# Patient Record
Sex: FEMALE | Race: WHITE | NOT HISPANIC OR LATINO | Employment: UNEMPLOYED | URBAN - NONMETROPOLITAN AREA
[De-identification: names, ages, dates, MRNs, and addresses within clinical notes are randomized per-mention and may not be internally consistent; named-entity substitution may affect disease eponyms.]

---

## 2017-02-01 ENCOUNTER — HOSPITAL ENCOUNTER (OUTPATIENT)
Dept: GENERAL RADIOLOGY | Facility: HOSPITAL | Age: 52
Discharge: HOME | End: 2017-02-01
Attending: FAMILY MEDICINE | Admitting: FAMILY MEDICINE

## 2017-02-01 ENCOUNTER — TRANSCRIBE ORDERS (OUTPATIENT)
Dept: ADMINISTRATIVE | Facility: HOSPITAL | Age: 52
End: 2017-02-01

## 2017-02-01 DIAGNOSIS — M54.2 NECK PAIN: Primary | ICD-10-CM

## 2017-02-01 PROCEDURE — 72050 X-RAY EXAM NECK SPINE 4/5VWS: CPT

## 2017-02-20 ENCOUNTER — OFFICE VISIT (OUTPATIENT)
Dept: NEUROSURGERY | Facility: CLINIC | Age: 52
End: 2017-02-20

## 2017-02-20 VITALS
BODY MASS INDEX: 21.49 KG/M2 | WEIGHT: 129 LBS | DIASTOLIC BLOOD PRESSURE: 60 MMHG | HEIGHT: 65 IN | SYSTOLIC BLOOD PRESSURE: 100 MMHG

## 2017-02-20 DIAGNOSIS — M54.2 NECK PAIN: Primary | ICD-10-CM

## 2017-02-20 PROCEDURE — 99203 OFFICE O/P NEW LOW 30 MIN: CPT | Performed by: NEUROLOGICAL SURGERY

## 2017-02-20 RX ORDER — MELOXICAM 15 MG/1
15 TABLET ORAL DAILY
COMMUNITY
End: 2018-01-18

## 2017-02-20 RX ORDER — MEDROXYPROGESTERONE ACETATE 2.5 MG/1
2.5 TABLET ORAL DAILY
COMMUNITY
End: 2017-08-01 | Stop reason: SDUPTHER

## 2017-02-20 RX ORDER — MAGNESIUM OXIDE 400 MG/1
250 TABLET ORAL DAILY
COMMUNITY
End: 2017-08-01

## 2017-02-20 RX ORDER — PHENOL 1.4 %
600 AEROSOL, SPRAY (ML) MUCOUS MEMBRANE DAILY
COMMUNITY
End: 2017-08-01

## 2017-02-20 RX ORDER — ASPIRIN 81 MG/1
81 TABLET ORAL DAILY
COMMUNITY
End: 2019-09-03

## 2017-02-20 RX ORDER — ESTRADIOL 2 MG/1
2 TABLET ORAL DAILY
COMMUNITY
End: 2017-08-01 | Stop reason: SDUPTHER

## 2017-02-20 RX ORDER — VITAMIN B COMPLEX
1 CAPSULE ORAL DAILY
COMMUNITY
End: 2017-08-01
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
## 2017-02-22 ENCOUNTER — APPOINTMENT (OUTPATIENT)
Dept: PHYSICAL THERAPY | Facility: HOSPITAL | Age: 52
End: 2017-02-22

## 2017-02-24 ENCOUNTER — APPOINTMENT (OUTPATIENT)
Dept: PHYSICAL THERAPY | Facility: HOSPITAL | Age: 52
End: 2017-02-24

## 2017-02-28 ENCOUNTER — APPOINTMENT (OUTPATIENT)
Dept: PHYSICAL THERAPY | Facility: HOSPITAL | Age: 52
End: 2017-02-28

## 2017-03-02 ENCOUNTER — APPOINTMENT (OUTPATIENT)
Dept: PHYSICAL THERAPY | Facility: HOSPITAL | Age: 52
End: 2017-03-02

## 2017-03-06 ENCOUNTER — APPOINTMENT (OUTPATIENT)
Dept: PHYSICAL THERAPY | Facility: HOSPITAL | Age: 52
End: 2017-03-06

## 2017-03-08 ENCOUNTER — APPOINTMENT (OUTPATIENT)
Dept: PHYSICAL THERAPY | Facility: HOSPITAL | Age: 52
End: 2017-03-08

## 2017-03-09 ENCOUNTER — OFFICE VISIT (OUTPATIENT)
Dept: NEUROSURGERY | Facility: CLINIC | Age: 52
End: 2017-03-09

## 2017-03-09 ENCOUNTER — HOSPITAL ENCOUNTER (OUTPATIENT)
Dept: MRI IMAGING | Facility: HOSPITAL | Age: 52
Discharge: HOME | End: 2017-03-09
Attending: NEUROLOGICAL SURGERY | Admitting: NEUROLOGICAL SURGERY

## 2017-03-09 VITALS
BODY MASS INDEX: 21.49 KG/M2 | WEIGHT: 129 LBS | HEIGHT: 65 IN | DIASTOLIC BLOOD PRESSURE: 60 MMHG | SYSTOLIC BLOOD PRESSURE: 100 MMHG

## 2017-03-09 DIAGNOSIS — M54.2 NECK PAIN: Primary | ICD-10-CM

## 2017-03-09 DIAGNOSIS — M54.2 NECK PAIN: ICD-10-CM

## 2017-03-09 PROCEDURE — 72141 MRI NECK SPINE W/O DYE: CPT

## 2017-03-09 PROCEDURE — 99213 OFFICE O/P EST LOW 20 MIN: CPT | Performed by: NEUROLOGICAL SURGERY
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
## 2017-03-13 ENCOUNTER — APPOINTMENT (OUTPATIENT)
Dept: PHYSICAL THERAPY | Facility: HOSPITAL | Age: 52
End: 2017-03-13

## 2017-03-15 ENCOUNTER — APPOINTMENT (OUTPATIENT)
Dept: PHYSICAL THERAPY | Facility: HOSPITAL | Age: 52
End: 2017-03-15

## 2017-03-20 ENCOUNTER — APPOINTMENT (OUTPATIENT)
Dept: PHYSICAL THERAPY | Facility: HOSPITAL | Age: 52
End: 2017-03-20

## 2017-03-22 ENCOUNTER — APPOINTMENT (OUTPATIENT)
Dept: PHYSICAL THERAPY | Facility: HOSPITAL | Age: 52
End: 2017-03-22

## 2017-03-27 ENCOUNTER — APPOINTMENT (OUTPATIENT)
Dept: PHYSICAL THERAPY | Facility: HOSPITAL | Age: 52
End: 2017-03-27

## 2017-03-29 ENCOUNTER — APPOINTMENT (OUTPATIENT)
Dept: PHYSICAL THERAPY | Facility: HOSPITAL | Age: 52
End: 2017-03-29

## 2017-05-22 ENCOUNTER — HOSPITAL ENCOUNTER (OUTPATIENT)
Dept: GENERAL RADIOLOGY | Age: 52
Discharge: HOME | End: 2017-05-22
Payer: COMMERCIAL

## 2017-05-22 DIAGNOSIS — M54.2 CERVICALGIA: ICD-10-CM

## 2017-05-22 PROCEDURE — 72040 X-RAY EXAM NECK SPINE 2-3 VW: CPT

## 2017-08-01 ENCOUNTER — OFFICE VISIT (OUTPATIENT)
Dept: OBSTETRICS AND GYNECOLOGY | Facility: CLINIC | Age: 52
End: 2017-08-01

## 2017-08-01 VITALS
SYSTOLIC BLOOD PRESSURE: 114 MMHG | DIASTOLIC BLOOD PRESSURE: 64 MMHG | BODY MASS INDEX: 20.83 KG/M2 | HEIGHT: 65 IN | WEIGHT: 125 LBS

## 2017-08-01 DIAGNOSIS — Z78.0 MENOPAUSE: ICD-10-CM

## 2017-08-01 DIAGNOSIS — Z01.411 ENCOUNTER FOR GYNECOLOGICAL EXAMINATION WITH ABNORMAL FINDING: ICD-10-CM

## 2017-08-01 DIAGNOSIS — Z87.891 FORMER SMOKER: ICD-10-CM

## 2017-08-01 PROCEDURE — 99386 PREV VISIT NEW AGE 40-64: CPT | Performed by: OBSTETRICS & GYNECOLOGY

## 2017-08-01 RX ORDER — ESTRADIOL 2 MG/1
2 TABLET ORAL DAILY
Qty: 30 TABLET | Refills: 11 | Status: SHIPPED | OUTPATIENT
Start: 2017-08-01 | End: 2018-08-20 | Stop reason: SDUPTHER

## 2017-08-01 RX ORDER — MEDROXYPROGESTERONE ACETATE 2.5 MG/1
2.5 TABLET ORAL DAILY
Qty: 30 TABLET | Refills: 11 | Status: SHIPPED | OUTPATIENT
Start: 2017-08-01 | End: 2018-08-20 | Stop reason: SDUPTHER

## 2017-08-01 NOTE — PROGRESS NOTES
Subjective   Chief Complaint   Patient presents with   • Gynecologic Exam     New pt. Due for yearly exam. Wants to establish care. S/p BIB [facility] for uterine fibroids in  by Dr Acosta. Uses provera 2.5mg and estradiol 2mg, testosterone 1% cream. Pt states she sleeps better when she takes her provera.      Jyoti Pleitez is a 52 y.o. year old  menopausal female presenting to be seen for her annual exam.  The patient is status-post hysterectomy. Hot flashes and night sweats are not a significant problem since she is taking HRT.  Overall, the patient reports feeling well.    SEXUAL Hx:  She is sexually active.  In the past year there has not been new sexual partners.      HEALTH Hx:  She exercises regularly: no.    The patient reports regular self breast exams: no  She has noticed changes in height: no.    No Additional Complaints Reported    The following portions of the patient's history were reviewed and updated as appropriate:problem list, current medications, allergies, past family history, past medical history, past social history and past surgical history.    Smoking status: Former Smoker                                                              Packs/day: 0.00      Years: 14.00        Types: Cigarettes  Smokeless status: Never Used                        Review of Systems   Constitutional: Negative for activity change and unexpected weight change.   HENT: Negative for congestion.    Eyes: Positive for visual disturbance (wears glasses, she also occasionally experiences blurryness in peripheral vision).   Respiratory: Negative for shortness of breath.    Cardiovascular: Negative for chest pain (occasionally with panic attack, but that is rare).   Gastrointestinal: Positive for diarrhea (only with anxiety). Negative for abdominal pain, blood in stool, constipation, nausea and vomiting.   Endocrine: Negative for cold intolerance and heat intolerance.   Genitourinary: Positive for difficulty urinating  "(only early in the morning), enuresis (occasionally, associated with urgerncy) and urgency. Negative for dyspareunia, pelvic pain, vaginal bleeding and vaginal discharge.   Musculoskeletal: Positive for back pain, neck pain and neck stiffness. Negative for arthralgias.   Skin: Negative for rash.   Neurological: Negative for dizziness and headaches.   Psychiatric/Behavioral: Negative for sleep disturbance. The patient is nervous/anxious.          Objective   /64 (BP Location: Left arm, Patient Position: Sitting)  Ht 65\" (165.1 cm)  Wt 125 lb (56.7 kg)  BMI 20.8 kg/m2  Physical Exam   Constitutional: She is oriented to person, place, and time. She appears well-developed and well-nourished. No distress.   HENT:   Head: Normocephalic and atraumatic.   Eyes: EOM are normal.   Neck: Normal range of motion. Neck supple. No thyromegaly present.   Cardiovascular: Normal rate and regular rhythm.    No murmur heard.  Pulmonary/Chest: Effort normal and breath sounds normal. Right breast exhibits no inverted nipple and no mass. Left breast exhibits no inverted nipple and no mass.   S/p bilateral breast augmentation   Abdominal: Soft. She exhibits no distension. There is no tenderness.   Genitourinary: Vagina normal. No breast tenderness or discharge. Pelvic exam was performed with patient supine. There is no tenderness or lesion on the right labia. There is no tenderness or lesion on the left labia. Right adnexum displays no tenderness and no fullness. Left adnexum displays no tenderness and no fullness. No bleeding in the vagina. No vaginal discharge found.   Genitourinary Comments: Pt s/p hysterectomy: uterus and cervix surgically absent.  Vaginal cuff unremarkable.   Musculoskeletal: Normal range of motion. She exhibits no edema.   Neurological: She is alert and oriented to person, place, and time.   Skin: Skin is warm and dry.   Psychiatric: She has a normal mood and affect. Her behavior is normal. Judgment normal. "   Nursing note and vitals reviewed.         Assessment & Plan  Jyoti was seen today for gynecologic exam.    Diagnoses and all orders for this visit:    Body mass index (BMI) 20.0-20.9, adult    Encounter for gynecological examination with abnormal finding: Exam unremarkable.  Labs with PCP.  -     Mammo screening digital tomosynthesis bilateral w CAD  -     DEXA Assess Fracture Vertebral; Future    Former smoker  Comments:  quit in 2001    Menopause  Comments:  HRT refilled.  Script given for compounded testosterone cream.  RTO in 90 days.  Orders:  -     estradiol (ESTRACE) 2 MG tablet; Take 1 tablet by mouth Daily.  -     medroxyPROGESTERone (PROVERA) 2.5 MG tablet; Take 1 tablet by mouth Daily.        This note was electronically signed.    Rossy Georges MD  8/1/2017  9:46 AM

## 2017-08-09 ENCOUNTER — TRANSCRIBE ORDERS (OUTPATIENT)
Dept: ADMINISTRATIVE | Facility: HOSPITAL | Age: 52
End: 2017-08-09

## 2017-08-09 DIAGNOSIS — Z13.820 SCREENING FOR OSTEOPOROSIS: Primary | ICD-10-CM

## 2017-08-09 DIAGNOSIS — Z01.411 ENCOUNTER FOR GYNECOLOGICAL EXAMINATION WITH ABNORMAL FINDING: Primary | ICD-10-CM

## 2017-08-14 ENCOUNTER — APPOINTMENT (OUTPATIENT)
Dept: BONE DENSITY | Facility: HOSPITAL | Age: 52
End: 2017-08-14
Attending: OBSTETRICS & GYNECOLOGY

## 2017-08-14 ENCOUNTER — APPOINTMENT (OUTPATIENT)
Dept: MAMMOGRAPHY | Facility: HOSPITAL | Age: 52
End: 2017-08-14
Attending: OBSTETRICS & GYNECOLOGY

## 2017-08-15 DIAGNOSIS — Z13.820 OSTEOPOROSIS SCREENING: Primary | ICD-10-CM

## 2017-08-22 ENCOUNTER — HOSPITAL ENCOUNTER (OUTPATIENT)
Dept: BONE DENSITY | Facility: HOSPITAL | Age: 52
Discharge: HOME | End: 2017-08-22
Attending: OBSTETRICS & GYNECOLOGY

## 2017-08-22 ENCOUNTER — TELEPHONE (OUTPATIENT)
Dept: OBSTETRICS AND GYNECOLOGY | Facility: CLINIC | Age: 52
End: 2017-08-22

## 2017-08-22 ENCOUNTER — HOSPITAL ENCOUNTER (OUTPATIENT)
Dept: MAMMOGRAPHY | Facility: HOSPITAL | Age: 52
Discharge: HOME | End: 2017-08-22
Attending: OBSTETRICS & GYNECOLOGY | Admitting: OBSTETRICS & GYNECOLOGY

## 2017-08-22 PROCEDURE — G0202 SCR MAMMO BI INCL CAD: HCPCS

## 2017-08-22 PROCEDURE — 77080 DXA BONE DENSITY AXIAL: CPT

## 2017-08-22 PROCEDURE — 77063 BREAST TOMOSYNTHESIS BI: CPT

## 2017-08-22 NOTE — TELEPHONE ENCOUNTER
----- Message from Rossy Georges MD sent at 8/22/2017  1:01 PM CDT -----  Please contact the patient and inform her that her bone density shows osteopenia.  Determine whether or not she would be interested in started medication to prevent progression to osteoporosis, please

## 2017-09-08 ENCOUNTER — OFFICE VISIT (OUTPATIENT)
Dept: OBSTETRICS AND GYNECOLOGY | Facility: CLINIC | Age: 52
End: 2017-09-08

## 2017-09-08 VITALS
BODY MASS INDEX: 20.99 KG/M2 | WEIGHT: 126 LBS | DIASTOLIC BLOOD PRESSURE: 70 MMHG | SYSTOLIC BLOOD PRESSURE: 114 MMHG | HEIGHT: 65 IN

## 2017-09-08 DIAGNOSIS — Z87.891 FORMER SMOKER: ICD-10-CM

## 2017-09-08 DIAGNOSIS — M85.80 OSTEOPENIA: Primary | ICD-10-CM

## 2017-09-08 DIAGNOSIS — Z78.0 MENOPAUSE: ICD-10-CM

## 2017-09-08 PROCEDURE — 99213 OFFICE O/P EST LOW 20 MIN: CPT | Performed by: OBSTETRICS & GYNECOLOGY

## 2017-09-24 NOTE — PROGRESS NOTES
Subjective   Chief Complaint   Patient presents with   • osteopenia     According to pt bone scan pt was to come in to speak with physician regarding this. pt also c/o leg cramping and has been taking a supplement for this. pt says that she can tell a difference. pt voices no other concerns.       Jyoti Pleitez is a 52 y.o. year old .  No LMP recorded. Patient has had a hysterectomy.  She presents to be seen because of recent bone density screening and wants to discuss the results.     The following portions of the patient's history were reviewed and updated as appropriate:current medications and allergies    Smoking status: Former Smoker                                                              Packs/day: 0.00      Years: 14.00        Types: Cigarettes  Smokeless status: Never Used                        Review of Systems   Constitutional: Negative for activity change and unexpected weight change.   HENT: Negative for congestion.    Eyes: Positive for visual disturbance (wears glasses, she also occasionally experiences blurryness in peripheral vision).   Respiratory: Negative for shortness of breath.    Cardiovascular: Negative for chest pain (occasionally with panic attack, but that is rare).   Gastrointestinal: Positive for diarrhea (only with anxiety). Negative for abdominal pain, blood in stool, constipation, nausea and vomiting.   Endocrine: Negative for cold intolerance and heat intolerance.   Genitourinary: Positive for difficulty urinating (only early in the morning), enuresis (occasionally, associated with urgerncy) and urgency. Negative for dyspareunia, pelvic pain, vaginal bleeding and vaginal discharge.   Musculoskeletal: Positive for back pain, neck pain and neck stiffness. Negative for arthralgias.   Skin: Negative for rash.   Neurological: Negative for dizziness and headaches.   Psychiatric/Behavioral: Negative for sleep disturbance. The patient is nervous/anxious.          Objective  "  /70  Ht 65\" (165.1 cm)  Wt 126 lb (57.2 kg)  BMI 20.97 kg/m2    Physical Exam   Constitutional: She is oriented to person, place, and time. She appears well-developed and well-nourished. No distress.   HENT:   Head: Normocephalic and atraumatic.   Eyes: EOM are normal.   Neck: Normal range of motion. No thyromegaly present.   Cardiovascular: Normal rate.    No murmur heard.  Pulmonary/Chest: Effort normal. She has no wheezes.   Abdominal: Soft. She exhibits no distension. There is no tenderness.   Musculoskeletal: Normal range of motion.   Neurological: She is alert and oriented to person, place, and time.   Skin: Skin is warm and dry.   Psychiatric: She has a normal mood and affect. Her behavior is normal. Judgment normal.   Nursing note and vitals reviewed.      Lab Review   No data reviewed    Imaging   DEXA     Assessment & Plan  Jyoti was seen today for osteopenia.    Diagnoses and all orders for this visit:    Osteopenia: based on DEXA 8/2017.  Bone density worse at femoral neck than in vertebrae.  Patient already on HRT for menopause symptoms.  She was previously very good at exercising several days/week.  She prefers to be diligent about CA++ with vit D and adding weight-bearing exercise back into her routine.  Will plan to repeat study in two years and start drug therapy at that time if further bone loss.    Former smoker    Menopause    This note was electronically signed.    Rossy Georges MD  September 24, 2017  6:43 PM    Total time spent today with Jyoti  was 20 minutes (level 3).  Greater than 50% of the time was spent coordinating care, answering her questions and counseling regarding bone health and pathophysiology of her presenting problem along with plans for any diagnositc work-up and treatment.      "

## 2017-10-13 ENCOUNTER — TELEPHONE (OUTPATIENT)
Dept: OBSTETRICS AND GYNECOLOGY | Facility: CLINIC | Age: 52
End: 2017-10-13

## 2017-11-01 ENCOUNTER — OFFICE VISIT (OUTPATIENT)
Dept: OBSTETRICS AND GYNECOLOGY | Facility: CLINIC | Age: 52
End: 2017-11-01

## 2017-11-01 VITALS
SYSTOLIC BLOOD PRESSURE: 98 MMHG | HEIGHT: 65 IN | WEIGHT: 127 LBS | BODY MASS INDEX: 21.16 KG/M2 | DIASTOLIC BLOOD PRESSURE: 68 MMHG

## 2017-11-01 DIAGNOSIS — Z78.0 MENOPAUSE: Primary | ICD-10-CM

## 2017-11-01 PROCEDURE — 99213 OFFICE O/P EST LOW 20 MIN: CPT | Performed by: OBSTETRICS & GYNECOLOGY

## 2017-11-01 NOTE — PROGRESS NOTES
"Subjective   Chief Complaint   Patient presents with   • Med Refill     pt here today for refill on her testosterone cream. pt voices no concerns.      Jyoti Pleitez is a 52 y.o. year old .  No LMP recorded. Patient has had a hysterectomy.  She presents to be seen because of menopause symptoms that are being treated with estrace, provera and compounded testosterone cream.  She can tell a difference in both energy level and sex drive with the testosterone cream, but is hoping that there might be a little higher dose.    The following portions of the patient's history were reviewed and updated as appropriate:current medications and allergies    Smoking status: Former Smoker                                                              Packs/day: 0.00      Years: 14.00        Types: Cigarettes  Smokeless status: Never Used                        Review of Systems   Respiratory: Positive for shortness of breath (only occasionally with anxiety).    Cardiovascular: Negative for chest pain.   Gastrointestinal: Negative for abdominal pain, constipation and diarrhea.   Genitourinary: Negative for dyspareunia, pelvic pain, vaginal bleeding and vaginal discharge.         Objective   BP 98/68  Ht 65\" (165.1 cm)  Wt 127 lb (57.6 kg)  BMI 21.13 kg/m2    Physical Exam   Constitutional: She is oriented to person, place, and time. She appears well-developed and well-nourished. No distress.   HENT:   Head: Normocephalic and atraumatic.   Eyes: EOM are normal.   Neck: Normal range of motion. No thyromegaly present.   Cardiovascular:   No murmur heard.  Pulmonary/Chest: Effort normal. She has no wheezes.   Abdominal: Soft. She exhibits no distension. There is no tenderness.   Musculoskeletal: Normal range of motion.   Neurological: She is alert and oriented to person, place, and time.   Skin: Skin is warm and dry.   Psychiatric: She has a normal mood and affect. Her behavior is normal. Judgment normal.   Nursing note and " vitals reviewed.      Lab Review   No data reviewed    Imaging   No data reviewed     Assessment & Plan    Jyoti was seen today for med refill.    Diagnoses and all orders for this visit:    Menopause: Patient to have testosterone drawn so the dose in her compounded cream can be adjusted.  Will also continue Estrace and Provera.  -     Testosterone  -     Lipid Panel With LDL / HDL Ratio      This note was electronically signed.    Rossy Georges MD  November 1, 2017  9:59 AM    Total time spent today with Jyoti  was 20 minutes (level 3).  Greater than 50% of the time was spent coordinating care, answering her questions and counseling regarding pathophysiology of her presenting problem along with plans for any diagnositc work-up and treatment.

## 2017-11-02 ENCOUNTER — TELEPHONE (OUTPATIENT)
Dept: OBSTETRICS AND GYNECOLOGY | Facility: CLINIC | Age: 52
End: 2017-11-02

## 2017-11-02 LAB
CHOLEST SERPL-MCNC: 196 MG/DL (ref 130–200)
HDLC SERPL-MCNC: 91 MG/DL
LDLC SERPL CALC-MCNC: 84 MG/DL (ref 0–99)
LDLC/HDLC SERPL: 0.92 {RATIO}
TESTOST SERPL-MCNC: 12 NG/DL (ref 3–41)
TRIGL SERPL-MCNC: 107 MG/DL (ref 0–149)
VLDLC SERPL CALC-MCNC: 21.4 MG/DL

## 2017-11-02 NOTE — TELEPHONE ENCOUNTER
"Called patient and discussed testosterone levels. Patient is advised that she can either double the volume that she is applying daily or we can call into the pharmacy and have them double the concentration of Testosterone (patient currently using 1%) Patient would like to try doubling volume of medication first and see how she does with that. Patient states that she has tried the 2% testosterone before and it made her \"crazy\" Told patient to try for roughly 2 weeks and call back to let us know how she's doing. Patient voices understanding.         "

## 2017-11-27 NOTE — TELEPHONE ENCOUNTER
Pt called today to let us know she has been doing double the testosterone and said it works and no side effects. So I called the script into [facility] for double the testosterone.

## 2017-12-21 ENCOUNTER — APPOINTMENT (OUTPATIENT)
Dept: GENERAL RADIOLOGY | Facility: HOSPITAL | Age: 52
End: 2017-12-21

## 2017-12-21 PROCEDURE — 72110 X-RAY EXAM L-2 SPINE 4/>VWS: CPT

## 2018-01-10 ENCOUNTER — TRANSCRIBE ORDERS (OUTPATIENT)
Dept: ADMINISTRATIVE | Facility: HOSPITAL | Age: 53
End: 2018-01-10

## 2018-01-10 DIAGNOSIS — M89.49 OTHER HYPERTROPHIC OSTEOARTHROPATHY, MULTIPLE SITES: ICD-10-CM

## 2018-01-10 DIAGNOSIS — M54.89 OTHER BACK PAIN, UNSPECIFIED CHRONICITY: ICD-10-CM

## 2018-01-10 DIAGNOSIS — M54.5 LOW BACK PAIN, UNSPECIFIED BACK PAIN LATERALITY, UNSPECIFIED CHRONICITY, WITH SCIATICA PRESENCE UNSPECIFIED: ICD-10-CM

## 2018-01-10 DIAGNOSIS — M54.17 RADICULOPATHY, LUMBOSACRAL REGION: Primary | ICD-10-CM

## 2018-01-17 ENCOUNTER — HOSPITAL ENCOUNTER (OUTPATIENT)
Dept: MRI IMAGING | Facility: HOSPITAL | Age: 53
Discharge: HOME | End: 2018-01-17
Admitting: PHYSICIAN ASSISTANT

## 2018-01-17 DIAGNOSIS — M89.49 OTHER HYPERTROPHIC OSTEOARTHROPATHY, MULTIPLE SITES: ICD-10-CM

## 2018-01-17 DIAGNOSIS — M54.5 LOW BACK PAIN, UNSPECIFIED BACK PAIN LATERALITY, UNSPECIFIED CHRONICITY, WITH SCIATICA PRESENCE UNSPECIFIED: ICD-10-CM

## 2018-01-17 DIAGNOSIS — M54.17 RADICULOPATHY, LUMBOSACRAL REGION: ICD-10-CM

## 2018-01-17 DIAGNOSIS — M54.89 OTHER BACK PAIN, UNSPECIFIED CHRONICITY: ICD-10-CM

## 2018-01-17 PROCEDURE — 72148 MRI LUMBAR SPINE W/O DYE: CPT

## 2018-01-24 ENCOUNTER — OFFICE VISIT (OUTPATIENT)
Dept: NEUROSURGERY | Facility: CLINIC | Age: 53
End: 2018-01-24

## 2018-01-24 VITALS
SYSTOLIC BLOOD PRESSURE: 100 MMHG | HEIGHT: 65 IN | DIASTOLIC BLOOD PRESSURE: 60 MMHG | BODY MASS INDEX: 21.66 KG/M2 | WEIGHT: 130 LBS

## 2018-01-24 DIAGNOSIS — Z78.9 CURRENT NON-SMOKER: ICD-10-CM

## 2018-01-24 DIAGNOSIS — M47.816 LUMBAR SPONDYLOSIS: Primary | ICD-10-CM

## 2018-01-24 PROCEDURE — 99213 OFFICE O/P EST LOW 20 MIN: CPT | Performed by: NEUROLOGICAL SURGERY

## 2018-01-24 NOTE — PROGRESS NOTES
"    Chief complaint   Chief Complaint   Patient presents with   • Back Pain        Subjective     HPI:     This patient is a 52-year-old female who has had 2 severe episodes of back pain over the past 1 year.  Her most recent episode started on December 16 and felt initially like a lightening bolt.  It started while showering, and the pain was located in the low back.  Her pain level was a 7-8 out of 10.  Over the past 5 weeks, her pain has improved and is currently 2 out of 10.  She does not endorse leg weakness or numbness.  Physical activity had made her pain worse, but over the past 1 week, her pain has improved.  She has had low back issues over the past 17 years.  She has not had surgery, physical therapy, or pain management.  She currently is being treated with Mobic for treatment of her pain    Review of Systems     A 12 point review of systems is obtained and is negative except for as described in HPI    Past Medical History:   Diagnosis Date   • Back problem      Past Surgical History:   Procedure Laterality Date   • AUGMENTATION MAMMAPLASTY  1997   • BREAST AUGMENTATION     • FOOT SURGERY Bilateral    • HYSTERECTOMY      BIB [facility] for uterine fibroids in 2008 by Dr Acosta   • OOPHORECTOMY     • TONSILLECTOMY       Family History   Problem Relation Age of Onset   • Lung cancer Mother    • Coronary artery disease Father    • Hypertension Father    • Prostate cancer Father    • Coronary artery disease Brother    • Coronary artery disease Maternal Grandfather    • Colon cancer Cousin    • Breast cancer Maternal Aunt      Social History   Substance Use Topics   • Smoking status: Former Smoker     Years: 14.00     Types: Cigarettes   • Smokeless tobacco: Never Used   • Alcohol use 0.6 oz/week     1 Glasses of wine per week       (Not in a hospital admission)  Allergies:  Sulfa antibiotics    Objective      Vital Signs  /60  Ht 165.1 cm (65\")  Wt 59 kg (130 lb)  BMI 21.63 kg/m2    Physical Exam    No acute " distress  Awake alert oriented ×3  HEENT atraumatic, normocephalic  Neck supple  Extremities no clubbing, edema, cyanosis  Neurologic exam  Cranial nerves II through XII grossly intact  No pronator drift  Patient moves all extremities 5 out of 5 strength  Sensation is intact light touch in upper and lower extremities  2+ biceps reflex, 2+ patellar reflexes    Results Review:     Mri Lumbar Spine Without Contrast    Result Date: 1/17/2018  Narrative: MRI LUMBAR SPINE WO CONTRAST- 1/17/2018 11:16 CST  HISTORY: radiculopathy; M54.17-Radiculopathy, lumbosacral region; M54.89-Other dorsalgia; M54.5-Low back pain; M89.49-Other hypertrophic osteoarthropathy, multiple sites  COMPARISON: None  TECHNIQUE: Multiplanar, multisequence MRI of the lumbar spine was performed without the use of contrast.  FINDINGS:  Alignment: There are presumed to be 5 lumbar-type vertebrae with the most inferior being labeled L5. Normal lumbar lordosis is maintained. There is no evidence of listhesis or subluxation.  Marrow signal: No pathologic marrow infiltrate is demonstrated. The vertebral body heights and posterior elements are maintained.  Cord/Canal: The conus medullaris terminates at the level of T12-L1. The visualized spinal cord is normal in signal and morphology.  Soft tissues: The surrounding soft tissues are unremarkable.  Levels:  L1-L2: No disc bulge is present. No significant neuroforaminal or central canal stenosis is seen.  L2-L3: Mild diffuse disc bulging. No significant spinal stenosis. No significant neuroforaminal narrowing.  L3-L4: There is loss of disc height with mild diffuse disc bulging. No significant spinal stenosis. No significant neuroforaminal narrowing.  L4-L5: Disc desiccation with loss of disc height and mild diffuse disc bulging. No significant spinal stenosis. There is mild bilateral facet arthropathy without significant neuroforaminal narrowing.  L5-S1: Disc desiccation with loss of disc height and diffuse  disc bulging. The disc bulging does efface the lateral recess on the right, coming in close proximity to the traversing spinal root. No significant spinal stenosis. Bilateral facet arthropathy (right greater than left) with moderate right-sided neuroforaminal narrowing.      Impression: 1. Overall normal spine alignment. 2. Multilevel disc bulging without significant spinal stenosis. There is effacement of the right lateral recess at L5-S1. Moderate right-sided neuroforaminal narrowing at L5-S1 secondary to facet arthropathy.   This report was finalized on 01/17/2018 12:34 by Dr Brian Georges, .          Assessment/Plan:     52-year-old female with 2 episodes of back pain over the past 1 year, last episode lasted 5 weeks.  Her pain has improved spontaneously over the past week and she currently does not endorse radiculopathy or myelopathy.  We will refer her to physical therapy.  I directly reviewed imaging findings with the patient.  She does have L4, 5 and L5, S1 disc desiccation without foraminal stenosis.  I do not recommend surgery at this time, and I do not anticipate surgery in the future at this time.  We will follow-up with her in the future as needed.  Thank you for this consultation.    I discussed the patients findings and my recommendations with patient    Eric Zabala MD  01/24/18  10:05 AM

## 2018-02-09 ENCOUNTER — HOSPITAL ENCOUNTER (OUTPATIENT)
Dept: PHYSICAL THERAPY | Facility: HOSPITAL | Age: 53
Setting detail: THERAPIES SERIES
Discharge: HOME | End: 2018-02-09
Attending: NEUROLOGICAL SURGERY

## 2018-02-09 DIAGNOSIS — M47.816 LUMBAR SPONDYLOSIS: Primary | ICD-10-CM

## 2018-02-09 PROCEDURE — 97161 PT EVAL LOW COMPLEX 20 MIN: CPT | Performed by: PHYSICAL THERAPIST

## 2018-02-09 PROCEDURE — 97110 THERAPEUTIC EXERCISES: CPT | Performed by: PHYSICAL THERAPIST

## 2018-02-09 NOTE — THERAPY EVALUATION
Outpatient Physical Therapy Ortho Initial Evaluation  [facility]     Patient Name: Jyoti Pleitez  : 1965  MRN: [id number]  Today's Date: 2018      Visit Date: 2018    Patient Active Problem List   Diagnosis   • Neck pain   • Lumbar spondylosis   • Current non-smoker        Past Medical History:   Diagnosis Date   • Back problem         Past Surgical History:   Procedure Laterality Date   • AUGMENTATION MAMMAPLASTY     • BREAST AUGMENTATION     • FOOT SURGERY Bilateral    • HYSTERECTOMY      BIB [facility] for uterine fibroids in  by Dr Acosta   • OOPHORECTOMY     • TONSILLECTOMY         Visit Dx:     ICD-10-CM ICD-9-CM   1. Lumbar spondylosis M47.816 721.3             Patient History       18 0755          History    Chief Complaint Pain  -KR      Type of Pain Back pain  -KR      Date Current Problem(s) Began --   on/off 15 years  -KR      Brief Description of Current Complaint She reports back pain on/off for over 15 years. She reports in the last year it has been worse. She reports flexieril used to help manage when she had flares. She reports two times in the last year that her pain was very bad. She reports unable to complete her normal gym activities. Increased pain with household activities. She takes care of her house and her dad's. She reports increased stiffness as well. She reports being very sore after standing activities and even limited with benind over to tie her shoes. No N/T, she reports only bowel/bladder issue is constipation now. She does have history of complete hysterectomy with abdomen incision. She also reports caring for her mother in law, who she sometimes has to push in a wheelchair or assist with walking.   -KR      Previous treatment for THIS PROBLEM Injections;Massage;Pain Management;Chiropractor;Rehabilitation;Medication  -KR      Patient's Rating of General Health Good  -KR      Hand Dominance right-handed  -KR      Occupation/sports/leisure activities  cleans/keeps up 2 houses  -KR      Patient seeing anyone else for problem(s)? Yes  -KR      How has patient tried to help current problem? medication, heat   -KR      What clinical tests have you had for this problem? X-ray;MRI;Bone Density  -KR      Results of Clinical Tests multilevel disc bulging without significant spinal stenosis. there is effacement of the right lateral recess at L5/S1. Moderate right-sided neuroforaminal narrowing at L5-S1 secondary narrowing at L5-S1 secondary to facet arthropathy.   -KR      Pain     Pain Location Back;Buttocks;Abdomen   B  -KR      Pain at Present 0  -KR      Pain at Best 0  -KR      Pain at Worst 3;4   has been up higher last month  -KR      Pain Frequency Intermittent  -KR      Pain Description Sore  -KR      What Performance Factors Make the Current Problem(s) WORSE? mopping, sweeping, cleaning, bending over to tie shoes  -KR      What Performance Factors Make the Current Problem(s) BETTER? slept in recliner when pain was at it's worst  -KR      Is your sleep disturbed? Yes   now back to sleeping  -KR      Fall Risk Assessment    Any falls in the past year: No  -KR      Services    Prior Rehab/Home Health Experiences Yes  -KR      Where was the prior experience with Rehab/Home Health [facility] and reports a facility in [place]  -KR      Are you currently receiving Home Health services No  -KR      Do you plan to receive Home Health services in the near future No  -KR      Daily Activities    Are you able to read Yes  -KR      Are you able to write Yes  -KR      How does patient learn best? Listening;Reading;Demonstration;Pictures/Video  -KR      Teaching needs identified Home Exercise Program;Management of Condition  -KR      Does patient have problems with the following? Depression  -KR      Pt Participated in POC and Goals Yes  -KR      Safety    Are you being hurt, hit, or frightened by anyone at home or in your life? No  -KR      Are you being neglected by a caregiver  No  -KR        User Key  (r) = Recorded By, (t) = Taken By, (c) = Cosigned By    Initials Name Provider Type    DOMINGA Perez, PT DPT Physical Therapist                PT Ortho       02/09/18 1896    Posture/Observations    Alignment Options Forward head;Cervical lordosis;Thoracic kyphosis;Rounded shoulders;Lumbar lordosis  -KR    Thoracic Kyphosis Mild;Decreased  -KR    Lumbar lordosis Mild;Decreased  -KR    Quarter Clearing    Quarter Clearing Lower Quarter Clearing  -KR    DTR- Lower Quarter Clearing    Patellar tendon (L2-4) Right:;1- Minimal response;Left:;2- Normal response  -KR    Achilles tendon (S1-2) Bilateral:;2- Normal response  -KR    Neural Tension Signs- Lower Quarter Clearing    SLR Bilateral:;Negative  -KR    Pathological Reflexes- Lower Quarter Clearing    Clonus Bilateral:;Negative  -KR    Sensory Screen for Light Touch- Lower Quarter Clearing    L1 (inguinal area) Bilateral:;Intact  -KR    L2 (anterior mid thigh) Bilateral:;Intact  -KR    L3 (distal anterior thigh) Intact;Bilateral:  -KR    L4 (medial lower leg/foot) Bilateral:;Intact  -KR    L5 (lateral lower leg/great toe) Bilateral:;Intact  -KR    S1 (bottom of foot) Bilateral:;Intact  -KR    Myotomal Screen- Lower Quarter Clearing    Hip flexion (L2) Left:;4- (Good -);Right:;4 (Good)  -KR    Knee extension (L3) Left:;WNL;Right:;4 (Good)  -KR    Ankle DF (L4) Bilateral:;WNL  -KR    Great toe extension (L5) Bilateral:;WNL  -KR    Ankle PF (S1) Bilateral:;WNL  -KR    Knee flexion (S2) Left:;WNL;Right:;4 (Good)  -KR    Lumbar ROM Screen- Lower Quarter Clearing    Lumbar Flexion Impaired   25% limited spinal segmental mobiltiy  -KR    Lumbar Extension Impaired   25%  -KR    Special Tests/Palpation    Special Tests/Palpation Lumbar/SI  -KR    Lumbosacral Palpation    SI Bilateral:;Tender  -KR    Lumbosacral Segment Bilateral:;Tender  -KR    Piriformis Bilateral:;Tender;Guarded/taut  -KR    Erector Spinae (Paraspinals)  Bilateral:;Tender;Guarded/taut  -KR    Lumbosacral Palpation? Yes  -KR    Lumbosacral Accessory Motions    Lumbosacral Accessory Motions Tested? --  -KR    Lumbar/SI Special Tests    Trendelenburg Test (Gluteus Medius Weakness) Bilateral:;Positive  -KR    Thigh Thrust/Posterior Shear (SI Dysfunction) Left:;Positive;Right:;Negative  -KR    BIA (hip vs. SI Dysfunction) Bilateral:;Negative  -KR    Special Lumbosacral Questions    Are you pregnant? No  -KR    Have you had any abdominal surgeries? Yes  -KR    MMT (Manual Muscle Testing)    General MMT Assessment Detail Hip abduction 3+/5 B  -KR    Pathomechanics    Spine Pathomechanics Hinges into extension at one segment in lumbar;Bends knees with attempted lumbar extension  -KR    Balance Skills Training    SLS R 14 seconds L 8 seconds  -KR      User Key  (r) = Recorded By, (t) = Taken By, (c) = Cosigned By    Initials Name Provider Type    DOMINGA Perez, PT DPT Physical Therapist                      Therapy Education  Education Details: LTR, SKC, TAs; posture with household chores  Given: HEP, Symptoms/condition management, Posture/body mechanics  Program: New  How Provided: Verbal, Demonstration, Written  Provided to: Patient  Level of Understanding: Teach back education performed, Verbalized, Demonstrated           PT OP Goals       02/09/18 0755       PT Short Term Goals    STG Date to Achieve 03/11/18  -KR     STG 1 Pt will score 30% or less on the modified Oswestry.   -KR     STG 1 Progress New  -KR     STG 2 Pt will report completing household chores with pain no greater than 3-4/10.   -KR     STG 2 Progress New  -KR     STG 3 Pt will be able to bend over to tie shoes with pain no greater than 1-2/10.   -KR     STG 3 Progress New  -KR     Long Term Goals    LTG Date to Achieve 04/10/18  -KR     LTG 1 Pt will score 10% or less on the modified Oswestry.   -KR     LTG 1 Progress New  -KR     LTG 2 Pt will demonstrate single leg stance for 30 seconds or  greater without comensation at the hip.   -KR     LTG 2 Progress New  -KR     LTG 3 Pt will demonstrate 4/5 or greater hip abduction.   -KR     LTG 3 Progress New  -KR     LTG 4 Pt will report completing household chores with pain no greater than 1-2-/10.   -KR     LTG 4 Progress New  -KR     LTG 5 Pt will report returning to gym routine with pain no greater than 1-2/10.   -KR     LTG 5 Progress New  -KR     Time Calculation    PT Goal Re-Cert Due Date 03/11/18  -KR       User Key  (r) = Recorded By, (t) = Taken By, (c) = Cosigned By    Initials Name Provider Type    DOMINGA Perez, PT DPT Physical Therapist                PT Assessment/Plan       02/09/18 4905       PT Assessment    Functional Limitations Limitation in home management;Limitations in community activities;Performance in leisure activities;Performance in self-care ADL  -KR     Impairments Balance;Gait;Pain;Muscle strength;Posture;Range of motion  -KR     Assessment Comments Jyoti presents with long history of lower back and buttock pain. Upon assessment she has limited segmental spinal mobiltiy as demonstrated with AROM in standing. She does not tolerate prone, so we will assess spinal mobility more in the massage chair within the next few visits. She does have decreased hip and core strength limiting her balance and increasing strain on lumar and SI joints with household chores. She is very motivated and should progress well. She is wanting to return to her gym routine as well as decrease pain with household chores.   -KR     Please refer to paper survey for additional self-reported information Yes  -KR     Rehab Potential Good  -KR     Patient/caregiver participated in establishment of treatment plan and goals Yes  -KR     Patient would benefit from skilled therapy intervention Yes  -KR     PT Plan    PT Frequency 1x/week;2x/week  -KR     Predicted Duration of Therapy Intervention (days/wks) 4-8 weeks  -KR     Planned CPT's? PT EVAL LOW  COMPLEXITY: 95317;PT THER PROC EA 15 MIN: 80622;PT THER ACT EA 15 MIN: 70188;PT MANUAL THERAPY EA 15 MIN: 82359;PT NEUROMUSC RE-EDUCATION EA 15 MIN: 51640;PT GAIT TRAINING EA 15 MIN: 69352;PT HOT OR COLD PACK TREAT MCARE;PT ELECTRICAL STIM UNATTEND: ;PT ELECTRICAL STIM ATTD EA 15 MIN: 53762  -KR     Physical Therapy Interventions (Optional Details) transfer training;taping;swiss ball techniques;stretching;strengthening;stair training;ROM (Range of Motion);postural re-education;patient/family education;neuromuscular re-education;modalities;manual therapy techniques;lumbar stabilization;joint mobilization;home exercise program;gait training  -KR     PT Plan Comments We will start by working on spinal and soft tissue mobiltiy in massage chair, since she is limited with prone. We will then work on hip/core strength and progressing towards gym activities. We will also discuss posture with household chores. I will also assess abdominal scar tissue from prior surgery.   -KR       User Key  (r) = Recorded By, (t) = Taken By, (c) = Cosigned By    Initials Name Provider Type    DOMINGA Perez, PT DPT Physical Therapist                  Exercises       02/09/18 0800          Exercise 1    Exercise Name 1 LTR  -KR      Cueing 1 Verbal  -KR      Sets 1 1  -KR      Reps 1 10  -KR      Exercise 2    Exercise Name 2 SKC  -KR      Cueing 2 Verbal  -KR      Sets 2 2  -KR      Time (Seconds) 2 30  -KR      Exercise 3    Exercise Name 3 TA  -KR      Cueing 3 Verbal;Tactile  -KR      Sets 3 1  -KR      Reps 3 10  -KR        User Key  (r) = Recorded By, (t) = Taken By, (c) = Cosigned By    Initials Name Provider Type    DOMINGA Perez, PT DPT Physical Therapist                        Outcome Measure Options: Modifed Owestry  Modified Oswestry  Modified Oswestry Score/Comments: 52%      Time Calculation:   Start Time: 0755  Stop Time: 0855  Time Calculation (min): 60 min  Total Timed Code Minutes- PT: 10 minute(s)      Therapy Charges for Today     Code Description Service Date Service Provider Modifiers Qty    07379348485 HC PT THER PROC EA 15 MIN 2/9/2018 Omaira Perez, PT DPT GP 1    19365086575 HC PT EVAL LOW COMPLEXITY 3 2/9/2018 Omaira Perez, PT DPT GP 1          PT G-Codes  Outcome Measure Options: Modifed Tk Perez, PT DPT  2/9/2018

## 2018-02-13 ENCOUNTER — HOSPITAL ENCOUNTER (OUTPATIENT)
Dept: PHYSICAL THERAPY | Facility: HOSPITAL | Age: 53
Setting detail: THERAPIES SERIES
Discharge: HOME | End: 2018-02-13
Attending: NEUROLOGICAL SURGERY

## 2018-02-13 DIAGNOSIS — M47.816 LUMBAR SPONDYLOSIS: Primary | ICD-10-CM

## 2018-02-13 PROCEDURE — 97110 THERAPEUTIC EXERCISES: CPT | Performed by: PHYSICAL THERAPIST

## 2018-02-13 PROCEDURE — 97140 MANUAL THERAPY 1/> REGIONS: CPT | Performed by: PHYSICAL THERAPIST

## 2018-02-13 NOTE — THERAPY TREATMENT NOTE
Outpatient Physical Therapy Ortho Treatment Note  [facility]     Patient Name: Jyoti Pleitez  : 1965  MRN: [id number]  Today's Date: 2018      Visit Date: 2018    Visit Dx:    ICD-10-CM ICD-9-CM   1. Lumbar spondylosis M47.816 721.3       Patient Active Problem List   Diagnosis   • Neck pain   • Lumbar spondylosis   • Current non-smoker        Past Medical History:   Diagnosis Date   • Back problem         Past Surgical History:   Procedure Laterality Date   • AUGMENTATION MAMMAPLASTY     • BREAST AUGMENTATION     • FOOT SURGERY Bilateral    • HYSTERECTOMY      BIB [facility] for uterine fibroids in  by Dr Acosta   • OOPHORECTOMY     • TONSILLECTOMY                               PT Assessment/Plan       18 0745       PT Assessment    Assessment Comments No goals met at this point, this is her first visit since initial evaluation. She only reported TL junction stiffness after treatment today. She does have hypomobility in her thorcic spine along with muscle guarding R>L in her lumbar paraspinals. She progress well with core activities and we will continue to focus on this.   -KR     PT Plan    PT Plan Comments In the next few visits her abdominal scar tissue needs to be assessed. Conitnue to progress with spinal and soft tissue mobiltiy. Progress with core strengthening. Discuss posture/mechanics with house hold activities.   -KR       User Key  (r) = Recorded By, (t) = Taken By, (c) = Cosigned By    Initials Name Provider Type    DOMINGA Perez, PT DPT Physical Therapist                    Exercises       18 5150          Subjective Comments    Subjective Comments She reports soreness Friday, but ok the rest of weekend. She reports doing her exercises as well.   -KR      Subjective Pain    Able to rate subjective pain? yes  -KR      Pre-Treatment Pain Level 0  -KR      Post-Treatment Pain Level 0   tightness in TL junction area.   -KR      Exercise 1    Exercise Name 1  "LTR  -KR      Cueing 1 Verbal  -KR      Sets 1 1  -KR      Reps 1 10  -KR      Exercise 2    Exercise Name 2 SKC  -KR      Cueing 2 Verbal  -KR      Sets 2 1  -KR      Time (Seconds) 2 30  -KR      Exercise 3    Exercise Name 3 TA   -KR      Cueing 3 Verbal  -KR      Sets 3 1  -KR      Reps 3 10  -KR      Exercise 4    Exercise Name 4 LTR on SB   -KR      Cueing 4 Verbal  -KR      Sets 4 2  -KR      Reps 4 10   each side  -KR      Exercise 5    Exercise Name 5 Marches on SB   -KR      Cueing 5 Verbal  -KR      Sets 5 2  -KR      Reps 5 10  -KR      Exercise 6    Exercise Name 6 hooklying on 1/2 stretch  -KR      Cueing 6 Verbal  -KR      Time (Minutes) 6 2  -KR      Exercise 7    Exercise Name 7 hooklying on 1/2 with PPT/TA  -KR      Cueing 7 Verbal  -KR      Sets 7 3  -KR      Reps 7 15  -KR        User Key  (r) = Recorded By, (t) = Taken By, (c) = Cosigned By    Initials Name Provider Type    DOMINGA Perez, PT DPT Physical Therapist                        Manual Rx (last 36 hours)      Manual Treatments       02/13/18 0745          Manual Rx 1    Manual Rx 1 Location in massage chair STM to thorcolumbar bess  -KR      Manual Rx 1 Grade min-mod Op  -KR      Manual Rx 1 Duration 8  -KR      Manual Rx 2    Manual Rx 2 Location in massage chair thoracic   -KR      Manual Rx 2 Grade 1/2  -KR      Manual Rx 2 Duration 5  -KR        User Key  (r) = Recorded By, (t) = Taken By, (c) = Cosigned By    Initials Name Provider Type    DOMINGA Perez, PT DPT Physical Therapist                PT OP Goals       02/13/18 0745       PT Short Term Goals    STG Date to Achieve 03/11/18  -KR     STG 1 Pt will score 30% or less on the modified Oswestry.   -KR     STG 1 Progress Ongoing  -KR     STG 2 Pt will report completing household chores with pain no greater than 3-4/10.   -KR     STG 2 Progress Ongoing  -KR     STG 2 Progress Comments 5/10 \"stiffness\" after initial visit  -KR     STG 3 Pt will be able to bend " over to tie shoes with pain no greater than 1-2/10.   -KR     STG 3 Progress Ongoing  -KR     Long Term Goals    LTG Date to Achieve 04/10/18  -KR     LTG 1 Pt will score 10% or less on the modified Oswestry.   -KR     LTG 1 Progress Ongoing  -KR     LTG 2 Pt will demonstrate single leg stance for 30 seconds or greater without comensation at the hip.   -KR     LTG 2 Progress Ongoing  -KR     LTG 3 Pt will demonstrate 4/5 or greater hip abduction.   -KR     LTG 3 Progress Ongoing  -KR     LTG 4 Pt will report completing household chores with pain no greater than 1-2-/10.   -KR     LTG 4 Progress Ongoing  -KR     LTG 5 Pt will report returning to gym routine with pain no greater than 1-2/10.   -KR     LTG 5 Progress Ongoing  -KR     Time Calculation    PT Goal Re-Cert Due Date 03/11/18  -KR       User Key  (r) = Recorded By, (t) = Taken By, (c) = Cosigned By    Initials Name Provider Type    DOMINGA Perez, PT DPT Physical Therapist          Therapy Education  Education Details: JEFFERSON morel; PPT  Given: HEP, Symptoms/condition management  Program: Progressed  How Provided: Verbal, Demonstration, Written  Provided to: Patient  Level of Understanding: Verbalized, Demonstrated, Teach back education performed              Time Calculation:   Start Time: 0745  Stop Time: 0831  Time Calculation (min): 46 min  Total Timed Code Minutes- PT: 46 minute(s)    Therapy Charges for Today     Code Description Service Date Service Provider Modifiers Qty    73233179391 HC PT MANUAL THERAPY EA 15 MIN 2/13/2018 Omaira Perez, PT DPT GP 1    99157274906 HC PT THER PROC EA 15 MIN 2/13/2018 Omaira Perez PT DPT GP 2                    Omaira Perez, PT DPT  2/13/2018

## 2018-02-21 ENCOUNTER — HOSPITAL ENCOUNTER (OUTPATIENT)
Dept: PHYSICAL THERAPY | Facility: HOSPITAL | Age: 53
Setting detail: THERAPIES SERIES
Discharge: HOME | End: 2018-02-21
Attending: NEUROLOGICAL SURGERY

## 2018-02-21 DIAGNOSIS — M47.816 LUMBAR SPONDYLOSIS: Primary | ICD-10-CM

## 2018-02-21 PROCEDURE — 97140 MANUAL THERAPY 1/> REGIONS: CPT

## 2018-02-21 PROCEDURE — 97110 THERAPEUTIC EXERCISES: CPT

## 2018-02-21 NOTE — THERAPY TREATMENT NOTE
Outpatient Physical Therapy Ortho Treatment Note  [facility]     Patient Name: Jyoti Pleitez  : 1965  MRN: [id number]  Today's Date: 2018      Visit Date: 2018    Visit Dx:    ICD-10-CM ICD-9-CM   1. Lumbar spondylosis M47.816 721.3       Patient Active Problem List   Diagnosis   • Neck pain   • Lumbar spondylosis   • Current non-smoker        Past Medical History:   Diagnosis Date   • Back problem         Past Surgical History:   Procedure Laterality Date   • AUGMENTATION MAMMAPLASTY     • BREAST AUGMENTATION     • FOOT SURGERY Bilateral    • HYSTERECTOMY      BIB BSO for uterine fibroids in  by Dr Acosta   • OOPHORECTOMY     • TONSILLECTOMY                               PT Assessment/Plan       18 0800       PT Assessment    Assessment Comments Pt reports no pain today and only reported some TL stiffness with exercises today. With soft tissue mobilizations today pt very stiff and tight throughout B UT and thoracic paraspinals. Thoracic spine also very stiff and hypomobile. Progressed exercises adding in more core and starting with B hip abduction exercises. Pt had increased difficulty with sidelying clamshells which is to be expected with poor measurements from eval.   -TR     PT Plan    PT Plan Comments Address abdominal scar and try freeup with soft tissue work. Continue with hip and core strengthening.   -TR       User Key  (r) = Recorded By, (t) = Taken By, (c) = Cosigned By    Initials Name Provider Type    BETINA Bhardwaj PTA Physical Therapy Assistant                    Exercises       18 0800          Subjective Comments    Subjective Comments Pt reports that she has only been performing her exercises once a day, no new complaints, no pain. Pt reports that if she has been sitting a long time she is still getting stiff.   -TR      Subjective Pain    Able to rate subjective pain? yes  -TR      Pre-Treatment Pain Level 0  -TR      Post-Treatment Pain Level  0  -TR      Exercise 1    Exercise Name 1 Reviewed HEP for accuracy   -TR      Exercise 2    Exercise Name 2 SKC  -TR      Cueing 2 Verbal  -TR      Sets 2 1   each LE   -TR      Time (Seconds) 2 30  -TR      Exercise 4    Exercise Name 4 LTR on SB   -TR      Cueing 4 Verbal  -TR      Sets 4 2  -TR      Reps 4 10   each side  -TR      Exercise 5    Exercise Name 5 Supine mini bridges  -TR      Cueing 5 Verbal  -TR      Sets 5 2  -TR      Reps 5 10  -TR      Additional Comments Pt reports increased stiffness   -TR      Exercise 6    Exercise Name 6 hooklying on 1/2 stretch  -TR      Cueing 6 Verbal  -TR      Time (Minutes) 6 2  -TR      Exercise 7    Exercise Name 7 hooklying on 1/2 with marches   -TR      Cueing 7 Verbal  -TR      Sets 7 3  -TR      Reps 7 15  -TR      Exercise 8    Exercise Name 8 BLE Hip abduction in hooklying   -TR      Cueing 8 Verbal  -TR      Sets 8 2  -TR      Reps 8 10  -TR      Additional Comments Yellow T-band   -TR      Exercise 9    Exercise Name 9 Sidelying clams B  -TR      Cueing 9 Verbal  -TR      Sets 9 1  -TR      Reps 9 10   Each LE   -TR        User Key  (r) = Recorded By, (t) = Taken By, (c) = Cosigned By    Initials Name Provider Type    TR Emma Bhardwaj PTA Physical Therapy Assistant                        Manual Rx (last 36 hours)      Manual Treatments       02/21/18 0800          Manual Rx 1    Manual Rx 1 Location Thoracic paraspinals and B UT   -TR      Manual Rx 1 Type STM in massage chair   -TR      Manual Rx 1 Grade min-mod Op  -TR      Manual Rx 1 Duration 15  -TR      Manual Rx 2    Manual Rx 2 Location mid-upper thoracic   -TR      Manual Rx 2 Type Extension mobilizations   -TR      Manual Rx 2 Grade 1-2  -TR      Manual Rx 2 Duration 8  -TR        User Key  (r) = Recorded By, (t) = Taken By, (c) = Cosigned By    Initials Name Provider Type    TR Emma Bhardwaj PTA Physical Therapy Assistant                PT OP Goals       02/21/18 0800       PT Short  Term Goals    STG Date to Achieve 03/11/18  -TR     STG 1 Pt will score 30% or less on the modified Oswestry.   -TR     STG 1 Progress Ongoing  -TR     STG 2 Pt will report completing household chores with pain no greater than 3-4/10.   -TR     STG 2 Progress Ongoing  -TR     STG 3 Pt will be able to bend over to tie shoes with pain no greater than 1-2/10.   -TR     STG 3 Progress Ongoing  -TR     STG 3 Progress Comments Reports continued difficulty but no pain   -TR     Long Term Goals    LTG Date to Achieve 04/10/18  -TR     LTG 1 Pt will score 10% or less on the modified Oswestry.   -TR     LTG 1 Progress Ongoing  -TR     LTG 2 Pt will demonstrate single leg stance for 30 seconds or greater without comensation at the hip.   -TR     LTG 2 Progress Ongoing  -TR     LTG 3 Pt will demonstrate 4/5 or greater hip abduction.   -TR     LTG 3 Progress Ongoing  -TR     LTG 4 Pt will report completing household chores with pain no greater than 1-2-/10.   -TR     LTG 4 Progress Ongoing  -TR     LTG 4 Progress Comments 4/10 at its worst with ADL's   -TR     LTG 5 Pt will report returning to gym routine with pain no greater than 1-2/10.   -TR     LTG 5 Progress Ongoing  -TR     Time Calculation    PT Goal Re-Cert Due Date 03/11/18  -TR       User Key  (r) = Recorded By, (t) = Taken By, (c) = Cosigned By    Initials Name Provider Type    TR Emma Bhardwaj PTA Physical Therapy Assistant          Therapy Education  Education Details: Continue with current HEP trying to complete twice a day  Given: HEP  Program: Reinforced  How Provided: Verbal  Provided to: Patient  Level of Understanding: Verbalized, Demonstrated              Time Calculation:   Start Time: 0800  Stop Time: 0854  Time Calculation (min): 54 min  Total Timed Code Minutes- PT: 54 minute(s)    Therapy Charges for Today     Code Description Service Date Service Provider Modifiers Qty    83691027666 HC PT MANUAL THERAPY EA 15 MIN 2/21/2018 Emma Bhardwaj  PTA GP 2    [id number]  PT THER PROC EA 15 MIN 2/21/2018 Emma Bhardwaj, FORREST GP 2                    Emma Bhardwaj PTA  2/21/2018

## 2018-02-23 ENCOUNTER — HOSPITAL ENCOUNTER (OUTPATIENT)
Dept: PHYSICAL THERAPY | Facility: HOSPITAL | Age: 53
Setting detail: THERAPIES SERIES
Discharge: HOME | End: 2018-02-23
Attending: NEUROLOGICAL SURGERY

## 2018-02-23 DIAGNOSIS — M47.816 LUMBAR SPONDYLOSIS: Primary | ICD-10-CM

## 2018-02-23 PROCEDURE — 97110 THERAPEUTIC EXERCISES: CPT

## 2018-02-23 PROCEDURE — 97140 MANUAL THERAPY 1/> REGIONS: CPT

## 2018-02-23 NOTE — THERAPY TREATMENT NOTE
Outpatient Physical Therapy Ortho Treatment Note  [facility]     Patient Name: Jyoti Pleitez  : 1965  MRN: [id number]  Today's Date: 2018      Visit Date: 2018    Visit Dx:    ICD-10-CM ICD-9-CM   1. Lumbar spondylosis M47.816 721.3       Patient Active Problem List   Diagnosis   • Neck pain   • Lumbar spondylosis   • Current non-smoker        Past Medical History:   Diagnosis Date   • Back problem         Past Surgical History:   Procedure Laterality Date   • AUGMENTATION MAMMAPLASTY     • BREAST AUGMENTATION     • FOOT SURGERY Bilateral    • HYSTERECTOMY      BIB [facility]SO for uterine fibroids in  by Dr Acosta   • OOPHORECTOMY     • TONSILLECTOMY                               PT Assessment/Plan       18 1504       PT Assessment    Assessment Comments Pt reports some stiffness after cleaning her house yesterday. Treatment today continued to focus on improving thoracic mobility as well soft tissue gaurding throughout thoracic spine and B UT. Also continued with gluteal recruitment and strengthening. Added two new components to HEP. Also addressed abdominal scar tissue today. Did some light STM to the scar to loosen soft tissue around scar   -TR     PT Plan    PT Plan Comments Continue to progress hip and core strengthening as tolerated.   -TR       User Key  (r) = Recorded By, (t) = Taken By, (c) = Cosigned By    Initials Name Provider Type    TR Emma Bhardwaj PTA Physical Therapy Assistant                    Exercises       18 1504          Subjective Comments    Subjective Comments Pt reports no pain just some increased stiffness from cleaning her house yesterday   -TR      Subjective Pain    Able to rate subjective pain? yes  -TR      Pre-Treatment Pain Level 0  -TR      Exercise 6    Exercise Name 6 hooklying on  foam roller thoracic stretch  -TR      Cueing 6 Verbal  -TR      Time (Minutes) 6 2  -TR      Exercise 7    Exercise Name 7 hooklying on  with  marches   -TR      Cueing 7 Verbal  -TR      Sets 7 3  -TR      Reps 7 15  -TR      Exercise 8    Exercise Name 8 BLE Hip abduction in hooklying   -TR      Cueing 8 Verbal  -TR      Sets 8 2  -TR      Reps 8 10  -TR      Additional Comments Yellow T-band added to HEP   -TR      Exercise 9    Exercise Name 9 Sidelying clams B  -TR      Cueing 9 Verbal  -TR      Sets 9 2  -TR      Reps 9 10   Each LE   -TR        User Key  (r) = Recorded By, (t) = Taken By, (c) = Cosigned By    Initials Name Provider Type    BETINA Bhardwaj PTA Physical Therapy Assistant                        Manual Rx (last 36 hours)      Manual Treatments       02/23/18 1504          Manual Rx 1    Manual Rx 1 Location Thoracic paraspinals and B UT   -TR      Manual Rx 1 Type STM in massage chair   -TR      Manual Rx 1 Grade min-mod Op  -TR      Manual Rx 1 Duration 8  -TR      Manual Rx 2    Manual Rx 2 Location mid-upper thoracic   -TR      Manual Rx 2 Type Extension mobilizations   -TR      Manual Rx 2 Grade 1-2  -TR      Manual Rx 2 Duration 8  -TR      Manual Rx 3    Manual Rx 3 Location Abdominal scar tissue  -TR      Manual Rx 3 Type STM with free up   -TR      Manual Rx 3 Grade min  -TR      Manual Rx 3 Duration 8  -TR        User Key  (r) = Recorded By, (t) = Taken By, (c) = Cosigned By    Initials Name Provider Type    BETINA Bhardwaj PTA Physical Therapy Assistant                PT OP Goals       02/23/18 1504       PT Short Term Goals    STG Date to Achieve 03/11/18  -TR     STG 1 Pt will score 30% or less on the modified Oswestry.   -TR     STG 1 Progress Ongoing  -TR     STG 2 Pt will report completing household chores with pain no greater than 3-4/10.   -TR     STG 2 Progress Ongoing  -TR     STG 2 Progress Comments pt reported pain at a 2/10 after cleaning yesterday with increased stiffness today  -TR     STG 3 Pt will be able to bend over to tie shoes with pain no greater than 1-2/10.   -TR     STG 3 Progress  Ongoing  -TR     Long Term Goals    LTG Date to Achieve 04/10/18  -TR     LTG 1 Pt will score 10% or less on the modified Oswestry.   -TR     LTG 1 Progress Ongoing  -TR     LTG 2 Pt will demonstrate single leg stance for 30 seconds or greater without comensation at the hip.   -TR     LTG 2 Progress Ongoing  -TR     LTG 3 Pt will demonstrate 4/5 or greater hip abduction.   -TR     LTG 3 Progress Ongoing  -TR     LTG 4 Pt will report completing household chores with pain no greater than 1-2-/10.   -TR     LTG 4 Progress Ongoing  -TR     LTG 5 Pt will report returning to gym routine with pain no greater than 1-2/10.   -TR     LTG 5 Progress Ongoing  -TR     Time Calculation    PT Goal Re-Cert Due Date 03/11/18  -TR       User Key  (r) = Recorded By, (t) = Taken By, (c) = Cosigned By    Initials Name Provider Type    TR Emma Bhardwaj PTA Physical Therapy Assistant          Therapy Education  Education Details: B clams in sidelying and hooklying B hip abduction with yellow band   Given: HEP, Symptoms/condition management  Program: New  How Provided: Verbal, Demonstration, Written  Provided to: Patient  Level of Understanding: Verbalized, Demonstrated              Time Calculation:   Start Time: 1504  Stop Time: 1550  Time Calculation (min): 46 min  Total Timed Code Minutes- PT: 46 minute(s)    Therapy Charges for Today     Code Description Service Date Service Provider Modifiers Qty    46050359023 HC PT MANUAL THERAPY EA 15 MIN 2/23/2018 Emma Bhardwaj PTA GP 2    67168003062 HC PT THER PROC EA 15 MIN 2/23/2018 Emma Bhardwaj PTA GP 1                    Emma Bhardwaj PTA  2/23/2018

## 2018-02-27 ENCOUNTER — HOSPITAL ENCOUNTER (OUTPATIENT)
Dept: PHYSICAL THERAPY | Facility: HOSPITAL | Age: 53
Setting detail: THERAPIES SERIES
Discharge: HOME | End: 2018-02-27
Attending: NEUROLOGICAL SURGERY

## 2018-02-27 DIAGNOSIS — M47.816 LUMBAR SPONDYLOSIS: Primary | ICD-10-CM

## 2018-02-27 PROCEDURE — 97140 MANUAL THERAPY 1/> REGIONS: CPT

## 2018-02-27 PROCEDURE — 97110 THERAPEUTIC EXERCISES: CPT

## 2018-02-27 NOTE — THERAPY TREATMENT NOTE
Outpatient Physical Therapy Ortho Treatment Note  [facility]     Patient Name: Jyoti Pleitez  : 1965  MRN: [id number]  Today's Date: 2018      Visit Date: 2018    Visit Dx:    ICD-10-CM ICD-9-CM   1. Lumbar spondylosis M47.816 721.3       Patient Active Problem List   Diagnosis   • Neck pain   • Lumbar spondylosis   • Current non-smoker        Past Medical History:   Diagnosis Date   • Back problem         Past Surgical History:   Procedure Laterality Date   • AUGMENTATION MAMMAPLASTY     • BREAST AUGMENTATION     • FOOT SURGERY Bilateral    • HYSTERECTOMY      BIB [facility]SO for uterine fibroids in  by Dr Acosta   • OOPHORECTOMY     • TONSILLECTOMY                               PT Assessment/Plan       18 1503       PT Assessment    Assessment Comments Pt reports no pain. Pt reports that she only has stiffness. Pt does report new discomfort in tail bone with prolonged sitting. Treatment focused on decreasing softtissue restrictions in lumbar spine and progressing hip and core exercises. Pt more unstable today with core exercises and required increasaed cueing for form. NNow that pt reports improvement with pain we will continue to progress with strengthning of B hips and core.   -TR     PT Plan    PT Plan Comments See assesment.   -TR       User Key  (r) = Recorded By, (t) = Taken By, (c) = Cosigned By    Initials Name Provider Type    TR Emma Bhardwaj PTA Physical Therapy Assistant                    Exercises       18 1503          Subjective Comments    Subjective Comments Pt reports that she is having some pain in her tailbone with sitting and with exercises. PT reports no pain in the last few weeks just stiffness   -TR      Subjective Pain    Able to rate subjective pain? yes  -TR      Pre-Treatment Pain Level 0  -TR      Post-Treatment Pain Level 0  -TR      Subjective Pain Comment Tailbone   -TR      Exercise 1    Exercise Name 1 B hip extension and abduction  sidelying with 45 cm SB   -TR      Cueing 1 Verbal;Tactile  -TR      Sets 1 1  -TR      Reps 1 15   BLE   -TR      Exercise 2    Exercise Name 2 Hooklying on 1/2 foam roller marches with TA contraction and no UE support.   -TR      Cueing 2 Verbal  -TR      Sets 2 2  -TR      Reps 2 15  -TR      Additional Comments More unsteady today.   -TR      Exercise 3    Exercise Name 3 Sititng on 55cm SB with ball on wall marches with hands on hips and then hands out in front of her  -TR      Cueing 3 Verbal  -TR      Sets 3 2  -TR      Reps 3 15  -TR      Exercise 4    Exercise Name 4 Isometric B hip adduction with green ball   -TR      Cueing 4 Verbal  -TR      Sets 4 1  -TR      Reps 4 20  -TR      Exercise 5    Exercise Name 5 isometric L pelvic rotation with 45 cm SB   -TR      Cueing 5 Verbal  -TR      Sets 5 1  -TR      Reps 5 20  -TR      Exercise 6    Exercise Name 6 BLE muscle synergy   -TR      Cueing 6 Verbal  -TR      Reps 6 10  -TR        User Key  (r) = Recorded By, (t) = Taken By, (c) = Cosigned By    Initials Name Provider Type    BETINA Bhardwaj PTA Physical Therapy Assistant                        Manual Rx (last 36 hours)      Manual Treatments       02/27/18 1503          Manual Rx 1    Manual Rx 1 Location B lumbr paraspinals   -TR      Manual Rx 1 Type STM with free up in massage chair   -TR      Manual Rx 1 Grade mod OP   -TR      Manual Rx 1 Duration 15  -TR        User Key  (r) = Recorded By, (t) = Taken By, (c) = Cosigned By    Initials Name Provider Type    BETINA Bhardwaj PTA Physical Therapy Assistant                PT OP Goals       02/27/18 1503       PT Short Term Goals    STG Date to Achieve 03/11/18  -TR     STG 1 Pt will score 30% or less on the modified Oswestry.   -TR     STG 1 Progress Ongoing  -TR     STG 2 Pt will report completing household chores with pain no greater than 3-4/10.   -TR     STG 2 Progress Ongoing  -TR     STG 2 Progress Comments Pt reports no pain just  increased stiffness  -TR     STG 3 Pt will be able to bend over to tie shoes with pain no greater than 1-2/10.   -TR     STG 3 Progress Met  -TR     STG 3 Progress Comments No pain  -TR     Long Term Goals    LTG Date to Achieve 04/10/18  -TR     LTG 1 Pt will score 10% or less on the modified Oswestry.   -TR     LTG 1 Progress Ongoing  -TR     LTG 2 Pt will demonstrate single leg stance for 30 seconds or greater without comensation at the hip.   -TR     LTG 2 Progress Ongoing  -TR     LTG 3 Pt will demonstrate 4/5 or greater hip abduction.   -TR     LTG 3 Progress Ongoing  -TR     LTG 4 Pt will report completing household chores with pain no greater than 1-2-/10.   -TR     LTG 4 Progress Ongoing  -TR     LTG 4 Progress Comments only reporting stiffness  -TR     LTG 5 Pt will report returning to gym routine with pain no greater than 1-2/10.   -TR     LTG 5 Progress Ongoing  -TR     Time Calculation    PT Goal Re-Cert Due Date 03/11/18  -TR       User Key  (r) = Recorded By, (t) = Taken By, (c) = Cosigned By    Initials Name Provider Type    TR Emma Bhardwaj PTA Physical Therapy Assistant          Therapy Education  Education Details: Gave Red T-band to advance previous exercise  Given: HEP  Program: Progressed  How Provided: Verbal, Demonstration  Provided to: Patient  Level of Understanding: Demonstrated, Verbalized              Time Calculation:   Start Time: 1503  Stop Time: 1546  Time Calculation (min): 43 min  Total Timed Code Minutes- PT: 43 minute(s)    Therapy Charges for Today     Code Description Service Date Service Provider Modifiers Qty    37816002672 HC PT MANUAL THERAPY EA 15 MIN 2/27/2018 Emma Bhardwaj PTA GP 1    36495514333 HC PT THER PROC EA 15 MIN 2/27/2018 Emma Bhardwaj PTA GP 2                    Emma Bhardwaj PTA  2/27/2018

## 2018-03-01 ENCOUNTER — OFFICE VISIT (OUTPATIENT)
Dept: OBSTETRICS AND GYNECOLOGY | Facility: CLINIC | Age: 53
End: 2018-03-01

## 2018-03-01 ENCOUNTER — HOSPITAL ENCOUNTER (OUTPATIENT)
Dept: PHYSICAL THERAPY | Facility: HOSPITAL | Age: 53
Setting detail: THERAPIES SERIES
Discharge: HOME | End: 2018-03-01
Attending: NEUROLOGICAL SURGERY

## 2018-03-01 ENCOUNTER — TELEPHONE (OUTPATIENT)
Dept: OBSTETRICS AND GYNECOLOGY | Facility: CLINIC | Age: 53
End: 2018-03-01

## 2018-03-01 VITALS
SYSTOLIC BLOOD PRESSURE: 126 MMHG | DIASTOLIC BLOOD PRESSURE: 70 MMHG | BODY MASS INDEX: 22.99 KG/M2 | HEIGHT: 65 IN | WEIGHT: 138 LBS

## 2018-03-01 DIAGNOSIS — Z78.0 MENOPAUSE: Primary | ICD-10-CM

## 2018-03-01 DIAGNOSIS — Z87.891 FORMER SMOKER: ICD-10-CM

## 2018-03-01 DIAGNOSIS — M47.816 LUMBAR SPONDYLOSIS: Primary | ICD-10-CM

## 2018-03-01 DIAGNOSIS — R68.82 LIBIDO, DECREASED: ICD-10-CM

## 2018-03-01 PROCEDURE — 99213 OFFICE O/P EST LOW 20 MIN: CPT | Performed by: OBSTETRICS & GYNECOLOGY

## 2018-03-01 PROCEDURE — 97140 MANUAL THERAPY 1/> REGIONS: CPT

## 2018-03-01 PROCEDURE — 97110 THERAPEUTIC EXERCISES: CPT

## 2018-03-01 RX ORDER — MELOXICAM 7.5 MG/1
7.5 TABLET ORAL DAILY
COMMUNITY
End: 2019-05-10

## 2018-03-01 NOTE — PROGRESS NOTES
"Subjective   Chief Complaint   Patient presents with   • Med Refill     Pt is here for a refill on her Testosterone cream to [facility].       Jyoti Pleitez is a 52 y.o. year old .  No LMP recorded. Patient has had a hysterectomy.  She presents to be seen because of need for refill on her HRT.  She has recently had a URI and also had some back problems that bothered her for about a month - but she is improving with PT.  Patient is taking Provera and Estrace, but also takes compounded Testosterone cream that she says improves her mental focus.  She says that it used to help with sex drive, but she does not feel like it improves her libido anymore.  She is hoping libido will get better when she can get back into her regular exercise routine.     The following portions of the patient's history were reviewed and updated as appropriate:current medications and allergies    Smoking status: Former Smoker                                                              Packs/day: 0.00      Years: 14.00        Types: Cigarettes  Smokeless status: Never Used                        Review of Systems   Constitutional: Negative for activity change and unexpected weight change.   Respiratory: Negative for shortness of breath.    Cardiovascular: Negative for chest pain.   Genitourinary: Negative for vaginal bleeding, vaginal discharge and vaginal pain.        Decreased libido   Psychiatric/Behavioral: Positive for decreased concentration (she feels like HRT makes a significant difference).         Objective   /70  Ht 165.1 cm (65\")  Wt 62.6 kg (138 lb)  BMI 22.96 kg/m2    Physical Exam   Constitutional: She is oriented to person, place, and time. She appears well-developed and well-nourished. No distress.   HENT:   Head: Normocephalic and atraumatic.   Eyes: EOM are normal.   Neck: Normal range of motion. No thyromegaly present.   Cardiovascular: Regular rhythm.    No murmur heard.  Pulmonary/Chest: Effort " normal. She has no wheezes.   Abdominal: Soft. She exhibits no distension. There is no tenderness.   Musculoskeletal: Normal range of motion.   Neurological: She is alert and oriented to person, place, and time.   Skin: Skin is warm and dry.   Psychiatric: She has a normal mood and affect. Her behavior is normal. Judgment normal.   Nursing note and vitals reviewed.      Lab Review   CMP and testosterone from November    Imaging   No data reviewed     Assessment & Plan  Jyoti was seen today for med refill.    Diagnoses and all orders for this visit:    Menopause: She reports hormone replacement therapy does help with mental clarity, but does not feel like testosterone has been helping the past 6 months.  When she initially started the testosterone replacement the patient felt that her libido was improved; this has not been treated recently.  The patient would like to try a different compounding pharmacy.  No new labs were drawn today; we will plan to redraw her testosterone when she returns in 3 months.  A new prescription has been called to [facility].    Former smoker    BMI 23.0-23.9, adult    Libido, decreased        This note was electronically signed.    Rossy Georges MD  March 1, 2018  10:14 AM    Total time spent today with Jyoti  was 20 minutes (level 3).  Greater than 50% of the time was spent coordinating care, answering her questions and counseling regarding pathophysiology of her presenting problem along with plans for any diagnositc work-up and treatment.

## 2018-03-01 NOTE — TELEPHONE ENCOUNTER
----- Message from Rossy Georges MD sent at 3/1/2018 10:23 AM CST -----  Please call in 1% testosterone cream, 1 gram per day, with refills for 90 days.  Patient wants to use  pharmacy.  Thanks

## 2018-03-01 NOTE — THERAPY TREATMENT NOTE
Outpatient Physical Therapy Ortho Treatment Note  [facility]     Patient Name: Jyoti Pleitez  : 1965  MRN: [id number]  Today's Date: 3/1/2018      Visit Date: 2018    Visit Dx:    ICD-10-CM ICD-9-CM   1. Lumbar spondylosis M47.816 721.3       Patient Active Problem List   Diagnosis   • Neck pain   • Lumbar spondylosis   • Current non-smoker        Past Medical History:   Diagnosis Date   • Back problem         Past Surgical History:   Procedure Laterality Date   • AUGMENTATION MAMMAPLASTY     • BREAST AUGMENTATION     • FOOT SURGERY Bilateral    • HYSTERECTOMY      BIB [facility] for uterine fibroids in  by Dr Acosta   • OOPHORECTOMY     • TONSILLECTOMY                               PT Assessment/Plan       18 1500       PT Assessment    Assessment Comments Pt continues to improve with hip and core strength with no complaints of pain. Pt has not been doing much the past few days so we will see how her pain and stiffness is following the weeked. Progressed hip and core exercises  quite a bit today which pt tolerated well. With bridges with BLE kickout pt had difficulty with L pelvic control and was given this as part of HEP to improve. Also progressed previous exercises given.   -TR     PT Plan    PT Plan Comments Continue to progress with hip and core strengthening as well as balance.   -TR       User Key  (r) = Recorded By, (t) = Taken By, (c) = Cosigned By    Initials Name Provider Type    BETINA Bhardwaj PTA Physical Therapy Assistant                    Exercises       18 1500          Subjective Comments    Subjective Comments Pt reports that she is feeling really good however has not done much the past few days  -TR      Subjective Pain    Able to rate subjective pain? yes  -TR      Pre-Treatment Pain Level 0  -TR      Post-Treatment Pain Level 0  -TR      Exercise 1    Exercise Name 1 B sidelying hip abduction with ball on wall   -TR      Cueing 1 Verbal;Tactile  -TR       Sets 1 2  -TR      Reps 1 10  -TR      Exercise 2    Exercise Name 2 Bridges   -TR      Cueing 2 Verbal  -TR      Sets 2 2  -TR      Reps 2 10  -TR      Exercise 3    Exercise Name 3 Bridges with LE kicks   -TR      Cueing 3 Demo  -TR      Sets 3 2  -TR      Reps 3 10  -TR      Exercise 4    Exercise Name 4 B sidelying clams with red T-band   -TR      Cueing 4 Verbal;Tactile  -TR      Sets 4 1  -TR      Reps 4 15  -TR      Additional Comments Pt instructed to start using yellow T-band at home for this exercise  -TR      Exercise 5    Exercise Name 5 See Balance section   -TR        User Key  (r) = Recorded By, (t) = Taken By, (c) = Cosigned By    Initials Name Provider Type    BETINA Bhardwaj PTA Physical Therapy Assistant                   Balance Exercises (last 24 hours)      Balance Exercises       03/01/18 1500          Midline Orientation    Activity On rocker board;Eyes open;Eyes closed   external nudges in all directions  -TR      Cueing Verbal  -TR      Time (minutes) 2  -TR      Stand with Feet Together    Activity On foam;Eyes open;Eyes closed  -TR      Cueing Verbal  -TR      Reps 4  -TR      Time (minutes) 2  -TR      Time (seconds) 30seconds each   -TR       Tandem    Activity On foam;Eyes closed  -TR      Time (seconds) 20 seconds with mod sway   -TR      Single Leg Stance    Activity On foam;Eyes open  -TR      Time (seconds) 15 seconds BLE   -TR        User Key  (r) = Recorded By, (t) = Taken By, (c) = Cosigned By    Initials Name Provider Type    TR Emma Bhardwaj PTA Physical Therapy Assistant              Manual Rx (last 36 hours)      Manual Treatments       03/01/18 1500          Manual Rx 1    Manual Rx 1 Location B lumbr paraspinals   -TR      Manual Rx 1 Type STM with free up in massage chair   -TR      Manual Rx 1 Grade mod OP   -TR      Manual Rx 1 Duration 15  -TR        User Key  (r) = Recorded By, (t) = Taken By, (c) = Cosigned By    Initials Name Provider Type     TR Emma Bhardwaj PTA Physical Therapy Assistant                PT OP Goals       03/01/18 1500       PT Short Term Goals    STG Date to Achieve 03/11/18  -TR     STG 1 Pt will score 30% or less on the modified Oswestry.   -TR     STG 1 Progress Ongoing  -TR     STG 2 Pt will report completing household chores with pain no greater than 3-4/10.   -TR     STG 2 Progress Ongoing  -TR     STG 3 Pt will be able to bend over to tie shoes with pain no greater than 1-2/10.   -TR     STG 3 Progress Met  -TR     Long Term Goals    LTG Date to Achieve 04/10/18  -TR     LTG 1 Pt will score 10% or less on the modified Oswestry.   -TR     LTG 1 Progress Ongoing  -TR     LTG 2 Pt will demonstrate single leg stance for 30 seconds or greater without comensation at the hip.   -TR     LTG 2 Progress Ongoing  -TR     LTG 3 Pt will demonstrate 4/5 or greater hip abduction.   -TR     LTG 3 Progress Ongoing  -TR     LTG 3 Progress Comments progressed exercises today   -TR     LTG 4 Pt will report completing household chores with pain no greater than 1-2-/10.   -TR     LTG 4 Progress Ongoing  -TR     LTG 5 Pt will report returning to gym routine with pain no greater than 1-2/10.   -TR     LTG 5 Progress Ongoing  -TR     LTG 5 Progress Comments Pt has not returned to the gym at this time   -TR     Time Calculation    PT Goal Re-Cert Due Date 03/11/18  -TR       User Key  (r) = Recorded By, (t) = Taken By, (c) = Cosigned By    Initials Name Provider Type    BETINA Bhardwaj PTA Physical Therapy Assistant          Therapy Education  Education Details: Bridges with BLE kicks and progressed to yellow badn with sidelying clams   Given: HEP  Program: New  How Provided: Verbal, Demonstration, Written  Provided to: Patient  Level of Understanding: Verbalized, Demonstrated              Time Calculation:   Start Time: 1500  Stop Time: 1548  Time Calculation (min): 48 min  Total Timed Code Minutes- PT: 48 minute(s)    Therapy Charges  for Today     Code Description Service Date Service Provider Modifiers Qty    37776367871 HC PT MANUAL THERAPY EA 15 MIN 3/1/2018 Emma Bhardwaj PTA GP 1    45712381953 HC PT THER PROC EA 15 MIN 3/1/2018 Emma Bhardwaj PTA GP 2                    Emma Bhardwaj PTA  3/1/2018

## 2018-03-05 ENCOUNTER — HOSPITAL ENCOUNTER (OUTPATIENT)
Dept: PHYSICAL THERAPY | Facility: HOSPITAL | Age: 53
Setting detail: THERAPIES SERIES
Discharge: HOME | End: 2018-03-05
Attending: NEUROLOGICAL SURGERY

## 2018-03-05 DIAGNOSIS — M47.816 LUMBAR SPONDYLOSIS: Primary | ICD-10-CM

## 2018-03-05 PROCEDURE — 97110 THERAPEUTIC EXERCISES: CPT

## 2018-03-05 NOTE — THERAPY TREATMENT NOTE
Outpatient Physical Therapy Ortho Treatment Note  [facility]     Patient Name: Jyoti Pleitez  : 1965  MRN: [id number]  Today's Date: 3/5/2018      Visit Date: 2018    Visit Dx:    ICD-10-CM ICD-9-CM   1. Lumbar spondylosis M47.816 721.3       Patient Active Problem List   Diagnosis   • Neck pain   • Lumbar spondylosis   • Current non-smoker        Past Medical History:   Diagnosis Date   • Back problem         Past Surgical History:   Procedure Laterality Date   • AUGMENTATION MAMMAPLASTY     • BREAST AUGMENTATION     • FOOT SURGERY Bilateral    • HYSTERECTOMY      BIB [facility] for uterine fibroids in  by Dr Acosta   • OOPHORECTOMY     • TONSILLECTOMY                               PT Assessment/Plan       18 0900       PT Assessment    Assessment Comments Pt continues to report no pain with only some soreness in hips in the morning. Pt's B hip abduction strength continues to improve as well as her ability to tolerate progressing hip and core strength. RLE however still weaker than Left. Pt's modified owestry score had improved to only 12% at this time. Pt educated on return to gym and will be returning to gym this weeked. Two more visits scheduled to address her return to the gym and further progress towards goals.   -TR     PT Plan    PT Plan Comments Educate on proper form on treadmill and gym activties.   -TR       User Key  (r) = Recorded By, (t) = Taken By, (c) = Cosigned By    Initials Name Provider Type    BETINA Bhardwaj PTA Physical Therapy Assistant                    Exercises       18 0800          Subjective Comments    Subjective Comments Pt reports that her hips are sore in the morning   -TR      Subjective Pain    Able to rate subjective pain? yes  -TR      Pre-Treatment Pain Level 0  -TR      Post-Treatment Pain Level 0  -TR      Exercise 1    Exercise Name 1 B sidelying hip abduction with ball on wall   -TR      Cueing 1 Verbal;Tactile  -TR      Sets 1 2   -TR      Reps 1 10  -TR      Exercise 2    Exercise Name 2 Bridges with green T-band around knees into hip abduction   -TR      Cueing 2 Verbal  -TR      Sets 2 2  -TR      Reps 2 10  -TR      Exercise 3    Exercise Name 3 Bridges with LE kicks   -TR      Cueing 3 Demo  -TR      Sets 3 2  -TR      Reps 3 10  -TR      Exercise 4    Exercise Name 4 Bridges with LE's on red 55cm SB   -TR      Cueing 4 Verbal;Tactile  -TR      Sets 4 1  -TR      Reps 4 15  -TR      Exercise 5    Exercise Name 5 Bird dog on mat table with red 55cm SB under abdomen   -TR      Cueing 5 Verbal  -TR      Sets 5 1  -TR      Reps 5 15  -TR      Exercise 6    Exercise Name 6 Planks   -TR      Cueing 6 Verbal  -TR      Sets 6 3  -TR      Time (Seconds) 6 15  -TR      Additional Comments Added to HEP   -TR      Exercise 7    Exercise Name 7 Seated marching on 65 cm SB   -TR      Cueing 7 Verbal  -TR      Sets 7 2  -TR      Time (Minutes) 7 1  -TR      Exercise 8    Exercise Name 8 BLE Standing hip abduction with yellow T-band   -TR      Cueing 8 Verbal  -TR      Sets 8 1  -TR      Reps 8 10  -TR        User Key  (r) = Recorded By, (t) = Taken By, (c) = Cosigned By    Initials Name Provider Type    TR Emma Bhardwaj, PTA Physical Therapy Assistant                               PT OP Goals       03/05/18 0800       PT Short Term Goals    STG Date to Achieve 03/11/18  -TR     STG 1 Pt will score 30% or less on the modified Oswestry.   -TR     STG 1 Progress Met  -TR     STG 1 Progress Comments 12% today   -TR     STG 2 Pt will report completing household chores with pain no greater than 3-4/10.   -TR     STG 2 Progress Met  -TR     STG 2 Progress Comments pt reports cleaning her house and working outside her house lifting mulch with no increase ini pain.   -TR     STG 3 Pt will be able to bend over to tie shoes with pain no greater than 1-2/10.   -TR     STG 3 Progress Met  -TR     Long Term Goals    LTG Date to Achieve 04/10/18  -TR     LTG 1  Pt will score 10% or less on the modified Oswestry.   -TR     LTG 1 Progress Progressing  -TR     LTG 1 Progress Comments 12% today   -TR     LTG 2 Pt will demonstrate single leg stance for 30 seconds or greater without comensation at the hip.   -TR     LTG 2 Progress Ongoing  -TR     LTG 3 Pt will demonstrate 4/5 or greater hip abduction.   -TR     LTG 3 Progress Ongoing  -TR     LTG 4 Pt will report completing household chores with pain no greater than 1-2-/10.   -TR     LTG 4 Progress Met  -TR     LTG 5 Pt will report returning to gym routine with pain no greater than 1-2/10.   -TR     LTG 5 Progress Ongoing  -TR     Time Calculation    PT Goal Re-Cert Due Date 03/11/18  -TR       User Key  (r) = Recorded By, (t) = Taken By, (c) = Cosigned By    Initials Name Provider Type    BETINA Bhardwaj PTA Physical Therapy Assistant          Therapy Education  Education Details: Standing hip abduction with yellow band and planks   Given: HEP  Program: New  How Provided: Verbal, Demonstration, Written  Provided to: Patient  Level of Understanding: Verbalized, Demonstrated    Outcome Measure Options: Cody Frey  Modified Oswestry  Modified Oswestry Score/Comments: 12%      Time Calculation:   Start Time: 0800  Stop Time: 0850  Time Calculation (min): 50 min  Total Timed Code Minutes- PT: 50 minute(s)    Therapy Charges for Today     Code Description Service Date Service Provider Modifiers Qty    41239820908 HC PT THER PROC EA 15 MIN 3/5/2018 Emma Bhardwaj PTA GP 3          PT G-Codes  Outcome Measure Options: Modifed Owestry         Emma Bhardwaj PTA  3/5/2018

## 2018-03-07 ENCOUNTER — HOSPITAL ENCOUNTER (OUTPATIENT)
Dept: PHYSICAL THERAPY | Facility: HOSPITAL | Age: 53
Setting detail: THERAPIES SERIES
Discharge: HOME | End: 2018-03-07
Attending: NEUROLOGICAL SURGERY

## 2018-03-07 DIAGNOSIS — M47.816 LUMBAR SPONDYLOSIS: Primary | ICD-10-CM

## 2018-03-07 PROCEDURE — 97110 THERAPEUTIC EXERCISES: CPT

## 2018-03-07 NOTE — THERAPY PROGRESS REPORT/RE-CERT
Outpatient Physical Therapy Ortho Progress Note   [facility]     Patient Name: Jyoti Pleitez  : 1965  MRN: [id number]  Today's Date: 3/7/2018      Visit Date: 2018    Visit Dx:    ICD-10-CM ICD-9-CM   1. Lumbar spondylosis M47.816 721.3       Patient Active Problem List   Diagnosis   • Neck pain   • Lumbar spondylosis   • Current non-smoker        Past Medical History:   Diagnosis Date   • Back problem         Past Surgical History:   Procedure Laterality Date   • AUGMENTATION MAMMAPLASTY     • BREAST AUGMENTATION     • FOOT SURGERY Bilateral    • HYSTERECTOMY      BIB [facility] for uterine fibroids in  by Dr Acosta   • OOPHORECTOMY     • TONSILLECTOMY                               PT Assessment/Plan       18 0800       PT Assessment    Functional Limitations Limitation in home management;Limitations in community activities;Performance in leisure activities;Performance in self-care ADL  -RS     Impairments Balance;Gait;Pain;Muscle strength;Posture;Range of motion  -RS     Assessment Comments Pt has met 6 of her 8 goals at this time. Pt continues to report no pain but some continued stiffness in the B low back. Pt hip and core strength has continues to improve as well as here balance. Pt measured 4/5 strength on B hip abduction today and was able to SLS on foam for 30 seconds with only minimal sway. Pt still has poor control with advanced core exercises but with cues was able to improve. Pts pelvic rotation and body mechanics are improved and was even tested on the treadmill. Pt is going to return to the gym [facility] next treatment so we can further assess how she is going to do getting back into her normal routine. Pts modified owswestry score has imporved to 12% from the 52% on inital eval. We will spend her last two treatments focusing on contiuing to progress hip and core strength as well as progress HEP for her gym rountine.   -TR     Please refer to paper survey for additional  self-reported information Yes  -RS     Rehab Potential Good  -RS     Patient/caregiver participated in establishment of treatment plan and goals Yes  -RS     Patient would benefit from skilled therapy intervention Yes  -RS     PT Plan    PT Frequency 2x/week  -RS     Predicted Duration of Therapy Intervention (days/wks) 1 week  -RS     Planned CPT's? PT THER PROC EA 15 MIN: 62270;PT MANUAL THERAPY EA 15 MIN: 70824;PT NEUROMUSC RE-EDUCATION EA 15 MIN: 17999;PT GAIT TRAINING EA 15 MIN: 53085;PT HOT OR COLD PACK TREAT [facility];PT ELECTRICAL STIM UNATTEND: ;PT ELECTRICAL STIM ATTD EA 15 MIN: 17711  -RS     Physical Therapy Interventions (Optional Details) balance training;swiss ball techniques;taping;transfer training;strengthening;stair training;postural re-education;patient/family education;neuromuscular re-education;modalities;manual therapy techniques;lumbar stabilization;home exercise program;joint mobilization;gait training  -RS     PT Plan Comments We will work towards progressing pt's HEP to be more gym focused and make sure she is back to her reqular routine. Continue with challenging hip and core activities.   -TR       User Key  (r) = Recorded By, (t) = Taken By, (c) = Cosigned By    Initials Name Provider Type    TR Emma Bhardwaj, PTA Physical Therapy Assistant    RS Roddy Rouse, PT DPT Physical Therapist                    Exercises       03/07/18 0800          Subjective Comments    Subjective Comments Pt reports some increased soreness waking up yesterday and not being able to do her exercises.   -TR      Subjective Pain    Able to rate subjective pain? yes  -TR      Pre-Treatment Pain Level 0  -TR      Exercise 1    Exercise Name 1 B hamstrings stretches   -TR      Cueing 1 Verbal;Tactile  -TR      Sets 1 3  -TR      Time (Seconds) 1 30  -TR      Exercise 2    Exercise Name 2 Bridges with green T-band around knees into hip abduction   -TR      Cueing 2 Verbal  -TR      Sets 2 2  -TR       Reps 2 10  -TR      Exercise 3    Exercise Name 3 Bird dogs with 55cm SB under abdomen   -TR      Cueing 3 Verbal  -TR      Sets 3 2  -TR      Reps 3 10  -TR      Exercise 4    Exercise Name 4 Tandem stance EO/EC on blue theradiscs  -TR      Cueing 4 Verbal  -TR      Time (Seconds) 4 45 seconds EC, 10 seconds max EC   -TR      Exercise 5    Exercise Name 5 NBOS EC on blue theradiscs   -TR      Cueing 5 Verbal  -TR      Exercise 6    Exercise Name 6 SLS EO on and off foam   -TR      Time (Seconds) 6 30 seconds on and off foam bilaterally   -TR      Additional Comments 2 attempts on foam with LLE to hip   -TR      Exercise 7    Exercise Name 7 SLS on foam with opposite LE cone tapping   -TR      Exercise 8    Exercise Name 8 Sit to stands on wobble board A/P   -TR      Cueing 8 Demo  -TR      Sets 8 1  -TR      Reps 8 5  -TR      Exercise 9    Exercise Name 9 Treadmill Level 2   -TR      Time (Minutes) 9 5  -TR        User Key  (r) = Recorded By, (t) = Taken By, (c) = Cosigned By    Initials Name Provider Type    BETINA Bhardwaj, PTA Physical Therapy Assistant                               PT OP Goals       03/07/18 0800       PT Short Term Goals    STG Date to Achieve 03/11/18  -TR     STG 1 Pt will score 30% or less on the modified Oswestry.   -TR     STG 1 Progress Met  -TR     STG 1 Progress Comments 12% last treatment   -TR     STG 2 Pt will report completing household chores with pain no greater than 3-4/10.   -TR     STG 2 Progress Met  -TR     STG 3 Pt will be able to bend over to tie shoes with pain no greater than 1-2/10.   -TR     STG 3 Progress Met  -TR     Long Term Goals    LTG Date to Achieve 04/10/18  -TR     LTG 1 Pt will score 10% or less on the modified Oswestry.   -TR     LTG 1 Progress Progressing  -TR     LTG 1 Progress Comments 12% last treatment   -TR     LTG 2 Pt will demonstrate single leg stance for 30 seconds or greater without comensation at the hip.   -TR     LTG 2 Progress Met   -TR     LTG 2 Progress Comments 30 seconds on BLE today   -TR     LTG 3 Pt will demonstrate 4/5 or greater hip abduction.   -TR     LTG 3 Progress Met  -TR     LTG 3 Progress Comments 4/5 bilaterally today   -TR     LTG 4 Pt will report completing household chores with pain no greater than 1-2-/10.   -TR     LTG 4 Progress Met  -TR     LTG 5 Pt will report returning to gym routine with pain no greater than 1-2/10.   -TR     LTG 5 Progress Ongoing  -TR     LTG 5 Progress Comments Pt has not yet returned to gym however is going to attempt this weekend so we can reasses how she did next session.   -TR     Time Calculation    PT Goal Re-Cert Due Date 03/11/18  -TR       User Key  (r) = Recorded By, (t) = Taken By, (c) = Cosigned By    Initials Name Provider Type    BETINA Bhardwaj PTA Physical Therapy Assistant          Therapy Education  Education Details: Treadmill mechanics, exercises at the gym   Given: HEP  Program: Reinforced  How Provided: Verbal, Demonstration  Provided to: Patient  Level of Understanding: Verbalized, Demonstrated              Time Calculation:   Start Time: 0800  Stop Time: 0847  Time Calculation (min): 47 min  Total Timed Code Minutes- PT: 47 minute(s)    Therapy Charges for Today     Code Description Service Date Service Provider Modifiers Qty    21144985025 HC PT THER PROC EA 15 MIN 3/7/2018 mEma Bhardwaj PTA GP 3                    Emma Bhardwaj PTA  3/7/2018

## 2018-03-14 ENCOUNTER — HOSPITAL ENCOUNTER (OUTPATIENT)
Dept: PHYSICAL THERAPY | Facility: HOSPITAL | Age: 53
Setting detail: THERAPIES SERIES
Discharge: HOME | End: 2018-03-14
Attending: NEUROLOGICAL SURGERY

## 2018-03-14 DIAGNOSIS — M47.816 LUMBAR SPONDYLOSIS: Primary | ICD-10-CM

## 2018-03-14 PROCEDURE — 97140 MANUAL THERAPY 1/> REGIONS: CPT

## 2018-03-14 PROCEDURE — 97110 THERAPEUTIC EXERCISES: CPT

## 2018-03-14 NOTE — THERAPY TREATMENT NOTE
Outpatient Physical Therapy Ortho Treatment Note  [facility]     Patient Name: Jyoti Pleitez  : 1965  MRN: [id number]  Today's Date: 3/14/2018      Visit Date: 2018    Visit Dx:    ICD-10-CM ICD-9-CM   1. Lumbar spondylosis M47.816 721.3       Patient Active Problem List   Diagnosis   • Neck pain   • Lumbar spondylosis   • Current non-smoker        Past Medical History:   Diagnosis Date   • Back problem         Past Surgical History:   Procedure Laterality Date   • AUGMENTATION MAMMAPLASTY     • BREAST AUGMENTATION     • FOOT SURGERY Bilateral    • HYSTERECTOMY      BIB [facility] for uterine fibroids in  by Dr Acosta   • OOPHORECTOMY     • TONSILLECTOMY                               PT Assessment/Plan     Row Name 18 0515          PT Assessment    Assessment Comments Pt reports some increased soreness in her R hip and groin. Pt reports she has not yet returned to the gym due to this increased soreness. Decreased frequency of HEP to see if this helps with soreness. Spent some time on soft tissue work to R hip, glutes, and IT band due to increased soreness. Continued with hip and core activties. Pt has one scheduled visit left in which we can hopefully address how she does at the gym and furher make modifications to her rountine if needed.   -TR        PT Plan    PT Plan Comments Address affects of pt going to the gym, will be ready for D/C.   -TR       User Key  (r) = Recorded By, (t) = Taken By, (c) = Cosigned By    Initials Name Provider Type    BETINA Bhardwaj PTA Physical Therapy Assistant                    Exercises     Row Name 18 9167             Subjective Comments    Subjective Comments Pt reports that she was not able to go to the gym due to increased soreness on . Pt reports that the standing hip abduction is really hard for her.   -TR         Subjective Pain    Able to rate subjective pain? yes  -TR      Pre-Treatment Pain Level 0  -TR      Post-Treatment  Pain Level 0  -TR      Subjective Pain Comment No pain just soreness throughout R hip and groin   -TR         Exercise 1    Exercise Name 1 BLE marching in hooklying on 1/2 foam roller   -TR      Cueing 1 Verbal;Tactile  -TR      Sets 1 1  -TR      Reps 1 20 each LE  -TR         Exercise 2    Exercise Name 2 1/2 foam pec strech in hooklying   -TR      Cueing 2 Verbal  -TR      Time 2 3 minutes   -TR         Exercise 3    Exercise Name 3 Bridges with LLE kicks focusing on neutral pelvis   -TR      Cueing 3 Verbal  -TR      Sets 3 1  -TR      Reps 3 20  -TR         Exercise 4    Exercise Name 4 Side planks   -TR      Cueing 4 Verbal  -TR      Sets 4 3  -TR      Time 4 30 seconds   -TR         Exercise 5    Exercise Name 5 Seated marching on 75 cm SB   -TR      Cueing 5 Verbal  -TR      Sets 5 3  -TR      Reps 5 20  -TR         Exercise 6    Exercise Name 6 Lifting 20# box  with proper technique   -TR      Cueing 6 Verbal;Tactile;Demo  -TR         Exercise 7    Exercise Name 7 Squats with focus on proper form   -TR      Cueing 7 Verbal;Tactile  -TR      Sets 7 1  -TR      Reps 7 20  -TR         Exercise 8    Exercise Name 8 Disscused proper body mechanics with lifting and exercising at the gym.   -TR        User Key  (r) = Recorded By, (t) = Taken By, (c) = Cosigned By    Initials Name Provider Type    BETINA Bhardwaj PTA Physical Therapy Assistant                        Manual Rx (last 36 hours)      Manual Treatments     Row Name 03/14/18 0757             Manual Rx 1    Manual Rx 1 Location R ITB and glutes   -TR      Manual Rx 1 Type STM with ridged foam roller   -TR      Manual Rx 1 Grade mod   -TR      Manual Rx 1 Duration 10  -TR        User Key  (r) = Recorded By, (t) = Taken By, (c) = Cosigned By    Initials Name Provider Type    BETINA Bhardwaj PTA Physical Therapy Assistant                PT OP Goals     Row Name 03/14/18 0757          PT Short Term Goals    STG Date to Achieve 03/11/18  -TR      STG 1 Pt will score 30% or less on the modified Oswestry.   -TR     STG 1 Progress Met  -TR     STG 2 Pt will report completing household chores with pain no greater than 3-4/10.   -TR     STG 2 Progress Met  -TR     STG 3 Pt will be able to bend over to tie shoes with pain no greater than 1-2/10.   -TR     STG 3 Progress Met  -TR        Long Term Goals    LTG Date to Achieve 04/10/18  -TR     LTG 1 Pt will score 10% or less on the modified Oswestry.   -TR     LTG 1 Progress Progressing  -TR     LTG 2 Pt will demonstrate single leg stance for 30 seconds or greater without comensation at the hip.   -TR     LTG 2 Progress Met  -TR     LTG 3 Pt will demonstrate 4/5 or greater hip abduction.   -TR     LTG 3 Progress Met  -TR     LTG 4 Pt will report completing household chores with pain no greater than 1-2-/10.   -TR     LTG 4 Progress Met  -TR     LTG 5 Pt will report returning to gym routine with pain no greater than 1-2/10.   -TR     LTG 5 Progress Ongoing  -TR     LTG 5 Progress Comments Pt has not yet returned to the gym due to increases soreness over the weekend   -TR        Time Calculation    PT Goal Re-Cert Due Date 04/06/18  -TR       User Key  (r) = Recorded By, (t) = Taken By, (c) = Cosigned By    Initials Name Provider Type    BETINA Bhardwaj PTA Physical Therapy Assistant          Therapy Education  Education Details: Gym progression, decreasing exercises to every other day and days that she is not coming to therapy.   Given: HEP  Program: Modified  How Provided: Verbal, Demonstration  Provided to: Patient  Level of Understanding: Verbalized, Demonstrated              Time Calculation:   Start Time: 0757  Stop Time: 0850  Time Calculation (min): 53 min  PT Non-Billable Time (min): 4 min  Total Timed Code Minutes- PT: 49 minute(s)    Therapy Charges for Today     Code Description Service Date Service Provider Modifiers Qty    50211155350 HC PT MANUAL THERAPY EA 15 MIN 3/14/2018 Emma Chavez  FORREST Bhardwaj GP 1    19169666862 HC PT THER PROC EA 15 MIN 3/14/2018 Emma Bhardwaj PTA GP 2                    Emma Bhardwaj PTA  3/14/2018

## 2018-03-16 ENCOUNTER — APPOINTMENT (OUTPATIENT)
Dept: PHYSICAL THERAPY | Facility: HOSPITAL | Age: 53
End: 2018-03-16
Attending: NEUROLOGICAL SURGERY

## 2018-03-23 ENCOUNTER — HOSPITAL ENCOUNTER (OUTPATIENT)
Dept: PHYSICAL THERAPY | Facility: HOSPITAL | Age: 53
Setting detail: THERAPIES SERIES
Discharge: HOME | End: 2018-03-23
Attending: NEUROLOGICAL SURGERY

## 2018-03-23 DIAGNOSIS — M47.816 LUMBAR SPONDYLOSIS: Primary | ICD-10-CM

## 2018-03-23 PROCEDURE — 97110 THERAPEUTIC EXERCISES: CPT

## 2018-03-23 NOTE — THERAPY DISCHARGE NOTE
Outpatient Physical Therapy Ortho Treatment Note/Discharge Summary  [facility]     Patient Name: Jyoti Pleitez  : 1965  MRN: [id number]  Today's Date: 3/23/2018      Visit Date: 2018    Visit Dx:    ICD-10-CM ICD-9-CM   1. Lumbar spondylosis M47.816 721.3       Patient Active Problem List   Diagnosis   • Neck pain   • Lumbar spondylosis   • Current non-smoker        Past Medical History:   Diagnosis Date   • Back problem         Past Surgical History:   Procedure Laterality Date   • AUGMENTATION MAMMAPLASTY     • BREAST AUGMENTATION     • FOOT SURGERY Bilateral    • HYSTERECTOMY      BIB [facility] for uterine fibroids in  by Dr Acosta   • OOPHORECTOMY     • TONSILLECTOMY                               PT Assessment/Plan     Row Name 18 0800          PT Assessment    Assessment Comments Pt reports that she feels 95% improved since begining therapy. Her modified Oswestry score was 10% today compared to 52% on Eval. Pt reports going to the gym 2 sessions this week without any increase in pain. Pt did report some slight soreness which is expected.  Progress her core and hip exercises today and added these to HEP. Discussed self progression and gym progression on her own. Pt has met all goals at this time and feels confident that she can continue on her own with current HEP.   -TR        PT Plan    PT Plan Comments D/C plan of care with modifed HEP.   -TR       User Key  (r) = Recorded By, (t) = Taken By, (c) = Cosigned By    Initials Name Provider Type    BETINA Bhardwaj PTA Physical Therapy Assistant                    Exercises     Row Name 18 0800 18 0700          Subjective Comments    Subjective Comments Pt reports that she feels a little stiff today due to weather. Pt reports going to the gym with only soreness post session no increase in pain.   -TR Pt reports that she feels a little stiff and that she went to the gym and felt that it went well she is just a little  sore.   -TR        Subjective Pain    Able to rate subjective pain? yes  -TR yes  -TR     Pre-Treatment Pain Level 0  -TR 0  -TR     Post-Treatment Pain Level 0  -TR 0  -TR        Exercise 1    Exercise Name 1 Reviewed HEP and discsused D/C   -TR  --        Exercise 2    Exercise Name 2  B Side Planks   -TR  --     Cueing 2 Verbal  -TR  --     Sets 2 3  -TR  --     Time 2 30 seconds   -TR  --        Exercise 3    Exercise Name 3 Bridges with LLE kicks focusing on neutral pelvis   -TR  --     Cueing 3 Verbal  -TR  --     Sets 3 1  -TR  --     Reps 3 20  -TR  --        Exercise 4    Exercise Name 4 Prone walkouts on 65cm sb   -TR  --     Cueing 4 Demo  -TR  --     Sets 4 1  -TR  --     Reps 4 10  -TR  --     Additional Comments Added to HEP   -TR  --        Exercise 5    Exercise Name 5 Walk out in seated position on 65cm SB   -TR  --     Cueing 5 Demo  -TR  --     Sets 5 1  -TR  --     Reps 5 10  -TR  --     Additional Comments Added to HEP   -TR  --        Exercise 6    Exercise Name 6 4 Way hip B   -TR  --     Cueing 6 Verbal;Demo  -TR  --     Sets 6 1(8 sets total)    -TR  --     Reps 6 10  -TR  --     Additional Comments Added to HEP   -TR  --       User Key  (r) = Recorded By, (t) = Taken By, (c) = Cosigned By    Initials Name Provider Type    BETINA Bhardwaj, PTA Physical Therapy Assistant                               PT OP Goals     Row Name 03/23/18 0800          PT Short Term Goals    STG Date to Achieve 03/11/18  -TR     STG 1 Pt will score 30% or less on the modified Oswestry.   -TR     STG 1 Progress Met  -TR     STG 2 Pt will report completing household chores with pain no greater than 3-4/10.   -TR     STG 2 Progress Met  -TR     STG 3 Pt will be able to bend over to tie shoes with pain no greater than 1-2/10.   -TR     STG 3 Progress Met  -TR        Long Term Goals    LTG Date to Achieve 04/10/18  -TR     LTG 1 Pt will score 10% or less on the modified Oswestry.   -TR     LTG 1 Progress Met   -TR     LTG 1 Progress Comments 10% today   -TR     LTG 2 Pt will demonstrate single leg stance for 30 seconds or greater without comensation at the hip.   -TR     LTG 2 Progress Met  -TR     LTG 3 Pt will demonstrate 4/5 or greater hip abduction.   -TR     LTG 3 Progress Met  -TR     LTG 4 Pt will report completing household chores with pain no greater than 1-2-/10.   -TR     LTG 4 Progress Met  -TR     LTG 5 Pt will report returning to gym routine with pain no greater than 1-2/10.   -TR     LTG 5 Progress Met  -TR     LTG 5 Progress Comments Pt reports returning to the gym with no pain only slight soreness post gym session.   -TR        Time Calculation    PT Goal Re-Cert Due Date 04/06/18  -TR       User Key  (r) = Recorded By, (t) = Taken By, (c) = Cosigned By    Initials Name Provider Type    TR Emma Bhardwaj PTA Physical Therapy Assistant          Therapy Education  Education Details: Ball walkout in prone and seated plus 4 way Hip bilaterally, discuessed self progression and gym progresion   Given: HEP, Posture/body mechanics  Program: Progressed  How Provided: Verbal, Demonstration, Written  Provided to: Patient  Level of Understanding: Verbalized, Demonstrated    Outcome Measure Options: Modifed Owestry  Modified Oswestry  Modified Oswestry Score/Comments: 10%      Time Calculation:   Start Time: 0800  Stop Time: 0847  Time Calculation (min): 47 min  Total Timed Code Minutes- PT: 47 minute(s)    Therapy Charges for Today     Code Description Service Date Service Provider Modifiers Qty    56647440048 HC PT THER PROC EA 15 MIN 3/23/2018 Emma Bhardwaj PTA GP 3          PT G-Codes  Outcome Measure Options: Modifed Owestry     OP PT Discharge Summary  Reason for Discharge: All goals achieved  Outcomes Achieved: Able to achieve all goals within established timeline  Discharge Destination: Home with home program  Discharge Instructions/Additional Comments: We have been working with pt on manual  techniques to decrease her pain and improve her thoracic and lumbar mobility. We have progressed with hipand core strengthening and progressed her HEP. Pt has met all goals at this time and has been progressed to an indepenent HEP. Pt feels confident with HEP so we will D/C at this time.       Emma Bhardwaj, PTA  3/23/2018

## 2018-06-01 ENCOUNTER — OFFICE VISIT (OUTPATIENT)
Dept: OBSTETRICS AND GYNECOLOGY | Facility: CLINIC | Age: 53
End: 2018-06-01

## 2018-06-01 ENCOUNTER — TELEPHONE (OUTPATIENT)
Dept: OBSTETRICS AND GYNECOLOGY | Facility: CLINIC | Age: 53
End: 2018-06-01

## 2018-06-01 VITALS
HEIGHT: 65 IN | DIASTOLIC BLOOD PRESSURE: 60 MMHG | BODY MASS INDEX: 21.33 KG/M2 | SYSTOLIC BLOOD PRESSURE: 92 MMHG | WEIGHT: 128 LBS

## 2018-06-01 DIAGNOSIS — R68.82 LIBIDO, DECREASED: ICD-10-CM

## 2018-06-01 DIAGNOSIS — Z87.891 FORMER SMOKER: ICD-10-CM

## 2018-06-01 DIAGNOSIS — Z78.0 MENOPAUSE: Primary | ICD-10-CM

## 2018-06-01 PROCEDURE — 99213 OFFICE O/P EST LOW 20 MIN: CPT | Performed by: OBSTETRICS & GYNECOLOGY

## 2018-06-01 NOTE — TELEPHONE ENCOUNTER
----- Message from Rossy Georges MD sent at 6/1/2018  8:54 AM CDT -----  Regarding: refill  Please call  pharmacy with refill on her hormones for 90 days.  I assume they will then send a fax to be signed.  Thanks.

## 2018-06-01 NOTE — PROGRESS NOTES
"Subjective   Chief Complaint   Patient presents with   • Menopause     pt here today following up on menopause. pt says that her testosterone cream has been really helping. pt c/o of a few night sweats. pt voices no other concerns.      Jyoti Pleitez is a 53 y.o. year old .  No LMP recorded. Patient has had a hysterectomy.  She presents to be seen because of need for refill on her HRT.   Patient is taking Provera and Estrace, but also takes compounded Testosterone cream that she says improves her mental focus.  She reported at her last visit that it used to help with sex drive, but she did not feel like it still helped her libido anymore.  She has since switched to using  pharmacy and thinks that she can tell a big difference.  Libido and energy level both better now.  Patient wants to continue.    The following portions of the patient's history were reviewed and updated as appropriate:current medications and allergies    Smoking status: Former Smoker                                                              Packs/day: 0.00      Years: 14.00        Types: Cigarettes  Smokeless tobacco: Never Used                        Review of Systems   Constitutional: Negative for activity change and unexpected weight change.   Respiratory: Negative for shortness of breath.    Cardiovascular: Negative for chest pain.   Genitourinary: Negative for vaginal bleeding, vaginal discharge and vaginal pain.   Psychiatric/Behavioral: Positive for decreased concentration (she feels like HRT makes a significant difference).         Objective   BP 92/60   Ht 165.1 cm (65\")   Wt 58.1 kg (128 lb)   BMI 21.30 kg/m²     Physical Exam   Constitutional: She is oriented to person, place, and time. She appears well-developed and well-nourished. No distress.   HENT:   Head: Normocephalic and atraumatic.   Eyes: EOM are normal.   Neck: Normal range of motion. No thyromegaly present.   Pulmonary/Chest: Effort normal.   Abdominal: " Soft. She exhibits no distension. There is no tenderness.   Musculoskeletal: Normal range of motion.   Neurological: She is alert and oriented to person, place, and time.   Skin: Skin is warm and dry.   Psychiatric: She has a normal mood and affect. Her behavior is normal. Judgment normal.   Nursing note and vitals reviewed.      Lab Review   CMP and testosterone from November    Imaging   No data reviewed     Assessment & Plan  Jyoti was seen today for med refill.    Diagnoses and all orders for this visit:    Menopause: She reports hormone replacement therapy does help with mental clarity, but does not feel like testosterone has been helping the past 6 months.  Patient pleased with results of testosterone replacement since her HRT was changed to a different pharmacy after her last visit.  She wants to continue.  Will RTO in 3 months and be fasting that day to have lipid panel checked.     Former smoker    BMI 23.0-23.9, adult    Libido, decreased: improved.    This note was electronically signed.    Rossy Georges MD  June 1, 2018  8:50 AM    Total time spent today with Jyoti  was 20 minutes (level 3).  Greater than 50% of the time was spent coordinating care, answering her questions and counseling regarding pathophysiology of her presenting problem along with plans for any diagnositc work-up and treatment.

## 2018-07-14 ENCOUNTER — APPOINTMENT (OUTPATIENT)
Dept: CT IMAGING | Facility: HOSPITAL | Age: 53
End: 2018-07-14

## 2018-07-14 ENCOUNTER — HOSPITAL ENCOUNTER (EMERGENCY)
Facility: HOSPITAL | Age: 53
Discharge: HOME | End: 2018-07-14
Admitting: NURSE PRACTITIONER

## 2018-07-14 VITALS
HEART RATE: 61 BPM | WEIGHT: 128 LBS | BODY MASS INDEX: 20.57 KG/M2 | RESPIRATION RATE: 15 BRPM | TEMPERATURE: 98.2 F | OXYGEN SATURATION: 100 % | SYSTOLIC BLOOD PRESSURE: 107 MMHG | HEIGHT: 66 IN | DIASTOLIC BLOOD PRESSURE: 72 MMHG

## 2018-07-14 DIAGNOSIS — S16.1XXA STRAIN OF NECK MUSCLE, INITIAL ENCOUNTER: ICD-10-CM

## 2018-07-14 DIAGNOSIS — S06.0X0A CONCUSSION WITHOUT LOSS OF CONSCIOUSNESS, INITIAL ENCOUNTER: Primary | ICD-10-CM

## 2018-07-14 PROCEDURE — 99283 EMERGENCY DEPT VISIT LOW MDM: CPT

## 2018-07-14 PROCEDURE — 72125 CT NECK SPINE W/O DYE: CPT

## 2018-07-14 PROCEDURE — 70450 CT HEAD/BRAIN W/O DYE: CPT

## 2018-07-14 RX ORDER — ONDANSETRON 4 MG/1
4 TABLET, ORALLY DISINTEGRATING ORAL EVERY 6 HOURS PRN
Qty: 10 TABLET | Refills: 0 | Status: SHIPPED | OUTPATIENT
Start: 2018-07-14 | End: 2018-11-21

## 2018-07-14 NOTE — DISCHARGE INSTRUCTIONS
Return to ER if symptoms worsen       Concussion, Adult  A concussion is a brain injury from a direct hit (blow) to the head or body. This injury causes the brain to shake quickly back and forth inside the skull. It is caused by:  · A hit to the head.  · A quick and sudden movement (jolt) of the head or neck.    How fast you will get better from a concussion depends on many things like how bad your concussion was, what part of your brain was hurt, how old you are, and how healthy you were before the concussion. Recovery can take time. It is important to wait to return to activity until a doctor says it is safe and your symptoms are all gone.  Follow these instructions at home:  Activity  · Limit activities that need a lot of thought or concentration. These include:  ? Homework or work for your job.  ? Watching TV.  ? Computer work.  ? Playing memory games and puzzles.  · Rest. Rest helps the brain to heal. Make sure you:  ? Get plenty of sleep at night. Do not stay up late.  ? Go to bed at the same time every day.  ? Rest during the day. Take naps or rest breaks when you feel tired.  · It can be dangerous if you get another concussion before the first one has healed Do not do activities that could cause a second concussion, such as riding a bike or playing sports.  · Ask your doctor when you can return to your normal activities, like driving, riding a bike, or using machinery. Your ability to react may be slower. Do not do these activities if you are dizzy. Your doctor will likely give you a plan for slowly going back to activities.  General instructions  · Take over-the-counter and prescription medicines only as told by your doctor.  · Do not drink alcohol until your doctor says you can.  · If it is harder than usual to remember things, write them down.  · If you are easily distracted, try to do one thing at a time. For example, do not try to watch TV while making dinner.  · Talk with family members or close  friends when you need to make important decisions.  · Watch your symptoms and tell other people to do the same. Other problems (complications) can happen after a concussion. Older adults with a brain injury may have a higher risk of serious problems, such as a blood clot in the brain.  · Tell your teachers, school nurse, school counselor, , , or  about your injury and symptoms. Tell them about what you can or cannot do. They should watch for:  ? More problems with attention or concentration.  ? More trouble remembering or learning new information.  ? More time needed to do tasks or assignments.  ? Being more annoyed (irritable) or having a harder time dealing with stress.  ? Any other symptoms that get worse.  · Keep all follow-up visits as told by your health care provider. This is important.  Prevention  · It is very important that you donot get another brain injury, especially before you have healed. In rare cases, another injury can cause permanent brain damage, brain swelling, or death. You have the most risk if you get another head injury in the first 7-10 days after you were hurt before. To avoid injuries:  ? Wear a seat belt when you ride in a car.  ? Do not drink too much alcohol.  ? Avoid activities that could make you get a second concussion, like contact sports.  ? Wear a helmet when you do activities like:  § Biking.  § Skiing.  § Skateboarding.  § Skating.  ? Make your home safe by:  § Removing things from the floor or stairs that could make you trip.  § Using grab bars in bathrooms and handrails by stairs.  § Placing non-slip mats on floors and in bathtubs.  § Putting more light in dark areas.  Contact a doctor if:  · Your symptoms get worse.  · You have new symptoms.  · You keep having symptoms for more than 2 weeks.  Get help right away if:  · You have bad headaches, or your headaches get worse.  · You have weakness in any part of your body.  · You have loss of  feeling (numbness).  · You feel off balance.  · You keep throwing up (vomiting).  · You feel more sleepy.  · The black center of one eye (pupil) is bigger than the other one.  · You twitch or shake violently (convulse) or have a seizure.  · Your speech is not clear (is slurred).  · You feel more tired, more confused, or more annoyed.  · You do not recognize people or places.  · You have neck pain.  · It is hard to wake you up.  · You have strange behavior changes.  · You pass out (lose consciousness).  Summary  · A concussion is a brain injury from a direct hit (blow) to the head or body.  · This condition is treated with rest and careful watching of symptoms.  · If you keep having symptoms for more than 2 weeks, call your doctor.  This information is not intended to replace advice given to you by your health care provider. Make sure you discuss any questions you have with your health care provider.  Document Released: 12/06/2010 Document Revised: 12/02/2017 Document Reviewed: 12/02/2017  [employer] Interactive Patient Education © 2017 [employer]      Cervical Sprain  A cervical sprain is a stretch or tear in one or more of the tough, cord-like tissues that connect bones (ligaments) in the neck. Cervical sprains can range from mild to severe. Severe cervical sprains can cause the spinal bones (vertebrae) in the neck to be unstable. This can lead to spinal cord damage and can result in serious nervous system problems.  The amount of time that it takes for a cervical sprain to get better depends on the cause and extent of the injury. Most cervical sprains heal in 4-6 weeks.  What are the causes?  Cervical sprains may be caused by an injury (trauma), such as from a motor vehicle accident, a fall, or sudden forward and backward whipping movement of the head and neck (whiplash injury).  Mild cervical sprains may be caused by wear and tear over time, such as from poor posture, sitting in a chair that does not provide  support, or looking up or down for long periods of time.  What increases the risk?  The following factors may make you more likely to develop this condition:  · Participating in activities that have a high risk of trauma to the neck. These include contact sports, auto racing, gymnastics, and diving.  · Taking risks when driving or riding in a motor vehicle, such as speeding.  · Having osteoarthritis of the spine.  · Having poor strength and flexibility of the neck.  · A previous neck injury.  · Having poor posture.  · Spending a lot of time in certain positions that put stress on the neck, such as sitting at a computer for long periods of time.    What are the signs or symptoms?  Symptoms of this condition include:  · Pain, soreness, stiffness, tenderness, swelling, or a burning sensation in the front, back, or sides of the neck.  · Sudden tightening of neck muscles that you cannot control (muscle spasms).  · Pain in the shoulders or upper back.  · Limited ability to move the neck.  · Headache.  · Dizziness.  · Nausea.  · Vomiting.  · Weakness, numbness, or tingling in a hand or an arm.    Symptoms may develop right away after injury, or they may develop over a few days. In some cases, symptoms may go away with treatment and return (recur) over time.  How is this diagnosed?  This condition may be diagnosed based on:  · Your medical history.  · Your symptoms.  · Any recent injuries or known neck problems that you have, such as arthritis in the neck.  · A physical exam.  · Imaging tests, such as:  ? X-rays.  ? MRI.  ? CT scan.    How is this treated?  This condition is treated by resting and icing the injured area and doing physical therapy exercises. Depending on the severity of your condition, treatment may also include:  · Keeping your neck in place (immobilized) for periods of time. This may be done using:  ? A cervical collar. This supports your chin and the back of your head.  ? A cervical traction device. This  is a sling that holds up your head. This removes weight and pressure from your neck, and it may help to relieve pain.  · Medicines that help to relieve pain and inflammation.  · Medicines that help to relax your muscles (muscle relaxants).  · Surgery. This is rare.    Follow these instructions at home:  If you have a cervical collar:  · Wear it as told by your health care provider. Do not remove the collar unless instructed by your health care provider.  · Ask your health care provider before you make any adjustments to your collar.  · If you have long hair, keep it outside of the collar.  · Ask your health care provider if you can remove the collar for cleaning and bathing. If you are allowed to remove the collar for cleaning or bathing:  ? Follow instructions from your health care provider about how to remove the collar safely.  ? Clean the collar by wiping it with mild soap and water and drying it completely.  ? If your collar has removable pads, remove them every 1-2 days and wash them by hand with soap and water. Let them air-dry completely before you put them back in the collar.  ? Check your skin under the collar for irritation or sores. If you see any, tell your health care provider.  Managing pain, stiffness, and swelling  · If directed, use a cervical traction device as told by your health care provider.  · If directed, apply heat to the affected area before you do your physical therapy or as often as told by your health care provider. Use the heat source that your health care provider recommends, such as a moist heat pack or a heating pad.  ? Place a towel between your skin and the heat source.  ? Leave the heat on for 20-30 minutes.  ? Remove the heat if your skin turns bright red. This is especially important if you are unable to feel pain, heat, or cold. You may have a greater risk of getting burned.  · If directed, put ice on the affected area:  ? Put ice in a plastic bag.  ? Place a towel between  your skin and the bag.  ? Leave the ice on for 20 minutes, 2-3 times a day.  Activity  · Do not drive while wearing a cervical collar. If you do not have a cervical collar, ask your health care provider if it is safe to drive while your neck heals.  · Do not drive or use heavy machinery while taking prescription pain medicine or muscle relaxants, unless your health care provider approves.  · Do not lift anything that is heavier than 10 lb (4.5 kg) until your health care provider tells you that it is safe.  · Rest as directed by your health care provider. Avoid positions and activities that make your symptoms worse. Ask your health care provider what activities are safe for you.  · If physical therapy was prescribed, do exercises as told by your health care provider or physical therapist.  General instructions  · Take over-the-counter and prescription medicines only as told by your health care provider.  · Do not use any products that contain nicotine or tobacco, such as cigarettes and e-cigarettes. These can delay healing. If you need help quitting, ask your health care provider.  · Keep all follow-up visits as told by your health care provider or physical therapist. This is important.  How is this prevented?  To prevent a cervical sprain from happening again:  · Use and maintain good posture. Make any needed adjustments to your workstation to help you use good posture.  · Exercise regularly as directed by your health care provider or physical therapist.  · Avoid risky activities that may cause a cervical sprain.    Contact a health care provider if:  · You have symptoms that get worse or do not get better after 2 weeks of treatment.  · You have pain that gets worse or does not get better with medicine.  · You develop new, unexplained symptoms.  · You have sores or irritated skin on your neck from wearing your cervical collar.  Get help right away if:  · You have severe pain.  · You develop numbness, tingling, or  weakness in any part of your body.  · You cannot move a part of your body (you have paralysis).  · You have neck pain along with:  ? Severe dizziness.  ? Headache.  Summary  · A cervical sprain is a stretch or tear in one or more of the tough, cord-like tissues that connect bones (ligaments) in the neck.  · Cervical sprains may be caused by an injury (trauma), such as from a motor vehicle accident, a fall, or sudden forward and backward whipping movement of the head and neck (whiplash injury).  · Symptoms may develop right away after injury, or they may develop over a few days.  · This condition is treated by resting and icing the injured area and doing physical therapy exercises.  This information is not intended to replace advice given to you by your health care provider. Make sure you discuss any questions you have with your health care provider.  Document Released: 10/14/2008 Document Revised: 08/16/2017 Document Reviewed: 08/16/2017  [employer] Interactive Patient Education © 2018 [employer]

## 2018-07-14 NOTE — ED PROVIDER NOTES
Subjective   Patient is a 53-year-old white female presents with headache, nausea, dizziness since striking her head on a shelf 3 days ago.  She denies loss consciousness.  She states she has had a headache intermittently since.  She is also complaining of some posterior neck pain as well. She denies loc. She states that she has not felt well since.         History provided by:  Patient and spouse   used: No        Review of Systems   Constitutional: Negative.    HENT:        Patient is a 53-year-old white female presents with headache, nausea, dizziness since striking her head on a shelf 3 days ago.  She denies loss consciousness.  She states she has had a headache intermittently since.  She is also complaining of some posterior neck pain as well. She denies loc. She states that she has not felt well since.      Eyes: Negative.    Respiratory: Negative.    Cardiovascular: Negative.    Gastrointestinal: Negative.    Endocrine: Negative.    Genitourinary: Negative.    Musculoskeletal: Negative.    Skin: Negative.    Allergic/Immunologic: Negative.    Neurological: Negative.    Hematological: Negative.    Psychiatric/Behavioral: Negative.    All other systems reviewed and are negative.      Past Medical History:   Diagnosis Date   • Back problem        Allergies   Allergen Reactions   • Sulfa Antibiotics Hives and Swelling       Past Surgical History:   Procedure Laterality Date   • AUGMENTATION MAMMAPLASTY  1997   • BREAST AUGMENTATION     • FOOT SURGERY Bilateral    • HYSTERECTOMY      BIB [facility] for uterine fibroids in 2008 by Dr Acosta   • OOPHORECTOMY     • TONSILLECTOMY         Family History   Problem Relation Age of Onset   • Lung cancer Mother    • Coronary artery disease Father    • Hypertension Father    • Prostate cancer Father    • Coronary artery disease Brother    • Coronary artery disease Maternal Grandfather    • Colon cancer Cousin    • Breast cancer Maternal Aunt        Social  "History     Social History   • Marital status:      Social History Main Topics   • Smoking status: Former Smoker     Years: 14.00     Types: Cigarettes   • Smokeless tobacco: Never Used   • Alcohol use 0.6 oz/week     1 Glasses of wine per week   • Drug use: No   • Sexual activity: Yes     Partners: Male     Birth control/ protection: Surgical     Other Topics Concern   • Not on file       Prior to Admission medications    Medication Sig Start Date End Date Taking? Authorizing Provider   aspirin 81 MG EC tablet Take 81 mg by mouth Daily.    Historical Provider, MD   estradiol (ESTRACE) 2 MG tablet Take 1 tablet by mouth Daily. 8/1/17   Rossy Georges MD   medroxyPROGESTERone (PROVERA) 2.5 MG tablet Take 1 tablet by mouth Daily. 8/1/17   Rossy Georges MD   meloxicam (MOBIC) 7.5 MG tablet Take 7.5 mg by mouth Daily.    Historical Provider, MD   metaxalone (SKELAXIN) 800 MG tablet Take 1 tablet by mouth 3 (Three) Times a Day As Needed for Muscle Spasms. 12/21/17   Jasmaine V Marilee, APRN   naproxen (NAPROSYN) 500 MG tablet Take 1 tablet by mouth 2 (Two) Times a Day With Meals. 12/21/17   Jasmaine V Marilee, APRN   NON FORMULARY 1 application every night at bedtime. 1% testosterone cream    Historical Provider, MD   tiZANidine (ZANAFLEX) 2 MG tablet Take 2 mg by mouth At Night As Needed for Muscle Spasms.    Nurse [facility], RN       /72   Pulse 61   Temp 98.2 °F (36.8 °C)   Resp 15   Ht 167.6 cm (66\")   Wt 58.1 kg (128 lb)   SpO2 100%   BMI 20.66 kg/m²     Objective   Physical Exam   Constitutional: She is oriented to person, place, and time. She appears well-developed and well-nourished.   HENT:   Head: Normocephalic and atraumatic.   Eyes: Conjunctivae and EOM are normal. Pupils are equal, round, and reactive to light.   Neck: Normal range of motion. Neck supple. No tracheal deviation present. No thyromegaly present.   Mild tenderness noted on palpation of posterior cervical spine. No " stepoff or laxity noted. No soft tissue swelling or ecchymosis noted.    Cardiovascular: Normal rate, regular rhythm, normal heart sounds and intact distal pulses.    Pulmonary/Chest: Effort normal and breath sounds normal. No respiratory distress. She has no wheezes. She has no rales. She exhibits no tenderness.   Abdominal: Soft. Bowel sounds are normal.   Musculoskeletal: Normal range of motion.   Neurological: She is alert and oriented to person, place, and time. She has normal reflexes. No cranial nerve deficit.   Skin: Skin is warm and dry.   Psychiatric: She has a normal mood and affect. Her behavior is normal. Judgment and thought content normal.   Nursing note and vitals reviewed.      Procedures         Lab Results (last 24 hours)     ** No results found for the last 24 hours. **          CT Cervical Spine Without Contrast   Final Result   1. No evidence of acute osseous injury in the cervical spine.   2. Reversal of the normal cervical lordosis, with degenerative disc   disease at C6-C7 and areas of spondylolisthesis as above.       This report was finalized on 07/14/2018 12:43 by Dr. Edmar Reed MD.      CT Head Without Contrast   ED Interpretation   1. No acute intracranial findings.       This report was finalized on 07/14/2018 12:37 by Dr. Edmar Reed MD.      Final Result   1. No acute intracranial findings.       This report was finalized on 07/14/2018 12:37 by Dr. Edmar Reed MD.          ED Course  ED Course as of Jul 15 0751   Sat Jul 14, 2018   1308 Reviewed results with pt and pt spouse. Will prescribe pt antiemetics and have follow up with dr crowe this week. Advised to return before if symptoms worsen. Pt will be discharged home soon in stable condition. Advised to return if symptoms worsen   [CW]      ED Course User Index  [CW] USAMA Saucedo          MDM  Number of Diagnoses or Management Options  Concussion without loss of consciousness, initial encounter:  minor  Strain of neck muscle, initial encounter: minor     Amount and/or Complexity of Data Reviewed  Tests in the radiology section of CPT®: ordered and reviewed  Independent visualization of images, tracings, or specimens: yes    Patient Progress  Patient progress: stable      Final diagnoses:   Concussion without loss of consciousness, initial encounter   Strain of neck muscle, initial encounter          Erlinda Oshea, APRN  07/15/18 0754

## 2018-07-23 NOTE — ED NOTES
"ED Call Back Questions    1. How are you doing since leaving the Emergency Department?    Still have headaches but better than befeore  2. Do you have any questions about your discharge instructions? No     3. Have you filled your new prescriptions yet? Yes   a. Do you have any questions about those medications? No     4. Were you able to make a follow-up appointment with the physician? No     5. Do you have a primary care physician? Yes   a. If No, would you like for me to set you up with one? N/A  i. If Yes, “I will have our ED  give you a call right back at this number to work with you on the best time for an appointment.”    6. We are always looking to get better at what we do. Do you have any suggestions for what we can do to be even better? N/A  a. If Yes, \"Thank you for sharing your concerns. I apologize. I will follow up with our manager and patient . Would you like someone to call you back?\" N/A    7. Is there anything else I can do for you? N/A       Ignacio Quesada  07/23/18 0915    "

## 2018-08-20 DIAGNOSIS — Z78.0 MENOPAUSE: ICD-10-CM

## 2018-08-21 RX ORDER — ESTRADIOL 2 MG/1
TABLET ORAL
Qty: 30 TABLET | Refills: 0 | Status: SHIPPED | OUTPATIENT
Start: 2018-08-21 | End: 2018-09-26 | Stop reason: SDUPTHER

## 2018-08-21 RX ORDER — MEDROXYPROGESTERONE ACETATE 2.5 MG/1
TABLET ORAL
Qty: 30 TABLET | Refills: 0 | Status: SHIPPED | OUTPATIENT
Start: 2018-08-21 | End: 2018-09-26 | Stop reason: SDUPTHER

## 2018-09-06 ENCOUNTER — OFFICE VISIT (OUTPATIENT)
Dept: OBSTETRICS AND GYNECOLOGY | Facility: CLINIC | Age: 53
End: 2018-09-06

## 2018-09-06 VITALS
BODY MASS INDEX: 21.16 KG/M2 | SYSTOLIC BLOOD PRESSURE: 96 MMHG | DIASTOLIC BLOOD PRESSURE: 60 MMHG | HEIGHT: 65 IN | WEIGHT: 127 LBS

## 2018-09-06 DIAGNOSIS — R53.83 FATIGUE, UNSPECIFIED TYPE: ICD-10-CM

## 2018-09-06 DIAGNOSIS — R68.82 LIBIDO, DECREASED: ICD-10-CM

## 2018-09-06 DIAGNOSIS — Z78.0 MENOPAUSE: Primary | ICD-10-CM

## 2018-09-06 PROCEDURE — 99213 OFFICE O/P EST LOW 20 MIN: CPT | Performed by: OBSTETRICS & GYNECOLOGY

## 2018-09-06 NOTE — PROGRESS NOTES
"Subjective   Chief Complaint   Patient presents with   • med check     pt here today for refill on testosterone cream.  pt voices no other concerns.      Jyoti Pleitez is a 53 y.o. year old .  No LMP recorded. Patient has had a hysterectomy.  She presents to be seen for follow-up of HRT/testosterone replacement.  Patient still feels like hormones are beneficial.  Hot flashes and night sweats are well controlled.  Libido still improved with testosterone.  She does still report decreased energy, but knows that will improve if she gets back into habit of exercising regularly.     The following portions of the patient's history were reviewed and updated as appropriate:current medications and allergies    Smoking status: Former Smoker                                                              Packs/day: 0.00      Years: 14.00        Types: Cigarettes  Smokeless tobacco: Never Used                        Review of Systems   Constitutional: Positive for fatigue. Negative for activity change and unexpected weight change.   Respiratory: Negative for shortness of breath.    Cardiovascular: Negative for chest pain.   Genitourinary: Negative for vaginal bleeding, vaginal discharge and vaginal pain.   Psychiatric/Behavioral: Positive for decreased concentration (she feels like HRT makes a significant difference).         Objective   BP 96/60   Ht 165.1 cm (65\")   Wt 57.6 kg (127 lb)   BMI 21.13 kg/m²     Physical Exam   Constitutional: She is oriented to person, place, and time. She appears well-developed and well-nourished. No distress.   HENT:   Head: Normocephalic and atraumatic.   Eyes: EOM are normal.   Neck: Normal range of motion.   Pulmonary/Chest: Effort normal.   Abdominal: Soft. There is no tenderness.   Musculoskeletal: Normal range of motion.   Neurological: She is alert and oriented to person, place, and time.   Skin: Skin is warm and dry.   Psychiatric: She has a normal mood and affect. Her " behavior is normal. Judgment normal.   Nursing note and vitals reviewed.      Lab Review   No data reviewed    Imaging   No data reviewed     Assessment & Plan    Jyoti was seen today for med check.    Diagnoses and all orders for this visit:    Menopause: Patient wants to continue HRT.  Will return for labs this afternoon since she forgot and drank coffee with creamer this morning.  -     Testosterone  -     Hemoglobin A1c  -     Comprehensive Metabolic Panel & Lipid Panel    Libido, decreased: Will send testosterone level to  pharmacy tomorrow.  She also wants to try oxytocin nasal spray.  -     Testosterone    Fatigue, unspecified type  -     TSH      This note was electronically signed.    Rossy Georges MD  September 6, 2018  8:17 AM    Total time spent today with Jyoti  was 20 minutes (level 3).  Greater than 50% of the time was spent coordinating care, answering her questions and counseling regarding pathophysiology of her presenting problem along with plans for any diagnositc work-up and treatment.

## 2018-09-07 LAB
ALBUMIN SERPL-MCNC: 4.1 G/DL (ref 3.5–5)
ALBUMIN/GLOB SERPL: 1.6 G/DL (ref 1.1–2.5)
ALP SERPL-CCNC: 64 U/L (ref 24–120)
ALT SERPL-CCNC: 39 U/L (ref 0–54)
AST SERPL-CCNC: 36 U/L (ref 7–45)
BILIRUB SERPL-MCNC: 0.7 MG/DL (ref 0.1–1)
BUN SERPL-MCNC: 16 MG/DL (ref 5–21)
BUN/CREAT SERPL: 23.2 (ref 7–25)
CALCIUM SERPL-MCNC: 9.2 MG/DL (ref 8.4–10.4)
CHLORIDE SERPL-SCNC: 102 MMOL/L (ref 98–110)
CHOLEST SERPL-MCNC: 200 MG/DL (ref 130–200)
CO2 SERPL-SCNC: 26 MMOL/L (ref 24–31)
CREAT SERPL-MCNC: 0.69 MG/DL (ref 0.5–1.4)
GLOBULIN SER CALC-MCNC: 2.6 GM/DL
GLUCOSE SERPL-MCNC: 75 MG/DL (ref 70–100)
HBA1C MFR BLD: 5.1 %
HDLC SERPL-MCNC: 78 MG/DL
LDLC SERPL CALC-MCNC: 100 MG/DL (ref 0–99)
LDLC/HDLC SERPL: 1.28 {RATIO}
POTASSIUM SERPL-SCNC: 4.2 MMOL/L (ref 3.5–5.3)
PROT SERPL-MCNC: 6.7 G/DL (ref 6.3–8.7)
SODIUM SERPL-SCNC: 138 MMOL/L (ref 135–145)
TESTOST SERPL-MCNC: 98 NG/DL (ref 3–41)
TRIGL SERPL-MCNC: 109 MG/DL (ref 0–149)
TSH SERPL DL<=0.005 MIU/L-ACNC: 2.72 MIU/ML (ref 0.47–4.68)
VLDLC SERPL CALC-MCNC: 21.8 MG/DL

## 2018-09-07 NOTE — PROGRESS NOTES
Please let patient know that cholesterol and CMP were normal.  Her testosterone is a little too high, so  will have to adjust her compounded HRT.    Then send her testosterone level to  pharmacy, and also call in the oxytocin nasal spray for her.  We discussed it yesterday and she want to try it.  Thanks.

## 2018-09-26 DIAGNOSIS — Z78.0 MENOPAUSE: ICD-10-CM

## 2018-09-26 RX ORDER — ESTRADIOL 2 MG/1
TABLET ORAL
Qty: 30 TABLET | Refills: 2 | Status: SHIPPED | OUTPATIENT
Start: 2018-09-26 | End: 2019-01-07 | Stop reason: SDUPTHER

## 2018-09-26 RX ORDER — MEDROXYPROGESTERONE ACETATE 2.5 MG/1
TABLET ORAL
Qty: 30 TABLET | Refills: 2 | Status: SHIPPED | OUTPATIENT
Start: 2018-09-26 | End: 2019-03-08 | Stop reason: SDUPTHER

## 2018-11-21 ENCOUNTER — OFFICE VISIT (OUTPATIENT)
Dept: OBSTETRICS AND GYNECOLOGY | Facility: CLINIC | Age: 53
End: 2018-11-21

## 2018-11-21 VITALS
BODY MASS INDEX: 21.66 KG/M2 | WEIGHT: 130 LBS | HEIGHT: 65 IN | SYSTOLIC BLOOD PRESSURE: 102 MMHG | DIASTOLIC BLOOD PRESSURE: 60 MMHG

## 2018-11-21 DIAGNOSIS — Z87.891 FORMER SMOKER: ICD-10-CM

## 2018-11-21 DIAGNOSIS — R68.82 LIBIDO, DECREASED: ICD-10-CM

## 2018-11-21 DIAGNOSIS — Z78.0 MENOPAUSE: Primary | ICD-10-CM

## 2018-11-21 PROCEDURE — 99213 OFFICE O/P EST LOW 20 MIN: CPT | Performed by: OBSTETRICS & GYNECOLOGY

## 2018-11-22 LAB — TESTOST SERPL-MCNC: 6 NG/DL (ref 3–41)

## 2019-01-07 DIAGNOSIS — Z78.0 MENOPAUSE: ICD-10-CM

## 2019-01-07 RX ORDER — ESTRADIOL 2 MG/1
TABLET ORAL
Qty: 30 TABLET | Refills: 0 | Status: SHIPPED | OUTPATIENT
Start: 2019-01-07 | End: 2019-02-01 | Stop reason: SDUPTHER

## 2019-02-01 DIAGNOSIS — Z78.0 MENOPAUSE: ICD-10-CM

## 2019-02-01 RX ORDER — ESTRADIOL 2 MG/1
TABLET ORAL
Qty: 30 TABLET | Refills: 0 | Status: SHIPPED | OUTPATIENT
Start: 2019-02-01 | End: 2019-03-08 | Stop reason: SDUPTHER

## 2019-02-15 ENCOUNTER — OFFICE VISIT (OUTPATIENT)
Dept: OBSTETRICS AND GYNECOLOGY | Facility: CLINIC | Age: 54
End: 2019-02-15

## 2019-02-15 VITALS
BODY MASS INDEX: 20.99 KG/M2 | WEIGHT: 126 LBS | HEIGHT: 65 IN | DIASTOLIC BLOOD PRESSURE: 60 MMHG | SYSTOLIC BLOOD PRESSURE: 104 MMHG

## 2019-02-15 DIAGNOSIS — R53.83 FATIGUE, UNSPECIFIED TYPE: ICD-10-CM

## 2019-02-15 DIAGNOSIS — R68.82 LIBIDO, DECREASED: Primary | ICD-10-CM

## 2019-02-15 PROCEDURE — 99213 OFFICE O/P EST LOW 20 MIN: CPT | Performed by: OBSTETRICS & GYNECOLOGY

## 2019-02-15 NOTE — PROGRESS NOTES
"Subjective   Chief Complaint   Patient presents with   • Med Refill     pt here today for testosterone refill. pt voices no concerns.      Jyoti Pleitez is a 53 y.o. year old .  No LMP recorded. Patient has had a hysterectomy.  She presents to be seen for follow-up of testosterone therapy.  Her level had gotten too high, and so her prescription was adjusted 3 months ago at her last visit.  The patient feels like her libido and fatigue are both \"better\" now, but still tired at night and finding it hard to motivate herself to exercise.  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  Oxytocin nasal spray is helpful for libido and she wants to continue that.     The following portions of the patient's history were reviewed and updated as appropriate:current medications and allergies    Social History    Tobacco Use      Smoking status: Former Smoker        Years: 14.00        Types: Cigarettes      Smokeless tobacco: Never Used    Review of Systems   Constitutional: Negative for activity change, fatigue (better energy than she has had in a while) and unexpected weight change.   Respiratory: Negative for shortness of breath.    Cardiovascular: Negative for chest pain.   Gastrointestinal: Negative for abdominal pain.   Genitourinary: Negative for dyspareunia, pelvic pain and vaginal bleeding.         Objective   /60   Ht 165.1 cm (65\")   Wt 57.2 kg (126 lb)   BMI 20.97 kg/m²     Physical Exam   Constitutional: She is oriented to person, place, and time. She appears well-developed and well-nourished. No distress.   HENT:   Head: Normocephalic and atraumatic.   Eyes: EOM are normal.   Neck: Normal range of motion.   Pulmonary/Chest: Effort normal.   Abdominal: Soft. She exhibits no distension. There is no tenderness.   Musculoskeletal: Normal range of motion.   Neurological: She is alert and oriented to person, place, and time.   Skin: Skin is warm and dry. "   Psychiatric: She has a normal mood and affect. Her behavior is normal. Judgment normal.   Nursing note and vitals reviewed.      Lab Review   No data reviewed    Imaging   No data reviewed     Assessment & Plan    Jyoti was seen today for med refill.    Diagnoses and all orders for this visit:    Libido, decreased: Checking level again today since her dosage was adjusted at her last visit.  Will also continue oxytocin nasal spray.  -     Testosterone    Fatigue, unspecified type: Seems some better on HRT, but still struggling because she now has a sedentary job      This note was electronically signed.    Rossy Georges MD  February 15, 2019  8:12 AM    Total time spent today with Jyoti  was 20 minutes (level 3).  Greater than 50% of the time was spent coordinating care, answering her questions and counseling regarding pathophysiology of her presenting problem along with plans for any diagnositc work-up and treatment.

## 2019-02-16 LAB — TESTOST SERPL-MCNC: 17 NG/DL (ref 3–41)

## 2019-02-18 NOTE — PROGRESS NOTES
Please send to  pharmacy, and also call in refills on patient's oxytocin nasal spray.  She does find that helpful.  Malkax

## 2019-03-08 DIAGNOSIS — Z78.0 MENOPAUSE: ICD-10-CM

## 2019-03-08 RX ORDER — MEDROXYPROGESTERONE ACETATE 2.5 MG/1
TABLET ORAL
Qty: 30 TABLET | Refills: 1 | Status: SHIPPED | OUTPATIENT
Start: 2019-03-08 | End: 2019-06-06 | Stop reason: SDUPTHER

## 2019-03-08 RX ORDER — ESTRADIOL 2 MG/1
TABLET ORAL
Qty: 30 TABLET | Refills: 1 | Status: SHIPPED | OUTPATIENT
Start: 2019-03-08 | End: 2019-05-17 | Stop reason: SDUPTHER

## 2019-05-10 ENCOUNTER — OFFICE VISIT (OUTPATIENT)
Dept: OBSTETRICS AND GYNECOLOGY | Facility: CLINIC | Age: 54
End: 2019-05-10

## 2019-05-10 VITALS
DIASTOLIC BLOOD PRESSURE: 62 MMHG | BODY MASS INDEX: 21.16 KG/M2 | SYSTOLIC BLOOD PRESSURE: 106 MMHG | HEIGHT: 65 IN | WEIGHT: 127 LBS

## 2019-05-10 DIAGNOSIS — R53.83 FATIGUE, UNSPECIFIED TYPE: ICD-10-CM

## 2019-05-10 DIAGNOSIS — Z78.0 MENOPAUSE: Primary | ICD-10-CM

## 2019-05-10 DIAGNOSIS — R68.82 LIBIDO, DECREASED: ICD-10-CM

## 2019-05-10 PROCEDURE — 99213 OFFICE O/P EST LOW 20 MIN: CPT | Performed by: OBSTETRICS & GYNECOLOGY

## 2019-05-10 NOTE — PROGRESS NOTES
"Subjective   Chief Complaint   Patient presents with   • Med Refill     pt here today for medication refill on hormones. pt voices no concerns.      Jyoti Pleitez is a 54 y.o. year old .  No LMP recorded. Patient has had a hysterectomy.  She presents to be seen for follow-up of testosterone therapy.  Her level had gotten too high late last year, and so her prescription was adjusted 6 months ago.  The patient feels like her libido and fatigue are both \"better\" now, but still tired at night and finding it hard to motivate herself to exercise.  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  Oxytocin nasal spray is helpful for libido and she wants to continue that - she is not great about using it every day, but does still use it.     The following portions of the patient's history were reviewed and updated as appropriate:current medications and allergies    Social History    Tobacco Use      Smoking status: Former Smoker        Years: 14.00        Types: Cigarettes      Smokeless tobacco: Never Used    Review of Systems   Constitutional: Negative for activity change, fatigue (better energy than she has had in a while) and unexpected weight change.   Respiratory: Negative for shortness of breath.    Cardiovascular: Negative for chest pain.   Gastrointestinal: Negative for abdominal pain.   Genitourinary: Negative for dyspareunia, pelvic pain and vaginal bleeding.         Objective   /62   Ht 165.1 cm (65\")   Wt 57.6 kg (127 lb)   BMI 21.13 kg/m²     Physical Exam   Constitutional: She is oriented to person, place, and time. She appears well-developed and well-nourished. No distress.   HENT:   Head: Normocephalic and atraumatic.   Eyes: EOM are normal.   Neck: Normal range of motion.   Pulmonary/Chest: Effort normal.   Abdominal: Soft. She exhibits no distension. There is no tenderness.   Musculoskeletal: Normal range of motion.   Neurological: She is alert " and oriented to person, place, and time.   Skin: Skin is warm and dry.   Psychiatric: She has a normal mood and affect. Her behavior is normal. Judgment normal.   Nursing note and vitals reviewed.      Lab Review   No data reviewed    Imaging   No data reviewed     Assessment & Plan    Jyoti was seen today for med refill.    Diagnoses and all orders for this visit:    Libido, decreased: Not checking level today since her dosage was adjusted 6 months ago and level had increased.  Will also continue oxytocin nasal spray.    Fatigue, unspecified type: Seems some better on HRT, but still struggling because she now has a sedentary job.  Again discussed need for exercise    This note was electronically signed.    Rossy Georges MD  May 10, 2019  8:18 AM    Total time spent today with Jyoti  was 20 minutes (level 3).  Greater than 50% of the time was spent coordinating care, answering her questions and counseling regarding pathophysiology of her presenting problem along with plans for any diagnositc work-up and treatment.

## 2019-05-17 DIAGNOSIS — Z78.0 MENOPAUSE: ICD-10-CM

## 2019-05-17 RX ORDER — ESTRADIOL 2 MG/1
TABLET ORAL
Qty: 30 TABLET | Refills: 2 | Status: SHIPPED | OUTPATIENT
Start: 2019-05-17 | End: 2019-08-19 | Stop reason: SDUPTHER

## 2019-06-06 DIAGNOSIS — Z78.0 MENOPAUSE: ICD-10-CM

## 2019-06-06 RX ORDER — MEDROXYPROGESTERONE ACETATE 2.5 MG/1
TABLET ORAL
Qty: 30 TABLET | Refills: 2 | Status: SHIPPED | OUTPATIENT
Start: 2019-06-06 | End: 2019-11-01 | Stop reason: SDUPTHER

## 2019-08-01 ENCOUNTER — TELEPHONE (OUTPATIENT)
Dept: NEUROSURGERY | Facility: CLINIC | Age: 54
End: 2019-08-01

## 2019-08-01 NOTE — TELEPHONE ENCOUNTER
Left patient a message to call me back to confirm her appt with Dr Mcneill on 9/3/19      roge lozano CMA

## 2019-08-19 DIAGNOSIS — Z78.0 MENOPAUSE: ICD-10-CM

## 2019-08-20 RX ORDER — ESTRADIOL 2 MG/1
TABLET ORAL
Qty: 30 TABLET | Refills: 0 | Status: SHIPPED | OUTPATIENT
Start: 2019-08-20 | End: 2019-08-27 | Stop reason: SDUPTHER

## 2019-08-21 ENCOUNTER — OFFICE VISIT (OUTPATIENT)
Dept: OBSTETRICS AND GYNECOLOGY | Facility: CLINIC | Age: 54
End: 2019-08-21

## 2019-08-21 VITALS
WEIGHT: 125 LBS | SYSTOLIC BLOOD PRESSURE: 92 MMHG | DIASTOLIC BLOOD PRESSURE: 58 MMHG | HEIGHT: 65 IN | BODY MASS INDEX: 20.83 KG/M2

## 2019-08-21 DIAGNOSIS — R53.83 FATIGUE, UNSPECIFIED TYPE: ICD-10-CM

## 2019-08-21 DIAGNOSIS — Z78.0 MENOPAUSE: ICD-10-CM

## 2019-08-21 DIAGNOSIS — R68.82 LIBIDO, DECREASED: Primary | ICD-10-CM

## 2019-08-21 PROCEDURE — 99214 OFFICE O/P EST MOD 30 MIN: CPT | Performed by: OBSTETRICS & GYNECOLOGY

## 2019-08-21 NOTE — PROGRESS NOTES
"Subjective   Chief Complaint   Patient presents with   • Med Refill     pt here today for refill on hormones. pt voices no concerns.      Jyoti Pleitez is a 54 y.o. year old .  No LMP recorded. Patient has had a hysterectomy.  She presents to be seen for follow-up of testosterone therapy.  Her level had gotten too high late last year, and so her prescription was adjusted 6 months ago.  The patient feels like her libido and fatigue are both \"better\" now.  Patient feels like her level is good right now.  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  Oxytocin nasal spray is very helpful for libido and she wants to continue that too.  The following portions of the patient's history were reviewed and updated as appropriate:current medications and allergies    Social History    Tobacco Use      Smoking status: Former Smoker        Years: 14.00        Types: Cigarettes      Smokeless tobacco: Never Used    Review of Systems   Constitutional: Negative for activity change, fatigue (better energy than she has had in a while) and unexpected weight change.   Respiratory: Negative for shortness of breath.    Cardiovascular: Negative for chest pain.   Gastrointestinal: Negative for abdominal pain.   Genitourinary: Negative for dyspareunia, pelvic pain and vaginal bleeding.         Objective   BP 92/58   Ht 165.1 cm (65\")   Wt 56.7 kg (125 lb)   BMI 20.80 kg/m²     Physical Exam   Constitutional: She is oriented to person, place, and time. She appears well-developed and well-nourished. No distress.   HENT:   Head: Normocephalic and atraumatic.   Eyes: EOM are normal.   Neck: Normal range of motion.   Pulmonary/Chest: Effort normal.   Abdominal: Soft. She exhibits no distension. There is no tenderness.   Musculoskeletal: Normal range of motion.   Neurological: She is alert and oriented to person, place, and time.   Skin: Skin is warm and dry.   Psychiatric: She has a normal " mood and affect. Her behavior is normal. Judgment normal.   Nursing note and vitals reviewed.      Lab Review   No data reviewed    Imaging   No data reviewed     Assessment & Plan    Jyoti was seen today for med refill.    Diagnoses and all orders for this visit:    Libido, decreased: Not checking level today since her dosage was adjusted 6 months ago and level had increased.  Will also continue oxytocin nasal spray.    Fatigue, unspecified type: Seems some better on HRT, but still struggling because she now has a sedentary job.  Again discussed need for exercise - we have discussed multiple times.  Patient with concerns about bone density; I reminded her that weight-bearing exercise would help prevent bone loss.    This note was electronically signed.    Rossy Georges MD  August 21, 2019  10:20 AM    Total time spent today with Jyoti  was 20 minutes (level 3).  Greater than 50% of the time was spent coordinating care, answering her questions and counseling regarding pathophysiology of her presenting problem along with plans for any diagnositc work-up and treatment.

## 2019-08-22 ENCOUNTER — TELEPHONE (OUTPATIENT)
Dept: OBSTETRICS AND GYNECOLOGY | Facility: CLINIC | Age: 54
End: 2019-08-22

## 2019-08-22 NOTE — TELEPHONE ENCOUNTER
----- Message from Rossy Georges MD sent at 8/21/2019 10:25 AM CDT -----  Please call in refills to [facility] on both testosterone replacement and oxytocin nasal spray, both for 90 days.  Thanks

## 2019-08-27 DIAGNOSIS — Z78.0 MENOPAUSE: ICD-10-CM

## 2019-08-28 RX ORDER — ESTRADIOL 2 MG/1
TABLET ORAL
Qty: 30 TABLET | Refills: 2 | Status: SHIPPED | OUTPATIENT
Start: 2019-08-28 | End: 2020-01-02 | Stop reason: SDUPTHER

## 2019-09-03 ENCOUNTER — OFFICE VISIT (OUTPATIENT)
Dept: NEUROSURGERY | Facility: CLINIC | Age: 54
End: 2019-09-03

## 2019-09-03 VITALS
BODY MASS INDEX: 20.89 KG/M2 | HEIGHT: 65 IN | SYSTOLIC BLOOD PRESSURE: 90 MMHG | DIASTOLIC BLOOD PRESSURE: 62 MMHG | WEIGHT: 125.4 LBS

## 2019-09-03 DIAGNOSIS — G44.009 CLUSTER HEADACHE, NOT INTRACTABLE, UNSPECIFIED CHRONICITY PATTERN: ICD-10-CM

## 2019-09-03 DIAGNOSIS — M50.30 DEGENERATION OF CERVICAL INTERVERTEBRAL DISC: Primary | ICD-10-CM

## 2019-09-03 DIAGNOSIS — Z78.9 CURRENT NON-SMOKER: ICD-10-CM

## 2019-09-03 DIAGNOSIS — M54.2 NECK PAIN: ICD-10-CM

## 2019-09-03 PROCEDURE — 99214 OFFICE O/P EST MOD 30 MIN: CPT | Performed by: NEUROLOGICAL SURGERY

## 2019-09-03 NOTE — PROGRESS NOTES
SUBJECTIVE:  Patient ID: Jyoti Pleitez is a 54 y.o. female is here today for follow-up.    Chief Complaint: Neck pain  Chief Complaint   Patient presents with   • Neck Pain     patient has imaging @ [facility]; she states this is intermittent, not constant; she states stress makes this worse.   • Headache     patient has imaging @ [facility]; headaches are intermittent and not constant; she states she has some ocular involvement.  She states she hit her head in June/July this year and it had symptoms for approx 6 weeks afterwards.       HPI  54-year-old female who gives a several year history of waxing and waning on and off again neck pain and stiffness.  She describes maybe 2 weeks of relative comfort and then she can have 2 weeks of pain through her neck and shoulders and occasional occipital headaches.  She denies any upper extremity radicular pain numbness or tingling.  She says it does seem to be worse with stress.  She definitely has difficulty with range of motion during these episodes.  She is done chiropractic care in the past with minimal improvement.  She is done no physical therapy or pain management injections.  She has taken some prescription nonsteroidals and muscle relaxers also in the past.  She works in a office as a .    The following portions of the patient's history were reviewed and updated as appropriate: allergies, current medications, past family history, past medical history, past social history, past surgical history and problem list.    OBJECTIVE:    Review of Systems   Musculoskeletal: Positive for myalgias, neck pain and neck stiffness.   All other systems reviewed and are negative.         Physical Exam   Constitutional: She is oriented to person, place, and time. She appears well-developed and well-nourished.   HENT:   Head: Normocephalic and atraumatic.   Right Ear: Hearing normal.   Left Ear: Hearing normal.   Eyes: EOM are normal. Pupils are equal, round, and reactive to  light.   Neck: Normal range of motion.   Neurological: She is alert and oriented to person, place, and time. She has normal strength and normal reflexes. No cranial nerve deficit or sensory deficit. She displays a negative Romberg sign. GCS eye subscore is 4. GCS verbal subscore is 5. GCS motor subscore is 6. She displays no Babinski's sign on the right side. She displays no Babinski's sign on the left side.   Psychiatric: Her speech is normal. Judgment normal. Cognition and memory are normal.       Neurologic Exam     Mental Status   Oriented to person, place, and time.   Speech: speech is normal     Cranial Nerves     CN III, IV, VI   Pupils are equal, round, and reactive to light.  Extraocular motions are normal.     Motor Exam     Strength   Strength 5/5 throughout.       Independent Review of Radiographic Studies:   Cervical spine plain x-rays and a CT scan from 2017 and 18 show degenerative disc disease at C6-7 with some triggering of the normal lordotic curve and suggestion of some kyphosis.  There is bilateral foraminal stenosis at that level.    ASSESSMENT/PLAN:  The patient is complaining of cervicalgia and some occipital headaches.  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  We explained this in detail to her.  I think the next logical step would be a dedicated course of physical therapy with some traction.  In addition we are to prescribe a compounding cream.  Not convinced at this point that is since single level discectomy would address her issues.  We will see her in follow-up in about 6 weeks.      No diagnosis found.        No Follow-up on file.      Haroon Mcneill MD

## 2019-10-26 ENCOUNTER — OFFICE VISIT (OUTPATIENT)
Dept: RETAIL CLINIC | Facility: CLINIC | Age: 54
End: 2019-10-26

## 2019-10-26 VITALS
OXYGEN SATURATION: 97 % | HEART RATE: 69 BPM | SYSTOLIC BLOOD PRESSURE: 95 MMHG | DIASTOLIC BLOOD PRESSURE: 65 MMHG | TEMPERATURE: 98.9 F

## 2019-10-26 DIAGNOSIS — N30.01 ACUTE CYSTITIS WITH HEMATURIA: Primary | ICD-10-CM

## 2019-10-26 DIAGNOSIS — B37.31 VAGINAL YEAST INFECTION: ICD-10-CM

## 2019-10-26 LAB
BILIRUB BLD-MCNC: NEGATIVE MG/DL
CLARITY, POC: CLEAR
COLOR UR: YELLOW
GLUCOSE UR STRIP-MCNC: NEGATIVE MG/DL
KETONES UR QL: NEGATIVE
LEUKOCYTE EST, POC: ABNORMAL
NITRITE UR-MCNC: NEGATIVE MG/ML
PH UR: 6 [PH] (ref 5–8)
PROT UR STRIP-MCNC: NEGATIVE MG/DL
RBC # UR STRIP: ABNORMAL /UL
SP GR UR: 1.02 (ref 1–1.03)
UROBILINOGEN UR QL: NORMAL

## 2019-10-26 PROCEDURE — 81003 URINALYSIS AUTO W/O SCOPE: CPT | Performed by: NURSE PRACTITIONER

## 2019-10-26 PROCEDURE — 99213 OFFICE O/P EST LOW 20 MIN: CPT | Performed by: NURSE PRACTITIONER

## 2019-10-26 RX ORDER — FLUCONAZOLE 150 MG/1
150 TABLET ORAL ONCE
Qty: 2 TABLET | Refills: 0 | Status: SHIPPED | OUTPATIENT
Start: 2019-10-26 | End: 2019-10-26

## 2019-10-26 RX ORDER — NITROFURANTOIN 25; 75 MG/1; MG/1
100 CAPSULE ORAL 2 TIMES DAILY
Qty: 14 CAPSULE | Refills: 0 | Status: SHIPPED | OUTPATIENT
Start: 2019-10-26 | End: 2019-11-02

## 2019-10-26 NOTE — PROGRESS NOTES
Subjective   Jyoti Pleitez is a 54 y.o. female.     Urinary Tract Infection    This is a new problem. The current episode started 1 to 4 weeks ago (Last couple of weeks). The problem has been gradually worsening (Burning worsened this morning). The quality of the pain is described as burning (Started with burning in the morning). There has been no fever. Associated symptoms include frequency and urgency. Pertinent negatives include no hematuria, nausea or vomiting. Associated symptoms comments: No odor today. Treatments tried: AZO. Her past medical history is significant for recurrent UTIs. Frequent UTIs since High school   Vaginal Itching   The patient's primary symptoms include vaginal discharge (Creamy; thick, cheesy- small amounts). This is a new problem. The current episode started in the past 7 days. The problem has been gradually worsening. She is not pregnant. Associated symptoms include dysuria, frequency and urgency. Pertinent negatives include no diarrhea, fever, hematuria, nausea or vomiting. Vaginal discharge characteristics: Creamy; thick. There has been no bleeding. (Frequent UTIs since High school)    She denies new sexual partner.  Denies exposure to STD    The following portions of the patient's history were reviewed and updated as appropriate: allergies, current medications, past family history, past medical history, past social history, past surgical history and problem list.    Review of Systems   Constitutional: Negative for fever.   Respiratory: Negative for shortness of breath.    Cardiovascular: Negative for chest pain.   Gastrointestinal: Negative for diarrhea, nausea and vomiting.   Genitourinary: Positive for dysuria, frequency, urgency and vaginal discharge (Creamy; thick, cheesy- small amounts). Negative for hematuria, vaginal bleeding and vaginal pain.       Objective   Physical Exam   Constitutional: She appears well-developed and well-nourished. No distress.   Cardiovascular:  "Normal rate, regular rhythm and normal heart sounds. Exam reveals no gallop and no friction rub.   No murmur heard.  Pulmonary/Chest: Effort normal and breath sounds normal. No stridor. No respiratory distress. She has no decreased breath sounds. She has no wheezes. She has no rhonchi. She has no rales.   Abdominal: Soft. There is no tenderness. There is no CVA tenderness.   Neurological: She is alert.   Skin: Skin is warm and dry. She is not diaphoretic.   Psychiatric: She has a normal mood and affect. Her behavior is normal. Thought content normal.         Assessment/Plan   Jyoti was seen today for urinary tract infection.    Diagnoses and all orders for this visit:    Acute cystitis with hematuria    Vaginal yeast infection    Other orders  -     nitrofurantoin, macrocrystal-monohydrate, (MACROBID) 100 MG capsule; Take 1 capsule by mouth 2 (Two) Times a Day for 7 days.  -     fluconazole (DIFLUCAN) 150 MG tablet; Take 1 tablet by mouth 1 (One) Time for 1 dose. May repeat at end of antibiotic if needed    While in office patient stated she's been having some \"panic attacks\" that last about 5 minutes that resolve with taking deep breaths.  She denies chest pain but states she feels short of breath.  She denies any of this at the time of the visit.  She reports she has been dealing with a lot of stress due to medical issues involving her father.  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  She states in the past few months she has also noticed cramping to her left upper abdomen after eating.  She denies any of these symptoms while in the office today.  She states recently her father has been feeling better and told her she didn't have to visit as much.  She states she has noticed some improvement of symptoms over the past week having a bit of a break from everything.  Advised to give herself time to rest and schedule follow up with primary provider to discuss further.  Explained this could be " stressed induced but could also have other causes.  She reports she has family and support she can talk to.  Instructed to return to office if she needs someone to talk to.       Increase fluid intake  Avoid caffeine and carbonation  Follow up with PCP at completion of therapy for recheck  If symptoms worsen or you develop fever, follow up with PCP  If you develop shortness of breath, chest pain, go to ER!  Schedule with primary provider to discuss stress related symptoms of panic attacks and possible Gi disturbance

## 2019-10-26 NOTE — PATIENT INSTRUCTIONS
Increase fluid intake  Avoid caffeine and carbonation  Follow up with PCP at completion of therapy for recheck  If symptoms worsen or you develop fever, follow up with PCP  If you develop shortness of breath, chest pain, go to ER!  Schedule with primary provider to discuss stress related symptoms of panic attacks and possible Gi disturbance      Vaginal Yeast infection, Adult    Vaginal yeast infection is a condition that causes soreness, swelling, and redness (inflammation) of the vagina. It also causes vaginal discharge. This is a common condition. Some women get this infection frequently.  What are the causes?  This condition is caused by a change in the normal balance of the yeast (candida) and bacteria that live in the vagina. This change causes an overgrowth of yeast, which causes the inflammation.  What increases the risk?  This condition is more likely to develop in:  · Women who take antibiotic medicines.  · Women who have diabetes.  · Women who take birth control pills.  · Women who are pregnant.  · Women who douche often.  · Women who have a weak defense (immune) system.  · Women who have been taking steroid medicines for a long time.  · Women who frequently wear tight clothing.  What are the signs or symptoms?  Symptoms of this condition include:  · White, thick vaginal discharge.  · Swelling, itching, redness, and irritation of the vagina. The lips of the vagina (vulva) may be affected as well.  · Pain or a burning feeling while urinating.  · Pain during sex.  How is this diagnosed?  This condition is diagnosed with a medical history and physical exam. This will include a pelvic exam. Your health care provider will examine a sample of your vaginal discharge under a microscope. Your health care provider may send this sample for testing to confirm the diagnosis.  How is this treated?  This condition is treated with medicine. Medicines may be over-the-counter or prescription. You may be told to use one or  more of the following:  · Medicine that is taken orally.  · Medicine that is applied as a cream.  · Medicine that is inserted directly into the vagina (suppository).  Follow these instructions at home:    · Take or apply over-the-counter and prescription medicines only as told by your health care provider.  · Do not have sex until your health care provider has approved. Tell your sex partner that you have a yeast infection. That person should go to his or her health care provider if he or she develops symptoms.  · Do not wear tight clothes, such as pantyhose or tight pants.  · Avoid using tampons until your health care provider approves.  · Eat more yogurt. This may help to keep your yeast infection from returning.  · Try taking a sitz bath to help with discomfort. This is a warm water bath that is taken while you are sitting down. The water should only come up to your hips and should cover your buttocks. Do this 3-4 times per day or as told by your health care provider.  · Do not douche.  · Wear breathable, cotton underwear.  · If you have diabetes, keep your blood sugar levels under control.  Contact a health care provider if:  · You have a fever.  · Your symptoms go away and then return.  · Your symptoms do not get better with treatment.  · Your symptoms get worse.  · You have new symptoms.  · You develop blisters in or around your vagina.  · You have blood coming from your vagina and it is not your menstrual period.  · You develop pain in your abdomen.  This information is not intended to replace advice given to you by your health care provider. Make sure you discuss any questions you have with your health care provider.  Document Released: 09/27/2006 Document Revised: 04/05/2018 Document Reviewed: 06/20/2016  [employer] Interactive Patient Education © 2019 [employer]        Urinary Tract Infection, Adult  A urinary tract infection (UTI) is an infection of any part of the urinary tract. The urinary tract  includes the:  · Kidneys.  · Ureters.  · Bladder.  · Urethra.  These organs make, store, and get rid of pee (urine) in the body.  Follow these instructions at home:    · Take over-the-counter and prescription medicines only as told by your doctor.  · If you were prescribed an antibiotic medicine, take it as told by your doctor. Do not stop taking it even if you start to feel better.  · Drink enough fluid to keep your pee (urine) pale yellow. For most people, this is 6-10 glasses of water each day.  · Keep all follow-up visits as told by your doctor. This is important.  · Make sure you:  ? Empty your bladder often and completely. Do not hold pee for long periods of time.  ? Empty your bladder after sex.  ? Wipe from front to back after a bowel movement if you are female. Use each tissue one time when you wipe.  Contact a doctor if:  · Your symptoms do not get better after 1-2 days.  · Your symptoms go away and then come back.  Get help right away if:  · You have very bad pain in your back.  · You have very bad pain in your lower belly (abdomen).  · You have a fever.  · You are sick to your stomach (nauseous).  · You are throwing up (vomiting).  Summary  · A urinary tract infection (UTI) is an infection of any part of the urinary tract.  · If you were prescribed an antibiotic medicine, take it as told by your doctor. Do not stop taking it even if you start to feel better.  · Drink enough fluid to keep your pee (urine) pale yellow.  This information is not intended to replace advice given to you by your health care provider. Make sure you discuss any questions you have with your health care provider.  Document Released: 06/05/2009 Document Revised: 02/12/2019 Document Reviewed: 11/07/2016  [employer] Interactive Patient Education © 2019 [employer]

## 2019-10-30 DIAGNOSIS — Z78.0 MENOPAUSE: ICD-10-CM

## 2019-10-30 RX ORDER — MEDROXYPROGESTERONE ACETATE 2.5 MG/1
TABLET ORAL
Qty: 30 TABLET | Refills: 0 | Status: CANCELLED | OUTPATIENT
Start: 2019-10-30

## 2019-11-01 ENCOUNTER — TELEPHONE (OUTPATIENT)
Dept: OBSTETRICS AND GYNECOLOGY | Facility: CLINIC | Age: 54
End: 2019-11-01

## 2019-11-01 DIAGNOSIS — Z78.0 MENOPAUSE: ICD-10-CM

## 2019-11-01 RX ORDER — MEDROXYPROGESTERONE ACETATE 2.5 MG/1
2.5 TABLET ORAL DAILY
Qty: 30 TABLET | Refills: 2 | Status: SHIPPED | OUTPATIENT
Start: 2019-11-01 | End: 2020-01-02 | Stop reason: SDUPTHER

## 2020-01-02 ENCOUNTER — TELEPHONE (OUTPATIENT)
Dept: OBSTETRICS AND GYNECOLOGY | Facility: CLINIC | Age: 55
End: 2020-01-02

## 2020-01-02 DIAGNOSIS — Z78.0 MENOPAUSE: ICD-10-CM

## 2020-01-02 RX ORDER — MEDROXYPROGESTERONE ACETATE 2.5 MG/1
2.5 TABLET ORAL DAILY
Qty: 30 TABLET | Refills: 0 | Status: SHIPPED | OUTPATIENT
Start: 2020-01-02 | End: 2020-04-21

## 2020-01-02 RX ORDER — ESTRADIOL 2 MG/1
2 TABLET ORAL DAILY
Qty: 30 TABLET | Refills: 0 | Status: SHIPPED | OUTPATIENT
Start: 2020-01-02 | End: 2020-02-11

## 2020-01-02 NOTE — TELEPHONE ENCOUNTER
Pt calls requesting refill on Provera and Estrace to get thru to her appt on 1/31/20.  Ordered with no refills.

## 2020-01-10 ENCOUNTER — HOSPITAL ENCOUNTER (OUTPATIENT)
Dept: GENERAL RADIOLOGY | Facility: HOSPITAL | Age: 55
Discharge: HOME | End: 2020-01-10
Admitting: PHYSICIAN ASSISTANT

## 2020-01-10 ENCOUNTER — TRANSCRIBE ORDERS (OUTPATIENT)
Dept: ADMINISTRATIVE | Facility: HOSPITAL | Age: 55
End: 2020-01-10

## 2020-01-10 DIAGNOSIS — M54.50 LOW BACK PAIN, UNSPECIFIED BACK PAIN LATERALITY, UNSPECIFIED CHRONICITY, UNSPECIFIED WHETHER SCIATICA PRESENT: Primary | ICD-10-CM

## 2020-01-10 DIAGNOSIS — M54.50 LOW BACK PAIN, UNSPECIFIED BACK PAIN LATERALITY, UNSPECIFIED CHRONICITY, UNSPECIFIED WHETHER SCIATICA PRESENT: ICD-10-CM

## 2020-01-10 DIAGNOSIS — N21.8 CALCULUS OF OTHER LOWER URINARY TRACT LOCATION: ICD-10-CM

## 2020-01-10 DIAGNOSIS — R30.0 DYSURIA: ICD-10-CM

## 2020-01-10 PROCEDURE — 74018 RADEX ABDOMEN 1 VIEW: CPT

## 2020-01-31 ENCOUNTER — OFFICE VISIT (OUTPATIENT)
Dept: OBSTETRICS AND GYNECOLOGY | Facility: CLINIC | Age: 55
End: 2020-01-31

## 2020-01-31 VITALS
SYSTOLIC BLOOD PRESSURE: 98 MMHG | HEIGHT: 65 IN | DIASTOLIC BLOOD PRESSURE: 62 MMHG | WEIGHT: 123 LBS | BODY MASS INDEX: 20.49 KG/M2

## 2020-01-31 DIAGNOSIS — Z78.0 MENOPAUSE: ICD-10-CM

## 2020-01-31 DIAGNOSIS — R68.82 LIBIDO, DECREASED: ICD-10-CM

## 2020-01-31 DIAGNOSIS — Z01.419 ENCOUNTER FOR GYNECOLOGICAL EXAMINATION WITHOUT ABNORMAL FINDING: Primary | ICD-10-CM

## 2020-01-31 PROCEDURE — 99396 PREV VISIT EST AGE 40-64: CPT | Performed by: OBSTETRICS & GYNECOLOGY

## 2020-02-11 DIAGNOSIS — Z78.0 MENOPAUSE: ICD-10-CM

## 2020-02-11 RX ORDER — ESTRADIOL 2 MG/1
TABLET ORAL
Qty: 30 TABLET | Refills: 0 | Status: SHIPPED | OUTPATIENT
Start: 2020-02-11 | End: 2020-03-16

## 2020-02-14 ENCOUNTER — APPOINTMENT (OUTPATIENT)
Dept: MAMMOGRAPHY | Facility: HOSPITAL | Age: 55
End: 2020-02-14

## 2020-02-14 ENCOUNTER — APPOINTMENT (OUTPATIENT)
Dept: BONE DENSITY | Facility: HOSPITAL | Age: 55
End: 2020-02-14

## 2020-03-12 ENCOUNTER — TELEPHONE (OUTPATIENT)
Dept: OBSTETRICS AND GYNECOLOGY | Facility: CLINIC | Age: 55
End: 2020-03-12

## 2020-03-13 DIAGNOSIS — Z78.0 MENOPAUSE: ICD-10-CM

## 2020-03-16 RX ORDER — ESTRADIOL 2 MG/1
TABLET ORAL
Qty: 30 TABLET | Refills: 11 | Status: SHIPPED | OUTPATIENT
Start: 2020-03-16 | End: 2020-07-17 | Stop reason: SDUPTHER

## 2020-04-14 DIAGNOSIS — Z78.0 MENOPAUSE: ICD-10-CM

## 2020-04-21 RX ORDER — MEDROXYPROGESTERONE ACETATE 2.5 MG/1
TABLET ORAL
Qty: 30 TABLET | Refills: 0 | Status: SHIPPED | OUTPATIENT
Start: 2020-04-21 | End: 2020-04-24 | Stop reason: SDUPTHER

## 2020-04-24 ENCOUNTER — OFFICE VISIT (OUTPATIENT)
Dept: OBSTETRICS AND GYNECOLOGY | Facility: CLINIC | Age: 55
End: 2020-04-24

## 2020-04-24 DIAGNOSIS — Z87.891 FORMER SMOKER: ICD-10-CM

## 2020-04-24 DIAGNOSIS — Z78.0 MENOPAUSE: ICD-10-CM

## 2020-04-24 DIAGNOSIS — R68.82 LIBIDO, DECREASED: Primary | ICD-10-CM

## 2020-04-24 PROCEDURE — 99422 OL DIG E/M SVC 11-20 MIN: CPT | Performed by: OBSTETRICS & GYNECOLOGY

## 2020-04-24 RX ORDER — MEDROXYPROGESTERONE ACETATE 2.5 MG/1
2.5 TABLET ORAL DAILY
Qty: 30 TABLET | Refills: 3 | Status: SHIPPED | OUTPATIENT
Start: 2020-04-24 | End: 2020-07-17 | Stop reason: SDUPTHER

## 2020-04-24 RX ORDER — OLOPATADINE HYDROCHLORIDE 2 MG/ML
SOLUTION/ DROPS OPHTHALMIC
COMMUNITY
Start: 2020-02-28 | End: 2021-01-15

## 2020-04-24 NOTE — PROGRESS NOTES
"Subjective   No chief complaint on file.    Jyoti Pleitez is a 55 y.o. year old .  No LMP recorded. Patient has had a hysterectomy.  Telephone encounter to discuss HRT surveillance.  Patient doing well with current regimen.  She is taking oral estrace and provera; she is supplementing with testosterone cream and oxytocin nasal spray. Patient staying with her father during quarantine, she is only working a few hours/week.  She is definitely trying to minimize outside contact.    Patient has two lipomas - one on her thigh and on her neck.  The one on her thigh has been present for a long time, she just recently noted the one on her neck, but reports it is non-tender, freely mobile and only the size of a pea.  Patient does not want general surgery referral at this time; she will monitor size.    Patient also reports that her \"thyroid was a little low\" at her PCP annual exam.  Those labs were in past 1-2 months.  No supplementation has been recommended yet.    The following portions of the patient's history were reviewed and updated as appropriate:current medications and allergies    Social History    Tobacco Use      Smoking status: Former Smoker        Years: 14.00        Types: Cigarettes      Smokeless tobacco: Never Used    Review of Systems   Constitutional: Positive for fatigue (mild, but has been exercising more lately since quarantine has given her time to do so). Negative for activity change and unexpected weight change.   Respiratory: Negative for shortness of breath.    Cardiovascular: Negative for chest pain.   Gastrointestinal: Negative for abdominal pain.   Genitourinary: Negative for pelvic pain and vaginal bleeding.   Psychiatric/Behavioral: Negative for dysphoric mood. The patient is not nervous/anxious.          Objective   There were no vitals taken for this visit.    Physical Exam: N/A, telephone encounter    Lab Review   No data reviewed    Imaging   No data reviewed     Assessment & " Plan    Jyoti was seen today for med refill.    Diagnoses and all orders for this visit:    Libido, decreased: Definitely better while taking testosterone and oxytocin, will call refills of both to  pharmacy and get JONES    Menopause: patient still has refills of estrace, but is out of provera, sending to pharmacy.  Mammogram and bone density both overdue, but could not be scheduled due to quarantine.  She is going to call and see if they are starting to schedule appointments again yet  Comments:  HRT refilled.  Script given for compounded testosterone cream.  Orders:  -     medroxyPROGESTERone (PROVERA) 2.5 MG tablet; Take 1 tablet by mouth Daily.    Former smoker      This note was electronically signed.    Rossy Georges MD  April 24, 2020  10:26    This visit has been rescheduled as a phone visit to comply with patient safety concerns in accordance with CDC recommendations. Total time of discussion was 15 minutes.

## 2020-07-09 ENCOUNTER — HOSPITAL ENCOUNTER (OUTPATIENT)
Dept: MAMMOGRAPHY | Facility: HOSPITAL | Age: 55
Discharge: HOME | End: 2020-07-09
Admitting: OBSTETRICS & GYNECOLOGY

## 2020-07-09 ENCOUNTER — HOSPITAL ENCOUNTER (OUTPATIENT)
Dept: BONE DENSITY | Facility: HOSPITAL | Age: 55
Discharge: HOME | End: 2020-07-09

## 2020-07-09 DIAGNOSIS — Z01.419 ENCOUNTER FOR GYNECOLOGICAL EXAMINATION WITHOUT ABNORMAL FINDING: ICD-10-CM

## 2020-07-09 PROCEDURE — 77063 BREAST TOMOSYNTHESIS BI: CPT

## 2020-07-09 PROCEDURE — 77080 DXA BONE DENSITY AXIAL: CPT

## 2020-07-09 PROCEDURE — 77067 SCR MAMMO BI INCL CAD: CPT

## 2020-07-17 ENCOUNTER — TELEPHONE (OUTPATIENT)
Dept: OBSTETRICS AND GYNECOLOGY | Facility: CLINIC | Age: 55
End: 2020-07-17

## 2020-07-17 ENCOUNTER — OFFICE VISIT (OUTPATIENT)
Dept: OBSTETRICS AND GYNECOLOGY | Facility: CLINIC | Age: 55
End: 2020-07-17

## 2020-07-17 VITALS
WEIGHT: 116 LBS | DIASTOLIC BLOOD PRESSURE: 64 MMHG | SYSTOLIC BLOOD PRESSURE: 106 MMHG | HEIGHT: 65 IN | BODY MASS INDEX: 19.33 KG/M2

## 2020-07-17 DIAGNOSIS — Z87.891 FORMER SMOKER: ICD-10-CM

## 2020-07-17 DIAGNOSIS — R68.82 LIBIDO, DECREASED: ICD-10-CM

## 2020-07-17 DIAGNOSIS — Z78.0 MENOPAUSE: ICD-10-CM

## 2020-07-17 DIAGNOSIS — Z12.11 ENCOUNTER FOR SCREENING COLONOSCOPY: Primary | ICD-10-CM

## 2020-07-17 PROCEDURE — 99213 OFFICE O/P EST LOW 20 MIN: CPT | Performed by: OBSTETRICS & GYNECOLOGY

## 2020-07-17 RX ORDER — MEDROXYPROGESTERONE ACETATE 2.5 MG/1
2.5 TABLET ORAL DAILY
Qty: 30 TABLET | Refills: 11 | Status: SHIPPED | OUTPATIENT
Start: 2020-07-17 | End: 2020-10-16 | Stop reason: SDUPTHER

## 2020-07-17 RX ORDER — ESTRADIOL 2 MG/1
2 TABLET ORAL DAILY
Qty: 30 TABLET | Refills: 11 | Status: SHIPPED | OUTPATIENT
Start: 2020-07-17 | End: 2021-03-26 | Stop reason: SDUPTHER

## 2020-07-17 NOTE — TELEPHONE ENCOUNTER
3 months of refills on oxytocin nasal spray and testosterone cream called to  pharmacy, spoke with Geo.

## 2020-07-17 NOTE — TELEPHONE ENCOUNTER
----- Message from Rossy Georges MD sent at 7/17/2020 11:24 AM CDT -----  Please call in refills on testosterone cream and oxytocin nasal spray, both for three months.  Also, needs JONES

## 2020-07-17 NOTE — PROGRESS NOTES
"Subjective   Chief Complaint   Patient presents with   • Med Refill     pt here for f/u and med refill of HRT  estrac, provera and testosterone cream. pt had bone density and mammogram 2020 and wants to disc bone density.     Jyoti Pleitez is a 55 y.o. year old .  No LMP recorded. Patient has had a hysterectomy.  Patient presents today for 3 month surveillance to discuss HRT.  Patient doing well with current regimen.  She is taking oral estrace and provera; she is supplementing with testosterone cream and oxytocin nasal spray. Patient has been staying with her father during quarantine, and she is exhausted.  Her sister is coming to give them a two week break at the end of the month.    Patient has two lipomas - one on her thigh and on her neck.  The one on her thigh has been present for a long time, she just recently noted the one on her neck, but reports it is non-tender, freely mobile and only the size of a pea.  Patient does not want general surgery referral at this time; she will monitor size.  They remain stable, patient has noticed no difference.    The following portions of the patient's history were reviewed and updated as appropriate:current medications and allergies    Social History    Tobacco Use      Smoking status: Former Smoker        Years: 14.00        Types: Cigarettes      Smokeless tobacco: Never Used    Review of Systems   Constitutional: Negative for activity change, fatigue and unexpected weight change.   Respiratory: Negative for shortness of breath.    Cardiovascular: Negative for chest pain.   Gastrointestinal: Negative for abdominal pain.   Genitourinary: Negative for pelvic pain and vaginal bleeding.   Psychiatric/Behavioral: Negative for dysphoric mood. The patient is not nervous/anxious.          Objective   /64   Ht 165.1 cm (65\")   Wt 52.6 kg (116 lb)   Breastfeeding No   BMI 19.30 kg/m²     Physical Exam   Constitutional: She is oriented to person, place, and " time. She appears well-developed and well-nourished. No distress.   HENT:   Head: Normocephalic and atraumatic.   Eyes: EOM are normal.   Neck: Normal range of motion.   Pulmonary/Chest: Effort normal.   Abdominal: Soft. She exhibits no distension. There is no tenderness.   Musculoskeletal: Normal range of motion.   Neurological: She is alert and oriented to person, place, and time.   Skin: Skin is warm and dry.   Psychiatric: She has a normal mood and affect. Her behavior is normal. Judgment normal.   Nursing note and vitals reviewed.  :    Lab Review   No data reviewed    Imaging   No data reviewed     Assessment & Plan    Jyoti was seen today for med refill.    Diagnoses and all orders for this visit:    Libido, decreased: Definitely better while taking testosterone and oxytocin, will call refills of both to  pharmacy and get JONES    Menopause: patient still has refills of estrace and provera.  Mammogram and bone density both both previously ordered, but could not be scheduled due to quarantine.  Both tests reordered now.  Comments:  Script called in for compounded testosterone cream and oxytocin nasal spray.    Former smoker    Patient due for screening colonoscopy.  GI referral placed      This note was electronically signed.    Rossy Georges MD  July 17, 2020  11:12

## 2020-08-14 ENCOUNTER — TELEMEDICINE (OUTPATIENT)
Dept: GASTROENTEROLOGY | Facility: CLINIC | Age: 55
End: 2020-08-14

## 2020-08-14 DIAGNOSIS — Z86.010 HISTORY OF COLON POLYPS: Primary | ICD-10-CM

## 2020-08-14 PROCEDURE — S0285 CNSLT BEFORE SCREEN COLONOSC: HCPCS | Performed by: NURSE PRACTITIONER

## 2020-08-14 RX ORDER — SODIUM PICOSULFATE, MAGNESIUM OXIDE, AND ANHYDROUS CITRIC ACID 10; 3.5; 12 MG/160ML; G/160ML; G/160ML
1 LIQUID ORAL TAKE AS DIRECTED
Qty: 320 ML | Refills: 0 | Status: SHIPPED | OUTPATIENT
Start: 2020-08-14 | End: 2021-01-15

## 2020-08-14 NOTE — PROGRESS NOTES
Chief Complaint   Patient presents with   • Colonoscopy   • Constipation   • Diarrhea   • Nausea       PCP: Romaine Lawrence MD  REFER: Rossy Georges MD    Subjective     HPI    VIDEO VISIT    Jyoti Pleitez is a 55 y.o. female who presents to office for preventative maintenance.  There is  a personal history of colon polyps.  There is not a history of colon cancer.  She does not have complaints of vomiting,  weight loss, no BRBPR, no melena.  There is not a family history of colon cancer.  There is not a family history of colon polyps.  She last colonoscopy-2015 .  Bowels do move on regular basis.  She has moved with her father to help care for him since he has been on hospice.  She has experienced increase in diarrhea, she will have occasional constipation, and increase in nausea.  Symptoms more prominent when she has increased anxiety.  She has experienced similar symptoms with anxiety for many years and does not wish to take medication.       CScope (Dr Cannon) 2015-normal   CScope (Dr Cannon) 2012-rectal polyp      Past Medical History:   Diagnosis Date   • Back problem    • Low back pain    • Neck pain    • Varicose vein of leg      Past Surgical History:   Procedure Laterality Date   • AUGMENTATION MAMMAPLASTY  1997   • BREAST AUGMENTATION     • FOOT SURGERY Bilateral    • HYSTERECTOMY      BIB [facility] for uterine fibroids in 2008 by Dr Acosta   • OOPHORECTOMY     • TONSILLECTOMY     • WISDOM TOOTH EXTRACTION       Outpatient Medications Marked as Taking for the 8/14/20 encounter (Telemedicine) with Edmar Hewitt APRN   Medication Sig Dispense Refill   • estradiol (ESTRACE) 2 MG tablet Take 1 tablet by mouth Daily. 30 tablet 11   • medroxyPROGESTERone (PROVERA) 2.5 MG tablet Take 1 tablet by mouth Daily. 30 tablet 11   • NON FORMULARY 1 application every night at bedtime. 1% testosterone cream     • olopatadine (PATADAY) 0.2 % solution ophthalmic solution        Allergies   Allergen Reactions   •  Sulfa Antibiotics Hives and Swelling     Social History     Socioeconomic History   • Marital status:      Spouse name: Not on file   • Number of children: Not on file   • Years of education: Not on file   • Highest education level: Not on file   Tobacco Use   • Smoking status: Former Smoker     Years: 14.00     Types: Cigarettes   • Smokeless tobacco: Never Used   Substance and Sexual Activity   • Alcohol use: Yes     Alcohol/week: 1.0 standard drinks     Types: 1 Glasses of wine per week   • Drug use: No   • Sexual activity: Yes     Partners: Male     Birth control/protection: Surgical     Family History   Problem Relation Age of Onset   • Lung cancer Mother    • Coronary artery disease Father    • Hypertension Father    • Prostate cancer Father    • Coronary artery disease Brother    • Coronary artery disease Maternal Grandfather    • Colon cancer Cousin    • Breast cancer Maternal Aunt    • No Known Problems Sister    • No Known Problems Daughter    • No Known Problems Son    • No Known Problems Maternal Grandmother    • No Known Problems Paternal Grandmother    • No Known Problems Paternal Aunt    • No Known Problems Other    • BRCA 1/2 Neg Hx    • Endometrial cancer Neg Hx    • Ovarian cancer Neg Hx    • Uterine cancer Neg Hx      Review of Systems   Constitutional: Negative for fatigue, fever and unexpected weight change.   HENT: Negative for hearing loss, sore throat and voice change.    Eyes: Negative for visual disturbance.   Respiratory: Negative for cough, shortness of breath and wheezing.    Cardiovascular: Negative for chest pain and palpitations.   Gastrointestinal: Negative for abdominal pain, blood in stool and vomiting.   Endocrine: Negative for polydipsia and polyuria.   Genitourinary: Negative for difficulty urinating, dysuria, hematuria and urgency.   Musculoskeletal: Negative for joint swelling and myalgias.   Skin: Negative for color change, rash and wound.   Neurological: Negative for  dizziness, tremors, seizures and syncope.   Hematological: Does not bruise/bleed easily.   Psychiatric/Behavioral: Negative for agitation and confusion. The patient is not nervous/anxious.      Objective   There were no vitals filed for this visit.  Physical Exam   Constitutional: She appears well-developed and well-nourished.   HENT:   Head: Atraumatic.   Nose: Nose normal.   Eyes: Conjunctivae and EOM are normal. Right eye exhibits no discharge. Left eye exhibits no discharge. Scleral icterus is present.   Neck: Neck normal appearance.  Pulmonary/Chest: Effort normal.   Abdominal: Abdomen appears normal.   Musculoskeletal: Normal range of motion.   Neurological: She is alert.   Skin: Skin is dry.   Psychiatric: She has a normal mood and affect.      Imaging Results (Most Recent)     None        There is no height or weight on file to calculate BMI.    Assessment/Plan   Jyoti was seen today for colonoscopy, constipation, diarrhea and nausea.    Diagnoses and all orders for this visit:    History of colon polyps  -     Case Request; Standing  -     Case Request    Other orders  -     CLENPIQ 10-3.5-12 MG-GM -GM/160ML solution; Take 1 each by mouth Take As Directed.      COLONOSCOPY WITH ANESTHESIA (N/A)    Encouraged her take few minutes everyday for herself    Advised pt to stop ASA, use of NSAIDs, Fish Oil, and MV 5 days prior to procedure, per Dr Cannon protocol.  Tylenol based products are ok to take.  Pt verbalized understanding.     Precautions are currently being put in place due to COVID-19.  I have explained to Jyoti Pleitez they will be required to undergo COVID testing prior to their procedure.  Jyoti Pleitez verbalized understanding and was willing to proceed.    All risks, benefits, alternatives, and indications of colonoscopy procedure have been discussed with the patient. Risks to include perforation of the colon requiring possible surgery or colostomy, risk of bleeding from biopsies or  removal of colon tissue, possibility of missing a colon polyp or cancer, or adverse drug reaction.  Benefits to include the diagnosis and management of disease of the colon and rectum. Alternatives to include barium enema, radiographic evaluation, lab testing or no intervention. She verbalizes understanding and agrees.     This was an audio and video enabled telemedicine encounter. This visit was conducted with me in my office and Jyoti Pleitez at their home      Edmar Hewitt, APRN  08/14/20        There are no Patient Instructions on file for this visit.

## 2020-08-19 PROBLEM — Z86.0100 HISTORY OF COLON POLYPS: Status: ACTIVE | Noted: 2020-08-19

## 2020-08-19 PROBLEM — Z86.010 HISTORY OF COLON POLYPS: Status: ACTIVE | Noted: 2020-08-19

## 2020-08-28 ENCOUNTER — TRANSCRIBE ORDERS (OUTPATIENT)
Dept: ADMINISTRATIVE | Facility: HOSPITAL | Age: 55
End: 2020-08-28

## 2020-08-28 DIAGNOSIS — Z01.818 PRE-OP TESTING: Primary | ICD-10-CM

## 2020-09-01 ENCOUNTER — LAB (OUTPATIENT)
Dept: LAB | Facility: HOSPITAL | Age: 55
End: 2020-09-01

## 2020-09-01 PROCEDURE — U0003 INFECTIOUS AGENT DETECTION BY NUCLEIC ACID (DNA OR RNA); SEVERE ACUTE RESPIRATORY SYNDROME CORONAVIRUS 2 (SARS-COV-2) (CORONAVIRUS DISEASE [COVID-19]), AMPLIFIED PROBE TECHNIQUE, MAKING USE OF HIGH THROUGHPUT TECHNOLOGIES AS DESCRIBED BY CMS-2020-01-R: HCPCS | Performed by: INTERNAL MEDICINE

## 2020-09-01 PROCEDURE — C9803 HOPD COVID-19 SPEC COLLECT: HCPCS | Performed by: INTERNAL MEDICINE

## 2020-09-03 LAB
COVID LABCORP PRIORITY: NORMAL
SARS-COV-2 RNA RESP QL NAA+PROBE: NOT DETECTED

## 2020-09-04 ENCOUNTER — ANESTHESIA EVENT (OUTPATIENT)
Dept: GASTROENTEROLOGY | Facility: HOSPITAL | Age: 55
End: 2020-09-04
Payer: COMMERCIAL

## 2020-09-04 ENCOUNTER — HOSPITAL ENCOUNTER (OUTPATIENT)
Facility: HOSPITAL | Age: 55
Setting detail: HOSPITAL OUTPATIENT SURGERY
Discharge: HOME | End: 2020-09-04
Attending: INTERNAL MEDICINE | Admitting: INTERNAL MEDICINE
Payer: COMMERCIAL

## 2020-09-04 ENCOUNTER — TELEPHONE (OUTPATIENT)
Dept: GASTROENTEROLOGY | Facility: CLINIC | Age: 55
End: 2020-09-04

## 2020-09-04 ENCOUNTER — ANESTHESIA (OUTPATIENT)
Dept: GASTROENTEROLOGY | Facility: HOSPITAL | Age: 55
End: 2020-09-04
Payer: COMMERCIAL

## 2020-09-04 VITALS
RESPIRATION RATE: 15 BRPM | DIASTOLIC BLOOD PRESSURE: 69 MMHG | HEART RATE: 60 BPM | BODY MASS INDEX: 18.83 KG/M2 | OXYGEN SATURATION: 98 % | WEIGHT: 113 LBS | SYSTOLIC BLOOD PRESSURE: 127 MMHG | HEIGHT: 65 IN | TEMPERATURE: 97.1 F

## 2020-09-04 DIAGNOSIS — Z86.010 HISTORY OF COLON POLYPS: ICD-10-CM

## 2020-09-04 PROCEDURE — 45385 COLONOSCOPY W/LESION REMOVAL: CPT | Performed by: INTERNAL MEDICINE

## 2020-09-04 PROCEDURE — 25010000002 PROPOFOL 10 MG/ML EMULSION: Performed by: NURSE ANESTHETIST, CERTIFIED REGISTERED

## 2020-09-04 RX ORDER — LIDOCAINE HYDROCHLORIDE 10 MG/ML
0.5 INJECTION, SOLUTION EPIDURAL; INFILTRATION; INTRACAUDAL; PERINEURAL ONCE AS NEEDED
Status: CANCELLED | OUTPATIENT
Start: 2020-09-04

## 2020-09-04 RX ORDER — PROPOFOL 10 MG/ML
VIAL (ML) INTRAVENOUS AS NEEDED
Status: DISCONTINUED | OUTPATIENT
Start: 2020-09-04 | End: 2020-09-04 | Stop reason: SURG

## 2020-09-04 RX ORDER — SODIUM CHLORIDE 0.9 % (FLUSH) 0.9 %
10 SYRINGE (ML) INJECTION AS NEEDED
Status: DISCONTINUED | OUTPATIENT
Start: 2020-09-04 | End: 2020-09-04 | Stop reason: HOSPADM

## 2020-09-04 RX ORDER — SODIUM CHLORIDE 9 MG/ML
500 INJECTION, SOLUTION INTRAVENOUS CONTINUOUS PRN
Status: DISCONTINUED | OUTPATIENT
Start: 2020-09-04 | End: 2020-09-04 | Stop reason: HOSPADM

## 2020-09-04 RX ADMIN — LIDOCAINE HYDROCHLORIDE 50 MG: 20 INJECTION, SOLUTION INTRAVENOUS at 10:51

## 2020-09-04 RX ADMIN — PROPOFOL 50 MG: 10 INJECTION, EMULSION INTRAVENOUS at 10:51

## 2020-09-04 RX ADMIN — SODIUM CHLORIDE 500 ML: 9 INJECTION, SOLUTION INTRAVENOUS at 10:42

## 2020-09-04 RX ADMIN — PROPOFOL 50 MG: 10 INJECTION, EMULSION INTRAVENOUS at 10:58

## 2020-09-04 RX ADMIN — PROPOFOL 50 MG: 10 INJECTION, EMULSION INTRAVENOUS at 10:53

## 2020-09-04 RX ADMIN — PROPOFOL 50 MG: 10 INJECTION, EMULSION INTRAVENOUS at 10:55

## 2020-09-04 NOTE — ANESTHESIA POSTPROCEDURE EVALUATION
Patient: Jyoti Pleitez    Procedure Summary     Date:  09/04/20 Room / Location:  [facility] ENDOSCOPY 5 / [facility]    Anesthesia Start:  1050 Anesthesia Stop:  1104    Procedure:  COLONOSCOPY WITH ANESTHESIA (N/A ) Diagnosis:       History of colon polyps      (History of colon polyps [Z86.010])    Surgeon:  Cuco Cannon DO Provider:  Nilsa Whitfield CRNA    Anesthesia Type:  MAC ASA Status:  2          Anesthesia Type: MAC    Vitals  No vitals data found for the desired time range.          Post Anesthesia Care and Evaluation    Patient location during evaluation: PHASE II  Patient participation: complete - patient participated  Level of consciousness: awake and alert  Pain score: 0  Pain management: adequate  Airway patency: patent  Anesthetic complications: No anesthetic complications  PONV Status: none  Cardiovascular status: acceptable and stable  Respiratory status: acceptable  Hydration status: acceptable

## 2020-09-04 NOTE — ANESTHESIA PREPROCEDURE EVALUATION
Anesthesia Evaluation     NPO Solid Status: > 8 hours  NPO Liquid Status: > 8 hours           Airway   Mallampati: I  TM distance: >3 FB  No difficulty expected  Dental - normal exam     Pulmonary    Cardiovascular - normal exam  Exercise tolerance: excellent (>7 METS)        Neuro/Psych  (+) headaches,     GI/Hepatic/Renal/Endo      Musculoskeletal     (+) neck pain,   Abdominal    Substance History      OB/GYN          Other                        Anesthesia Plan    ASA 2     MAC     intravenous induction     Anesthetic plan, all risks, benefits, and alternatives have been provided, discussed and informed consent has been obtained with: patient.

## 2020-10-16 ENCOUNTER — TELEMEDICINE (OUTPATIENT)
Dept: OBSTETRICS AND GYNECOLOGY | Facility: CLINIC | Age: 55
End: 2020-10-16

## 2020-10-16 DIAGNOSIS — R68.82 LIBIDO, DECREASED: Primary | ICD-10-CM

## 2020-10-16 DIAGNOSIS — Z78.0 MENOPAUSE: ICD-10-CM

## 2020-10-16 PROCEDURE — 99213 OFFICE O/P EST LOW 20 MIN: CPT | Performed by: OBSTETRICS & GYNECOLOGY

## 2020-10-16 RX ORDER — MEDROXYPROGESTERONE ACETATE 2.5 MG/1
2.5 TABLET ORAL DAILY
Qty: 30 TABLET | Refills: 11 | Status: SHIPPED | OUTPATIENT
Start: 2020-10-16 | End: 2021-04-24 | Stop reason: SDUPTHER

## 2020-10-16 NOTE — PROGRESS NOTES
Subjective   No chief complaint on file.    Jyoti Pleitez is a 55 y.o. year old .  No LMP recorded. Patient has had a hysterectomy.  She presents, via telephone, to discuss hormone therapy.  Patient reports she is feeling well, and even feels like her stress level has been better recently.  Patient feels like her HRT is effective and wants to continue.  She is using both testosterone replacement and oxytocin nasal spray.      Patient having panic attacks several months ago, but that has resolved.  Patient feeling better.  She was trying to exercise but has already fallen off that habit again.    The following portions of the patient's history were reviewed and updated as appropriate:current medications and allergies    Social History    Tobacco Use      Smoking status: Former Smoker        Years: 14.00        Types: Cigarettes      Smokeless tobacco: Never Used    Review of Systems   Constitutional: Negative for activity change and unexpected weight change.   Respiratory: Negative for shortness of breath.    Cardiovascular: Negative for chest pain.   Genitourinary: Negative for pelvic pain.   Psychiatric/Behavioral: Negative for sleep disturbance. The patient is nervous/anxious (no panic attacks recently).          Objective   There were no vitals taken for this visit.    Physical Exam: N/A, telephone visit    Lab Review   No data reviewed    Imaging   No data reviewed     Assessment & Plan    Diagnoses and all orders for this visit:    1. Libido, decreased (Primary)    2. Menopause  Comments:  HRT refilled.  Script called in for compounded testosterone cream and oxytocin nasal spray.  Orders:  -     medroxyPROGESTERone (PROVERA) 2.5 MG tablet; Take 1 tablet by mouth Daily.  Dispense: 30 tablet; Refill: 11  3.   RTO in 3 months.    This note was electronically signed.    Rossy Georges MD  2020  08:58 CDT    Total time spent today with Jyoti  was 20 minutes (level 3).  Greater than 50% of the  time was spent coordinating care, answering her questions and counseling regarding pathophysiology of her presenting problem along with plans for any diagnositc work-up and treatment.

## 2021-01-15 ENCOUNTER — OFFICE VISIT (OUTPATIENT)
Dept: OBSTETRICS AND GYNECOLOGY | Facility: CLINIC | Age: 56
End: 2021-01-15

## 2021-01-15 VITALS
BODY MASS INDEX: 19.99 KG/M2 | HEIGHT: 65 IN | DIASTOLIC BLOOD PRESSURE: 60 MMHG | SYSTOLIC BLOOD PRESSURE: 100 MMHG | WEIGHT: 120 LBS

## 2021-01-15 DIAGNOSIS — G89.29 CHRONIC LOW BACK PAIN, UNSPECIFIED BACK PAIN LATERALITY, UNSPECIFIED WHETHER SCIATICA PRESENT: ICD-10-CM

## 2021-01-15 DIAGNOSIS — F41.9 ANXIETY: ICD-10-CM

## 2021-01-15 DIAGNOSIS — Z78.0 MENOPAUSE: Primary | ICD-10-CM

## 2021-01-15 DIAGNOSIS — R68.82 LIBIDO, DECREASED: ICD-10-CM

## 2021-01-15 DIAGNOSIS — M54.50 CHRONIC LOW BACK PAIN, UNSPECIFIED BACK PAIN LATERALITY, UNSPECIFIED WHETHER SCIATICA PRESENT: ICD-10-CM

## 2021-01-15 PROCEDURE — 99213 OFFICE O/P EST LOW 20 MIN: CPT | Performed by: OBSTETRICS & GYNECOLOGY

## 2021-01-15 RX ORDER — DESVENLAFAXINE SUCCINATE 50 MG/1
TABLET, EXTENDED RELEASE ORAL
COMMUNITY
Start: 2020-12-29 | End: 2021-04-23

## 2021-01-15 RX ORDER — LORAZEPAM 0.5 MG/1
TABLET ORAL
COMMUNITY
Start: 2020-12-29 | End: 2021-07-29

## 2021-01-15 NOTE — PROGRESS NOTES
"Subjective   Chief Complaint   Patient presents with   • hormones     pt here today for follow up on hormone meds. pt voices she is doing well on testosterone, oxytocin. provera, and estrace. pt voices no other cocnerns.        Jyoti Pleitez is a 55 y.o. year old .  No LMP recorded. Patient has had a hysterectomy.  She presents for follow-up to discuss hormone therapy.  Patient reports she is feeling well.  She states that her anxiety had really increased to the point that she just \"broke down\" and patient has now been on Pristiq and ativan (has only taken one) about three weeks ago.  She is resting better and feels like medication is definitely leveling out her mood; she is pleased.  Patient feels like her HRT is at a good level and wants to continue.  She is using both testosterone replacement and oxytocin nasal spray.      Patient having panic attacks several months ago, but that has resolved.  Patient feeling better.  She was trying to exercise but has already fallen off that habit again.    The following portions of the patient's history were reviewed and updated as appropriate:current medications and allergies    Social History    Tobacco Use      Smoking status: Former Smoker        Years: 14.00        Types: Cigarettes      Smokeless tobacco: Never Used    Review of Systems   Constitutional: Negative for activity change and unexpected weight change.   Respiratory: Negative for shortness of breath.    Cardiovascular: Negative for chest pain.   Genitourinary: Negative for pelvic pain.   Psychiatric/Behavioral: Negative for sleep disturbance. The patient is nervous/anxious (no panic attacks recently).          Objective   /60   Ht 165.1 cm (65\")   Wt 54.4 kg (120 lb)   BMI 19.97 kg/m²     Physical Exam  Vitals signs and nursing note reviewed.   Constitutional:       General: She is not in acute distress.     Appearance: She is well-developed.   HENT:      Head: Normocephalic and atraumatic. "   Neck:      Musculoskeletal: Normal range of motion.      Thyroid: No thyromegaly.   Pulmonary:      Effort: Pulmonary effort is normal.   Abdominal:      General: There is no distension.      Palpations: Abdomen is soft.      Tenderness: There is no abdominal tenderness.   Musculoskeletal: Normal range of motion.   Skin:     General: Skin is warm and dry.   Neurological:      Mental Status: She is alert and oriented to person, place, and time.   Psychiatric:         Behavior: Behavior normal.         Judgment: Judgment normal.         Lab Review   No data reviewed    Imaging   No data reviewed     Assessment & Plan    Diagnoses and all orders for this visit:    1. Libido, decreased (Primary): better on testosterone replacement.  Patient wants to continue.  Will check testosterone level again when she is here next time, which will also be an annual exam.    2. Menopause: Doing well on current dose of HRT  Comments:  HRT refilled.  Script called in for compounded testosterone cream and oxytocin nasal spray.    3. RTO in 3 months    4. Chronic low back pain, unspecified back pain laterality, unspecified whether sciatica present: encouraged heat with ibuprofen and tylenol.  OK to take ativan to help sleep even if it is due to back pain. (patient just exacerbated back pain last week while working in yard, she does not expect this to remain a problem)    5. BMI 20.0-20.9, adult    6. Anxiety: Patient has been on ativan and pristiq for past three weeks and feels like it is already helping significantly.  This is being managed by PCP        This note was electronically signed.    Rossy Georges MD  January 15, 2021  09:58 CST    Total time spent today with Jyoti  was 20 minutes (level 3).  Greater than 50% of the time was spent coordinating care, answering her questions and counseling regarding pathophysiology of her presenting problem along with plans for any diagnositc work-up and treatment.

## 2021-01-18 ENCOUNTER — TELEPHONE (OUTPATIENT)
Dept: OBSTETRICS AND GYNECOLOGY | Facility: CLINIC | Age: 56
End: 2021-01-18

## 2021-01-18 NOTE — TELEPHONE ENCOUNTER
----- Message from Rossy Georges MD sent at 1/15/2021  2:08 PM CST -----  Please call in refills of both current testosterone cream and oxytocin nasal spray X 3 months

## 2021-03-26 RX ORDER — ESTRADIOL 2 MG/1
2 TABLET ORAL DAILY
Qty: 30 TABLET | Refills: 11 | Status: SHIPPED | OUTPATIENT
Start: 2021-03-26 | End: 2021-04-24 | Stop reason: SDUPTHER

## 2021-04-04 ENCOUNTER — HOSPITAL ENCOUNTER (EMERGENCY)
Facility: HOSPITAL | Age: 56
Discharge: HOME | End: 2021-04-04
Attending: EMERGENCY MEDICINE | Admitting: EMERGENCY MEDICINE

## 2021-04-04 ENCOUNTER — APPOINTMENT (OUTPATIENT)
Dept: GENERAL RADIOLOGY | Facility: HOSPITAL | Age: 56
End: 2021-04-04

## 2021-04-04 ENCOUNTER — APPOINTMENT (OUTPATIENT)
Dept: CT IMAGING | Facility: HOSPITAL | Age: 56
End: 2021-04-04

## 2021-04-04 VITALS
TEMPERATURE: 99.3 F | SYSTOLIC BLOOD PRESSURE: 118 MMHG | HEART RATE: 107 BPM | BODY MASS INDEX: 19.56 KG/M2 | OXYGEN SATURATION: 95 % | WEIGHT: 117.38 LBS | RESPIRATION RATE: 18 BRPM | DIASTOLIC BLOOD PRESSURE: 84 MMHG | HEIGHT: 65 IN

## 2021-04-04 DIAGNOSIS — S06.0X0A CONCUSSION WITHOUT LOSS OF CONSCIOUSNESS, INITIAL ENCOUNTER: ICD-10-CM

## 2021-04-04 DIAGNOSIS — S05.12XA PERIORBITAL CONTUSION OF LEFT EYE, INITIAL ENCOUNTER: ICD-10-CM

## 2021-04-04 DIAGNOSIS — S00.03XA HEMATOMA OF SCALP, INITIAL ENCOUNTER: ICD-10-CM
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
## 2021-04-04 DIAGNOSIS — S80.02XA CONTUSION OF LEFT KNEE, INITIAL ENCOUNTER: ICD-10-CM

## 2021-04-04 DIAGNOSIS — S50.312A ABRASION OF LEFT ELBOW, INITIAL ENCOUNTER: ICD-10-CM

## 2021-04-04 DIAGNOSIS — S42.035A CLOSED NONDISPLACED FRACTURE OF ACROMIAL END OF LEFT CLAVICLE, INITIAL ENCOUNTER: ICD-10-CM

## 2021-04-04 DIAGNOSIS — S20.212A CONTUSION OF LEFT CHEST WALL, INITIAL ENCOUNTER: ICD-10-CM

## 2021-04-04 DIAGNOSIS — S00.01XA ABRASION OF SCALP, INITIAL ENCOUNTER: ICD-10-CM

## 2021-04-04 LAB
ABO GROUP BLD: NORMAL
ALBUMIN SERPL-MCNC: 4 G/DL (ref 3.5–5.2)
ALBUMIN/GLOB SERPL: 1.5 G/DL
ALP SERPL-CCNC: 74 U/L (ref 39–117)
ALT SERPL W P-5'-P-CCNC: 34 U/L (ref 1–33)
AMYLASE SERPL-CCNC: 73 U/L (ref 28–100)
ANION GAP SERPL CALCULATED.3IONS-SCNC: 15 MMOL/L (ref 5–15)
AST SERPL-CCNC: 46 U/L (ref 1–32)
BACTERIA UR QL AUTO: ABNORMAL /HPF
BASOPHILS # BLD AUTO: 0.07 10*3/MM3 (ref 0–0.2)
BASOPHILS NFR BLD AUTO: 0.7 % (ref 0–1.5)
BILIRUB SERPL-MCNC: 0.2 MG/DL (ref 0–1.2)
BILIRUB UR QL STRIP: NEGATIVE
BLD GP AB SCN SERPL QL: NEGATIVE
BUN SERPL-MCNC: 16 MG/DL (ref 6–20)
BUN/CREAT SERPL: 22.5 (ref 7–25)
CALCIUM SPEC-SCNC: 9.3 MG/DL (ref 8.6–10.5)
CHLORIDE SERPL-SCNC: 98 MMOL/L (ref 98–107)
CLARITY UR: CLEAR
CO2 SERPL-SCNC: 24 MMOL/L (ref 22–29)
COLOR UR: ABNORMAL
CREAT SERPL-MCNC: 0.71 MG/DL (ref 0.57–1)
DEPRECATED RDW RBC AUTO: 43.3 FL (ref 37–54)
EOSINOPHIL # BLD AUTO: 0.06 10*3/MM3 (ref 0–0.4)
EOSINOPHIL NFR BLD AUTO: 0.6 % (ref 0.3–6.2)
ERYTHROCYTE [DISTWIDTH] IN BLOOD BY AUTOMATED COUNT: 13.2 % (ref 12.3–15.4)
GFR SERPL CREATININE-BSD FRML MDRD: 86 ML/MIN/1.73
GLOBULIN UR ELPH-MCNC: 2.7 GM/DL
GLUCOSE SERPL-MCNC: 103 MG/DL (ref 65–99)
GLUCOSE UR STRIP-MCNC: NEGATIVE MG/DL
HCT VFR BLD AUTO: 33.7 % (ref 34–46.6)
HGB BLD-MCNC: 11.4 G/DL (ref 12–15.9)
HGB UR QL STRIP.AUTO: NEGATIVE
HOLD SPECIMEN: NORMAL
HOLD SPECIMEN: NORMAL
HYALINE CASTS UR QL AUTO: ABNORMAL /LPF
IMM GRANULOCYTES # BLD AUTO: 0.05 10*3/MM3 (ref 0–0.05)
IMM GRANULOCYTES NFR BLD AUTO: 0.5 % (ref 0–0.5)
INR PPP: 0.91 (ref 0.91–1.09)
KETONES UR QL STRIP: NEGATIVE
LEUKOCYTE ESTERASE UR QL STRIP.AUTO: ABNORMAL
LIPASE SERPL-CCNC: 52 U/L (ref 13–60)
LYMPHOCYTES # BLD AUTO: 4.09 10*3/MM3 (ref 0.7–3.1)
LYMPHOCYTES NFR BLD AUTO: 38.7 % (ref 19.6–45.3)
MCH RBC QN AUTO: 30.2 PG (ref 26.6–33)
MCHC RBC AUTO-ENTMCNC: 33.8 G/DL (ref 31.5–35.7)
MCV RBC AUTO: 89.4 FL (ref 79–97)
MONOCYTES # BLD AUTO: 0.63 10*3/MM3 (ref 0.1–0.9)
MONOCYTES NFR BLD AUTO: 6 % (ref 5–12)
NEUTROPHILS NFR BLD AUTO: 5.66 10*3/MM3 (ref 1.7–7)
NEUTROPHILS NFR BLD AUTO: 53.5 % (ref 42.7–76)
NITRITE UR QL STRIP: POSITIVE
NRBC BLD AUTO-RTO: 0 /100 WBC (ref 0–0.2)
PH UR STRIP.AUTO: 8 [PH] (ref 5–8)
PLATELET # BLD AUTO: 382 10*3/MM3 (ref 140–450)
PMV BLD AUTO: 10.8 FL (ref 6–12)
POTASSIUM SERPL-SCNC: 3.7 MMOL/L (ref 3.5–5.2)
PROT SERPL-MCNC: 6.7 G/DL (ref 6–8.5)
PROT UR QL STRIP: NEGATIVE
PROTHROMBIN TIME: 11.9 SECONDS (ref 11.9–14.6)
RBC # BLD AUTO: 3.77 10*6/MM3 (ref 3.77–5.28)
RBC # UR: ABNORMAL /HPF
REF LAB TEST METHOD: ABNORMAL
RH BLD: POSITIVE
SODIUM SERPL-SCNC: 137 MMOL/L (ref 136–145)
SP GR UR STRIP: 1.03 (ref 1–1.03)
SQUAMOUS #/AREA URNS HPF: ABNORMAL /HPF
T&S EXPIRATION DATE: NORMAL
UROBILINOGEN UR QL STRIP: ABNORMAL
WBC # BLD AUTO: 10.56 10*3/MM3 (ref 3.4–10.8)
WBC UR QL AUTO: ABNORMAL /HPF
WHOLE BLOOD HOLD SPECIMEN: NORMAL
WHOLE BLOOD HOLD SPECIMEN: NORMAL

## 2021-04-04 PROCEDURE — 73560 X-RAY EXAM OF KNEE 1 OR 2: CPT

## 2021-04-04 PROCEDURE — 96375 TX/PRO/DX INJ NEW DRUG ADDON: CPT

## 2021-04-04 PROCEDURE — 71045 X-RAY EXAM CHEST 1 VIEW: CPT

## 2021-04-04 PROCEDURE — 25010000002 ONDANSETRON PER 1 MG: Performed by: EMERGENCY MEDICINE

## 2021-04-04 PROCEDURE — 63710000001 ONDANSETRON ODT 4 MG TABLET DISPERSIBLE: Performed by: INTERNAL MEDICINE

## 2021-04-04 PROCEDURE — 96374 THER/PROPH/DIAG INJ IV PUSH: CPT

## 2021-04-04 PROCEDURE — 80053 COMPREHEN METABOLIC PANEL: CPT | Performed by: EMERGENCY MEDICINE

## 2021-04-04 PROCEDURE — 70450 CT HEAD/BRAIN W/O DYE: CPT

## 2021-04-04 PROCEDURE — 86901 BLOOD TYPING SEROLOGIC RH(D): CPT | Performed by: EMERGENCY MEDICINE

## 2021-04-04 PROCEDURE — 85025 COMPLETE CBC W/AUTO DIFF WBC: CPT | Performed by: EMERGENCY MEDICINE

## 2021-04-04 PROCEDURE — 96376 TX/PRO/DX INJ SAME DRUG ADON: CPT

## 2021-04-04 PROCEDURE — 81001 URINALYSIS AUTO W/SCOPE: CPT | Performed by: EMERGENCY MEDICINE

## 2021-04-04 PROCEDURE — 71260 CT THORAX DX C+: CPT

## 2021-04-04 PROCEDURE — 25010000002 IOPAMIDOL 61 % SOLUTION: Performed by: EMERGENCY MEDICINE

## 2021-04-04 PROCEDURE — 25010000003 HYDROMORPHONE 1 MG/ML SOLUTION: Performed by: EMERGENCY MEDICINE

## 2021-04-04 PROCEDURE — 72125 CT NECK SPINE W/O DYE: CPT

## 2021-04-04 PROCEDURE — 86900 BLOOD TYPING SEROLOGIC ABO: CPT | Performed by: EMERGENCY MEDICINE

## 2021-04-04 PROCEDURE — 99285 EMERGENCY DEPT VISIT HI MDM: CPT

## 2021-04-04 PROCEDURE — 73080 X-RAY EXAM OF ELBOW: CPT

## 2021-04-04 PROCEDURE — 74177 CT ABD & PELVIS W/CONTRAST: CPT

## 2021-04-04 PROCEDURE — 82150 ASSAY OF AMYLASE: CPT | Performed by: EMERGENCY MEDICINE

## 2021-04-04 PROCEDURE — 85610 PROTHROMBIN TIME: CPT | Performed by: EMERGENCY MEDICINE

## 2021-04-04 PROCEDURE — 83690 ASSAY OF LIPASE: CPT | Performed by: EMERGENCY MEDICINE

## 2021-04-04 PROCEDURE — 86850 RBC ANTIBODY SCREEN: CPT | Performed by: EMERGENCY MEDICINE

## 2021-04-04 RX ORDER — ONDANSETRON 2 MG/ML
4 INJECTION INTRAMUSCULAR; INTRAVENOUS ONCE
Status: COMPLETED | OUTPATIENT
Start: 2021-04-04 | End: 2021-04-04

## 2021-04-04 RX ORDER — ONDANSETRON 4 MG/1
4 TABLET, ORALLY DISINTEGRATING ORAL EVERY 8 HOURS PRN
Qty: 15 TABLET | Refills: 0 | Status: SHIPPED | OUTPATIENT
Start: 2021-04-04 | End: 2021-07-29

## 2021-04-04 RX ORDER — CYCLOBENZAPRINE HCL 5 MG
5 TABLET ORAL 3 TIMES DAILY PRN
Qty: 15 TABLET | Refills: 0 | Status: SHIPPED | OUTPATIENT
Start: 2021-04-04 | End: 2021-09-22

## 2021-04-04 RX ORDER — LIDOCAINE HYDROCHLORIDE 20 MG/ML
10 INJECTION, SOLUTION INFILTRATION; PERINEURAL ONCE
Status: COMPLETED | OUTPATIENT
Start: 2021-04-04 | End: 2021-04-04

## 2021-04-04 RX ORDER — ONDANSETRON 4 MG/1
4 TABLET, ORALLY DISINTEGRATING ORAL ONCE
Status: COMPLETED | OUTPATIENT
Start: 2021-04-04 | End: 2021-04-04

## 2021-04-04 RX ORDER — SODIUM CHLORIDE 0.9 % (FLUSH) 0.9 %
10 SYRINGE (ML) INJECTION AS NEEDED
Status: DISCONTINUED | OUTPATIENT
Start: 2021-04-04 | End: 2021-04-04 | Stop reason: HOSPADM

## 2021-04-04 RX ORDER — HYDROCODONE BITARTRATE AND ACETAMINOPHEN 7.5; 325 MG/1; MG/1
1 TABLET ORAL EVERY 6 HOURS PRN
Qty: 12 TABLET | Refills: 0 | Status: SHIPPED | OUTPATIENT
Start: 2021-04-04 | End: 2021-07-29

## 2021-04-04 RX ADMIN — HYDROMORPHONE HYDROCHLORIDE 1 MG: 1 INJECTION, SOLUTION INTRAMUSCULAR; INTRAVENOUS; SUBCUTANEOUS at 17:43

## 2021-04-04 RX ADMIN — LIDOCAINE HYDROCHLORIDE 10 ML: 20 INJECTION, SOLUTION INFILTRATION; PERINEURAL at 20:17

## 2021-04-04 RX ADMIN — IOPAMIDOL 100 ML: 612 INJECTION, SOLUTION INTRAVENOUS at 17:56

## 2021-04-04 RX ADMIN — HYDROMORPHONE HYDROCHLORIDE 1 MG: 1 INJECTION, SOLUTION INTRAMUSCULAR; INTRAVENOUS; SUBCUTANEOUS at 20:02

## 2021-04-04 RX ADMIN — SODIUM CHLORIDE, POTASSIUM CHLORIDE, SODIUM LACTATE AND CALCIUM CHLORIDE 1000 ML: 600; 310; 30; 20 INJECTION, SOLUTION INTRAVENOUS at 17:43

## 2021-04-04 RX ADMIN — ONDANSETRON 4 MG: 4 TABLET, ORALLY DISINTEGRATING ORAL at 21:05

## 2021-04-04 RX ADMIN — ONDANSETRON HYDROCHLORIDE 4 MG: 2 SOLUTION INTRAMUSCULAR; INTRAVENOUS at 20:02

## 2021-04-04 RX ADMIN — ONDANSETRON HYDROCHLORIDE 4 MG: 2 SOLUTION INTRAMUSCULAR; INTRAVENOUS at 17:43

## 2021-04-04 NOTE — ED NOTES
FAST exam performed by Dr. Cid at bedside. Negative findings per Dr. Cid.      Miguelangel Mary, RN  04/04/21 1800
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
## 2021-04-05 ENCOUNTER — EPISODE CHANGES (OUTPATIENT)
Dept: CASE MANAGEMENT | Facility: OTHER | Age: 56
End: 2021-04-05

## 2021-04-23 ENCOUNTER — OFFICE VISIT (OUTPATIENT)
Dept: OBSTETRICS AND GYNECOLOGY | Facility: CLINIC | Age: 56
End: 2021-04-23

## 2021-04-23 VITALS
WEIGHT: 121 LBS | HEIGHT: 65 IN | SYSTOLIC BLOOD PRESSURE: 116 MMHG | DIASTOLIC BLOOD PRESSURE: 64 MMHG | BODY MASS INDEX: 20.16 KG/M2

## 2021-04-23 DIAGNOSIS — Z01.411 ENCOUNTER FOR GYNECOLOGICAL EXAMINATION WITH ABNORMAL FINDING: Primary | ICD-10-CM

## 2021-04-23 DIAGNOSIS — Z87.891 FORMER SMOKER: ICD-10-CM

## 2021-04-23 DIAGNOSIS — R68.82 LIBIDO, DECREASED: ICD-10-CM

## 2021-04-23 DIAGNOSIS — Z12.31 BREAST CANCER SCREENING BY MAMMOGRAM: ICD-10-CM

## 2021-04-23 DIAGNOSIS — Z78.0 MENOPAUSE: ICD-10-CM

## 2021-04-23 PROCEDURE — 99396 PREV VISIT EST AGE 40-64: CPT | Performed by: OBSTETRICS & GYNECOLOGY
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
## 2021-04-24 LAB — TESTOST SERPL-MCNC: 16 NG/DL (ref 3–41)

## 2021-04-24 RX ORDER — MEDROXYPROGESTERONE ACETATE 2.5 MG/1
2.5 TABLET ORAL DAILY
Qty: 30 TABLET | Refills: 11 | Status: SHIPPED | OUTPATIENT
Start: 2021-04-24 | End: 2021-07-29

## 2021-04-24 RX ORDER — ESTRADIOL 2 MG/1
2 TABLET ORAL DAILY
Qty: 30 TABLET | Refills: 11 | Status: SHIPPED | OUTPATIENT
Start: 2021-04-24 | End: 2021-07-29

## 2021-04-30 ENCOUNTER — TELEPHONE (OUTPATIENT)
Dept: OBSTETRICS AND GYNECOLOGY | Facility: CLINIC | Age: 56
End: 2021-04-30

## 2021-04-30 NOTE — TELEPHONE ENCOUNTER
Phoned in [facility] recommendation for meds based on labs. 50:50 Biest 0.8mg/progesterone 40mg/ Testosterone 0.2mg/oxytocin 20u per 0.25ml    Per savells 2 additional refills. BS

## 2021-06-19 ENCOUNTER — APPOINTMENT (OUTPATIENT)
Dept: CT IMAGING | Facility: HOSPITAL | Age: 56
End: 2021-06-19

## 2021-06-19 ENCOUNTER — HOSPITAL ENCOUNTER (EMERGENCY)
Facility: HOSPITAL | Age: 56
Discharge: HOME | End: 2021-06-19
Attending: EMERGENCY MEDICINE | Admitting: EMERGENCY MEDICINE

## 2021-06-19 VITALS
OXYGEN SATURATION: 97 % | SYSTOLIC BLOOD PRESSURE: 129 MMHG | DIASTOLIC BLOOD PRESSURE: 74 MMHG | BODY MASS INDEX: 19.66 KG/M2 | TEMPERATURE: 97.7 F | HEART RATE: 80 BPM | WEIGHT: 118 LBS | RESPIRATION RATE: 18 BRPM | HEIGHT: 65 IN

## 2021-06-19 DIAGNOSIS — S00.01XA ABRASION OF SCALP, INITIAL ENCOUNTER: ICD-10-CM

## 2021-06-19 DIAGNOSIS — S09.90XA INJURY OF HEAD, INITIAL ENCOUNTER: Primary | ICD-10-CM

## 2021-06-19 PROCEDURE — 70450 CT HEAD/BRAIN W/O DYE: CPT

## 2021-06-19 PROCEDURE — 99283 EMERGENCY DEPT VISIT LOW MDM: CPT

## 2021-06-20 ENCOUNTER — HOSPITAL ENCOUNTER (EMERGENCY)
Facility: HOSPITAL | Age: 56
Discharge: HOME | End: 2021-06-20
Admitting: EMERGENCY MEDICINE

## 2021-06-20 ENCOUNTER — APPOINTMENT (OUTPATIENT)
Dept: CT IMAGING | Facility: HOSPITAL | Age: 56
End: 2021-06-20

## 2021-06-20 VITALS
RESPIRATION RATE: 16 BRPM | SYSTOLIC BLOOD PRESSURE: 106 MMHG | OXYGEN SATURATION: 97 % | TEMPERATURE: 98 F | WEIGHT: 118 LBS | BODY MASS INDEX: 19.66 KG/M2 | HEART RATE: 68 BPM | DIASTOLIC BLOOD PRESSURE: 57 MMHG | HEIGHT: 65 IN

## 2021-06-20 DIAGNOSIS — S09.90XD CLOSED HEAD INJURY, SUBSEQUENT ENCOUNTER: ICD-10-CM

## 2021-06-20 DIAGNOSIS — R93.0 ABNORMAL CT OF THE HEAD: Primary | ICD-10-CM

## 2021-06-20 PROCEDURE — 70450 CT HEAD/BRAIN W/O DYE: CPT

## 2021-06-20 PROCEDURE — 99282 EMERGENCY DEPT VISIT SF MDM: CPT

## 2021-06-20 NOTE — DISCHARGE INSTRUCTIONS
Drink plenty of fluid.  Rest.  Follow closed head injury instruction sheet.  Follow up with Dr Mcneill, neurosurgeon on call - call his office tomorrow for appointment. Follow  up with PCP - call tomorrow for appointment. Return to ED if condition does not improve or worsens
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
## 2021-06-20 NOTE — ED PROVIDER NOTES
Subjective   56 yof presents for repeat CT scan of the head.  [place] and [place] on rocks and fell hitting her head.  She was seen here last night and had a CT scan of the head.  It was read negative by [facility] but during an overread by our radiology department, there was a concern she might have a small head bleed.  It was recommended to have a repeat CT scan of the head.  She states she 'feels tired' but otherwise has no complaints.  She is alert and oriented x 3.  She is neurologically intact.            Review of Systems   Constitutional: Negative for activity change, appetite change, fatigue and fever.   HENT: Negative for congestion, ear pain, facial swelling and sore throat.    Eyes: Negative for discharge and visual disturbance.   Respiratory: Negative for apnea, chest tightness, shortness of breath, wheezing and stridor.    Cardiovascular: Negative for chest pain and palpitations.   Gastrointestinal: Negative for abdominal distention, abdominal pain, diarrhea, nausea and vomiting.   Genitourinary: Negative for difficulty urinating and dysuria.   Musculoskeletal: Negative for arthralgias and myalgias.   Skin: Negative for rash and wound.   Neurological: Negative for dizziness and seizures.   Psychiatric/Behavioral: Negative for agitation and confusion.       Past Medical History:   Diagnosis Date   • Back problem    • GERD (gastroesophageal reflux disease)    • Low back pain    • Neck pain    • Varicose vein of leg        Allergies   Allergen Reactions   • Sulfa Antibiotics Hives and Swelling       Past Surgical History:   Procedure Laterality Date   • AUGMENTATION MAMMAPLASTY  1997   • BREAST AUGMENTATION     • COLONOSCOPY N/A 9/4/2020    Procedure: COLONOSCOPY WITH ANESTHESIA;  Surgeon: Cuco Cannon DO;  Location: [facility] ENDOSCOPY;  Service: Gastroenterology;  Laterality: N/A;  preop; hx colon polyps  postop;  Romaine Lawrence MD   • FOOT SURGERY  Bilateral    • HYSTERECTOMY      BIB [facility] for uterine fibroids in 2008 by Dr Acsota   • OOPHORECTOMY     • TONSILLECTOMY     • WISDOM TOOTH EXTRACTION         Family History   Problem Relation Age of Onset   • Lung cancer Mother    • Coronary artery disease Father    • Hypertension Father    • Prostate cancer Father    • Coronary artery disease Brother    • Coronary artery disease Maternal Grandfather    • Colon cancer Cousin    • Breast cancer Maternal Aunt    • No Known Problems Sister    • No Known Problems Daughter    • No Known Problems Son    • No Known Problems Maternal Grandmother    • No Known Problems Paternal Grandmother    • No Known Problems Paternal Aunt    • No Known Problems Other    • BRCA 1/2 Neg Hx    • Endometrial cancer Neg Hx    • Ovarian cancer Neg Hx    • Uterine cancer Neg Hx        Social History     Socioeconomic History   • Marital status:      Spouse name: Not on file   • Number of children: Not on file   • Years of education: Not on file   • Highest education level: Not on file   Tobacco Use   • Smoking status: Former Smoker     Years: 14.00     Types: Cigarettes   • Smokeless tobacco: Never Used   Substance and Sexual Activity   • Alcohol use: Yes     Alcohol/week: 1.0 standard drinks     Types: 1 Glasses of wine per week     Comment: socially   • Drug use: No   • Sexual activity: Never           Objective   Physical Exam  Constitutional:       Appearance: Normal appearance.   HENT:      Head:        Comments: Wound present to the right parietal area.  No active bleeding is present   Cardiovascular:      Rate and Rhythm: Normal rate.   Pulmonary:      Effort: Pulmonary effort is normal. No respiratory distress.   Skin:     General: Skin is warm and dry.   Neurological:      General: No focal deficit present.      Mental Status: She is alert and oriented to person, place, and time.      Motor: No weakness.      Gait: Gait normal.      Comments: She is neurologically intact  "        Procedures          No current facility-administered medications for this encounter.    Current Outpatient Medications:   •  cyclobenzaprine (FLEXERIL) 5 MG tablet, Take 1 tablet by mouth 3 (Three) Times a Day As Needed for Muscle Spasms., Disp: 15 tablet, Rfl: 0  •  diclofenac (VOLTAREN) 75 MG EC tablet, Take 1 tablet by mouth daily, Disp: 30 tablet, Rfl: 0  •  estradiol (ESTRACE) 2 MG tablet, Take 1 tablet by mouth Daily., Disp: 30 tablet, Rfl: 11  •  HYDROcodone-acetaminophen (NORCO) 7.5-325 MG per tablet, Take 1 tablet by mouth Every 6 (Six) Hours As Needed for Moderate Pain ., Disp: 12 tablet, Rfl: 0  •  LORazepam (ATIVAN) 0.5 MG tablet, , Disp: , Rfl:   •  medroxyPROGESTERone (PROVERA) 2.5 MG tablet, Take 1 tablet by mouth Daily., Disp: 30 tablet, Rfl: 11  •  NON FORMULARY, 1 application every night at bedtime. 1% testosterone cream, Disp: , Rfl:   •  NON FORMULARY, Oxytocin nasal spray, Disp: , Rfl:   •  ondansetron ODT (ZOFRAN-ODT) 4 MG disintegrating tablet, Place 1 tablet on the tongue Every 8 (Eight) Hours As Needed for Nausea or Vomiting., Disp: 15 tablet, Rfl: 0    Vital signs:  /57   Pulse 68   Temp 98 °F (36.7 °C)   Resp 16   Ht 165.1 cm (65\")   Wt 53.5 kg (118 lb)   SpO2 97%   BMI 19.64 kg/m²        ED LAB RESULTS:   Lab Results (last 24 hours)     ** No results found for the last 24 hours. **             IMAGING RESULTS  CT Head Without Contrast   Final Result   Questionable linear hyperdense area in the left parieto-occipital region   is less conspicuous on the current exam but persists. Subarachnoid   hemorrhage not excluded.       This report was finalized on 06/20/2021 09:21 by Dr. Edmar Reed MD.                       ED Course  ED Course as of Jun 20 1403   Sun Jun 20, 2021   0927 CT scan reviewed with Dr Curry.  Call placed to Dr Mcneill, neurosurgeon, on call.     [KS]   0958 Went and discussed this case with the patient she is awake alert oriented x3 with a GCS of " 15/15 she has some neck problem i.e. pain but has been having on and off I have offered a CT of the cervical spine her physical examination cervical spine is negative for any step-offs laxity or any paravertebral tenderness or spasm the patient does not want a cervical spine CT to be performed at this time we are waiting on neurosurgical input    [TS]   7797 I spoke with Dr Mcneill, neurosurgeon.  He states although it is possible a hemorrhage is present, it appears to be improving.  She is also neurologically intact. He states she can be d'cd home to f/u with him. She is to call their office tomorrow for appointment. The patient and her family voice understanding of both results and instructions including head injury instructions.    [KS]      ED Course User Index  [KS] Manjit Fox APRN  [TS] Cliff Curry MD                                           MDM  Number of Diagnoses or Management Options  Abnormal CT of the head: minor  Closed head injury, subsequent encounter: minor     Amount and/or Complexity of Data Reviewed  Tests in the radiology section of CPT®: ordered and reviewed  Discuss the patient with other providers: yes    Risk of Complications, Morbidity, and/or Mortality  Presenting problems: low  Diagnostic procedures: minimal  Management options: low    Patient Progress  Patient progress: stable      Final diagnoses:   Abnormal CT of the head   Closed head injury, subsequent encounter       ED Disposition  ED Disposition     ED Disposition Condition Comment    Discharge Stable           Haroon Mcneill MD  [address]  [facility] [address]  [facility] [address]  [phone]    Schedule an appointment as soon as possible for a visit in 1 day  Routine ED follow up    Romaine Lawrence MD  [address] [facility] [address]  [phone]    Schedule an appointment as soon as possible for a visit in 1 day  Routine ED follow up         Medication List      No changes were made to your prescriptions  during this visit.          Shoulders, Manjit Nuñez, APRN  06/20/21 6698

## 2021-06-20 NOTE — ED PROVIDER NOTES
Subjective   History of Present Illness    Review of Systems    Past Medical History:   Diagnosis Date   • Back problem    • GERD (gastroesophageal reflux disease)    • Low back pain    • Neck pain    • Varicose vein of leg        Allergies   Allergen Reactions   • Sulfa Antibiotics Hives and Swelling       Past Surgical History:   Procedure Laterality Date   • AUGMENTATION MAMMAPLASTY  1997   • BREAST AUGMENTATION     • COLONOSCOPY N/A 9/4/2020    Procedure: COLONOSCOPY WITH ANESTHESIA;  Surgeon: Cuco Cannon DO;  Location: [facility] ENDOSCOPY;  Service: Gastroenterology;  Laterality: N/A;  preop; hx colon polyps  postop;  Romaine Lawrence MD   • FOOT SURGERY Bilateral    • HYSTERECTOMY      BIB [facility] for uterine fibroids in 2008 by Dr Acosta   • OOPHORECTOMY     • TONSILLECTOMY     • WISDOM TOOTH EXTRACTION         Family History   Problem Relation Age of Onset   • Lung cancer Mother    • Coronary artery disease Father    • Hypertension Father    • Prostate cancer Father    • Coronary artery disease Brother    • Coronary artery disease Maternal Grandfather    • Colon cancer Cousin    • Breast cancer Maternal Aunt    • No Known Problems Sister    • No Known Problems Daughter    • No Known Problems Son    • No Known Problems Maternal Grandmother    • No Known Problems Paternal Grandmother    • No Known Problems Paternal Aunt    • No Known Problems Other    • BRCA 1/2 Neg Hx    • Endometrial cancer Neg Hx    • Ovarian cancer Neg Hx    • Uterine cancer Neg Hx        Social History     Socioeconomic History   • Marital status:      Spouse name: Not on file   • Number of children: Not on file   • Years of education: Not on file   • Highest education level: Not on file   Tobacco Use   • Smoking status: Former Smoker     Years: 14.00     Types: Cigarettes   • Smokeless tobacco: Never Used   Substance and Sexual Activity   • Alcohol use: Yes     Alcohol/week: 1.0 standard drinks     Types: 1 Glasses of wine per  week     Comment: socially   • Drug use: No   • Sexual activity: Never           Objective   Physical Exam    Procedures           ED Course  ED Course as of Jun 20 0655   Sun Jun 20, 2021   0406 I told the patient her CT head is okay.  She does have a superficial skin tear laceration in the right parietal scalp but it did not be separate much.  I offered to try to put some staples under to close little better or she could sleep longer and heal by itself and she decided to do that.  She is discharged in stable condition.    [TR]   0654 I was called by radiology Dr. Reed regarding this patient's CAT scan CT head was read as negative yesterday but she believes that there may be a small subtle occipital subarachnoid hemorrhage I have called the patient at home discussed this with the patient and her  and recommended for them to come back to the ER as soon as possible so we can reimage and if needed get a neurosurgical consultation    [TS]      ED Course User Index  [TR] Haroon Norwood Jr., MD  [TS] Cliff Curry MD                                           [facility]    Final diagnoses:   Injury of head, initial encounter   Abrasion of scalp, initial encounter       ED Disposition  ED Disposition     ED Disposition Condition Comment    Discharge Stable           Romaine Lawrence MD  [address] [facility] [address]  [phone]      If symptoms worsen         Medication List      No changes were made to your prescriptions during this visit.          Cliff Curry MD  06/20/21 0603

## 2021-06-28 ENCOUNTER — PATIENT OUTREACH (OUTPATIENT)
Dept: CASE MANAGEMENT | Facility: OTHER | Age: 56
End: 2021-06-28

## 2021-06-28 ENCOUNTER — TELEPHONE (OUTPATIENT)
Dept: NEUROSURGERY | Facility: CLINIC | Age: 56
End: 2021-06-28
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
## 2021-06-28 NOTE — TELEPHONE ENCOUNTER
Caller: TALYA ESTEBAN    Relationship to patient: SELF    Best call back number: [phone]    Chief complaint: HOSPITAL F/U    Type of visit: HOSPITAL F/U    Requested date: AS SOON AS POSSIBLE    If rescheduling, when is the original appointment:      Additional notes: PT TALYA JAMARCUSKAY WAS CALLING TO SCHEDULE HER HOSPITAL F/U.  PT WAS SEEN IN THE ED 06/20/21, 06/19/21 AND 04/04/2021.  PTS LAST ED NOTE STATED DR. STAHL WAS CONSULTED AND WAS ASKED TO F/U WITH HIM IN THE OFFICE WITHIN A WEEK.     PLEASE CALL PT  THANK YOU

## 2021-06-28 NOTE — TELEPHONE ENCOUNTER
I sent this message to [facility] medical asst, to help with finding an appt for this patient.    roge lozano CMA

## 2021-06-28 NOTE — TELEPHONE ENCOUNTER
THE FIRST AVAILABLE APPT WOULD BE AUGUST 3/2021.  JUST WANTED TO MAKE SURE THIS APPT WOULD BE OKAY BECAUSE IT WAS OUTSIDE OF THE WINDOW ON THE ED NOTE.

## 2021-07-06 ENCOUNTER — OFFICE VISIT (OUTPATIENT)
Dept: NEUROSURGERY | Facility: CLINIC | Age: 56
End: 2021-07-06

## 2021-07-06 VITALS
HEIGHT: 65 IN | WEIGHT: 121 LBS | SYSTOLIC BLOOD PRESSURE: 105 MMHG | DIASTOLIC BLOOD PRESSURE: 68 MMHG | BODY MASS INDEX: 20.16 KG/M2

## 2021-07-06 DIAGNOSIS — Z78.9 CURRENT NON-SMOKER: ICD-10-CM

## 2021-07-06 DIAGNOSIS — S06.0X0A CONCUSSION WITHOUT LOSS OF CONSCIOUSNESS, INITIAL ENCOUNTER: Primary | ICD-10-CM

## 2021-07-06 PROCEDURE — 99214 OFFICE O/P EST MOD 30 MIN: CPT | Performed by: NURSE PRACTITIONER
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
## 2021-07-16 ENCOUNTER — HOSPITAL ENCOUNTER (OUTPATIENT)
Dept: MAMMOGRAPHY | Facility: HOSPITAL | Age: 56
Discharge: HOME | End: 2021-07-16
Admitting: OBSTETRICS & GYNECOLOGY

## 2021-07-16 DIAGNOSIS — Z78.0 MENOPAUSE: ICD-10-CM

## 2021-07-16 PROCEDURE — 77063 BREAST TOMOSYNTHESIS BI: CPT

## 2021-07-16 PROCEDURE — 77067 SCR MAMMO BI INCL CAD: CPT

## 2021-07-20 ENCOUNTER — PATIENT OUTREACH (OUTPATIENT)
Dept: CASE MANAGEMENT | Facility: OTHER | Age: 56
End: 2021-07-20

## 2021-07-20 NOTE — OUTREACH NOTE
Ambulatory Case Management Note      Care Plan: Medication Regimen   Updates made since 7/20/2021 12:00 AM      Problem: MEDICATION ADHERENCE       Goal: Consistently take medications as prescribed       Task: Discuss barriers to medication adherence with patient    Due Date: 1/21/2022   Responsible User: Valerie Malik, RN   Note:    Verify that Jyoti is taking medications as prescribed.           Jyoti is  to a specialty coordinator at [employer].  I engaged in 6/2021after she reported to the ED after falling on some rocks while walking in flip flops and hit her head.  She required stiches and a CT was completed.  The MD from the ED contacted her at home and advised her that the results of her CT showed a small occipital subarachnoid hemorrhage.  I contacted her at that time to verify that she had made a follow up appointment with Dr Haroon Mcneill as advised by the ed md.    She made an appt with Dr Mcneill's office after our conversation and she was told to rest for 2 weeks and was advised she was still suffering from a concussion.  Jyoti stated she was unable to take time off work and continued to work but cut her hours to part time.  She states she is still experiencing fatigue.  She refuses covid vaccine.  I educated on importance of receiving vaccine.      She has been newly prescribed a statin medication and is taking without issue.  She has a 22 year old son who has moved back home and she cares for her elderly parents.  She feels stressed at times.  I have sent a text with the number for EAP and encouraged its use.  Jyoti was asking how likely it is that she could have fatty liver.  I educated on what fatty liver is and what causes it and what labs can indicate a diagnosis of fatty liver.      She has completed her annual physical and mammogram.       Educated on availability of nurseline and its use.  All basic needs are met. No issue with social determinants.  No inabilities to obtain food or medications or  transportation to MD appointments. Educated on participating in habits that prevent the spread of COVID virus with home & work hygiene. Patient verbalizes understanding.  Educated patient on benefits of Employee CM program and invited to call with any new needs.     Valerie Malik, CHRISTO  Ambulatory Case Management    7/20/2021, 12:38 EDT  Valerie SOOD, RN, [facility]   [place] [address]     [phone] cell   [phone] office  [phone] fax  [email or web address]

## 2021-07-22 ENCOUNTER — TELEPHONE (OUTPATIENT)
Dept: NEUROSURGERY | Facility: CLINIC | Age: 56
End: 2021-07-22

## 2021-07-22 NOTE — TELEPHONE ENCOUNTER
PT CALLED TO CANCEL APT ON 7/29/2021 DUE TO HAVING CLAVICAL SURGERY FIRST WEEK OF AUGUST. SHE WILL CALL BACK TO RESCHEDULED AFTER SURGERY.

## 2021-07-26 ENCOUNTER — TRANSCRIBE ORDERS (OUTPATIENT)
Dept: LAB | Facility: HOSPITAL | Age: 56
End: 2021-07-26

## 2021-07-26 DIAGNOSIS — Z11.59 SCREENING FOR VIRAL DISEASE: Primary | ICD-10-CM

## 2021-07-29 ENCOUNTER — PRE-ADMISSION TESTING (OUTPATIENT)
Dept: PREADMISSION TESTING | Facility: HOSPITAL | Age: 56
End: 2021-07-29

## 2021-07-29 VITALS
HEIGHT: 65 IN | BODY MASS INDEX: 19.91 KG/M2 | HEART RATE: 79 BPM | SYSTOLIC BLOOD PRESSURE: 115 MMHG | WEIGHT: 119.49 LBS | RESPIRATION RATE: 18 BRPM | OXYGEN SATURATION: 100 % | DIASTOLIC BLOOD PRESSURE: 74 MMHG

## 2021-07-29 LAB
ANION GAP SERPL CALCULATED.3IONS-SCNC: 10 MMOL/L (ref 5–15)
BUN SERPL-MCNC: 12 MG/DL (ref 6–20)
BUN/CREAT SERPL: 19.4 (ref 7–25)
CALCIUM SPEC-SCNC: 9.6 MG/DL (ref 8.6–10.5)
CHLORIDE SERPL-SCNC: 102 MMOL/L (ref 98–107)
CO2 SERPL-SCNC: 27 MMOL/L (ref 22–29)
CREAT SERPL-MCNC: 0.62 MG/DL (ref 0.57–1)
DEPRECATED RDW RBC AUTO: 45.8 FL (ref 37–54)
ERYTHROCYTE [DISTWIDTH] IN BLOOD BY AUTOMATED COUNT: 13.1 % (ref 12.3–15.4)
GFR SERPL CREATININE-BSD FRML MDRD: 100 ML/MIN/1.73
GLUCOSE SERPL-MCNC: 101 MG/DL (ref 65–99)
HCT VFR BLD AUTO: 41.8 % (ref 34–46.6)
HGB BLD-MCNC: 13.3 G/DL (ref 12–15.9)
MCH RBC QN AUTO: 30.1 PG (ref 26.6–33)
MCHC RBC AUTO-ENTMCNC: 31.8 G/DL (ref 31.5–35.7)
MCV RBC AUTO: 94.6 FL (ref 79–97)
PLATELET # BLD AUTO: 315 10*3/MM3 (ref 140–450)
PMV BLD AUTO: 11.2 FL (ref 6–12)
POTASSIUM SERPL-SCNC: 4.3 MMOL/L (ref 3.5–5.2)
RBC # BLD AUTO: 4.42 10*6/MM3 (ref 3.77–5.28)
SODIUM SERPL-SCNC: 139 MMOL/L (ref 136–145)
WBC # BLD AUTO: 5.58 10*3/MM3 (ref 3.4–10.8)

## 2021-07-29 PROCEDURE — 93010 ELECTROCARDIOGRAM REPORT: CPT | Performed by: INTERNAL MEDICINE

## 2021-07-29 PROCEDURE — 85027 COMPLETE CBC AUTOMATED: CPT

## 2021-07-29 PROCEDURE — 36415 COLL VENOUS BLD VENIPUNCTURE: CPT

## 2021-07-29 PROCEDURE — 93005 ELECTROCARDIOGRAM TRACING: CPT

## 2021-07-29 PROCEDURE — 80048 BASIC METABOLIC PNL TOTAL CA: CPT

## 2021-07-31 ENCOUNTER — LAB (OUTPATIENT)
Dept: LAB | Facility: HOSPITAL | Age: 56
End: 2021-07-31

## 2021-07-31 LAB
QT INTERVAL: 414 MS
QTC INTERVAL: 447 MS
SARS-COV-2 ORF1AB RESP QL NAA+PROBE: NOT DETECTED

## 2021-07-31 PROCEDURE — U0004 COV-19 TEST NON-CDC HGH THRU: HCPCS | Performed by: ORTHOPAEDIC SURGERY

## 2021-07-31 PROCEDURE — C9803 HOPD COVID-19 SPEC COLLECT: HCPCS | Performed by: ORTHOPAEDIC SURGERY

## 2021-08-01 PROBLEM — S42.032K: Status: ACTIVE | Noted: 2021-08-01
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
## 2021-08-03 ENCOUNTER — ANESTHESIA EVENT (OUTPATIENT)
Dept: PERIOP | Facility: HOSPITAL | Age: 56
End: 2021-08-03

## 2021-08-03 ENCOUNTER — ANESTHESIA (OUTPATIENT)
Dept: PERIOP | Facility: HOSPITAL | Age: 56
End: 2021-08-03

## 2021-08-03 ENCOUNTER — APPOINTMENT (OUTPATIENT)
Dept: GENERAL RADIOLOGY | Facility: HOSPITAL | Age: 56
End: 2021-08-03

## 2021-08-03 ENCOUNTER — HOSPITAL ENCOUNTER (OUTPATIENT)
Facility: HOSPITAL | Age: 56
Setting detail: HOSPITAL OUTPATIENT SURGERY
Discharge: HOME | End: 2021-08-03
Attending: ORTHOPAEDIC SURGERY | Admitting: ORTHOPAEDIC SURGERY

## 2021-08-03 VITALS
SYSTOLIC BLOOD PRESSURE: 113 MMHG | HEART RATE: 75 BPM | TEMPERATURE: 97 F | DIASTOLIC BLOOD PRESSURE: 62 MMHG | OXYGEN SATURATION: 100 % | RESPIRATION RATE: 16 BRPM

## 2021-08-03 DIAGNOSIS — S42.032K CLOSED DISPLACED FRACTURE OF ACROMIAL END OF LEFT CLAVICLE WITH NONUNION: Primary | ICD-10-CM

## 2021-08-03 PROCEDURE — C1713 ANCHOR/SCREW BN/BN,TIS/BN: HCPCS | Performed by: ORTHOPAEDIC SURGERY

## 2021-08-03 PROCEDURE — 25010000002 DEXAMETHASONE PER 1 MG: Performed by: ANESTHESIOLOGY

## 2021-08-03 PROCEDURE — 76000 FLUOROSCOPY <1 HR PHYS/QHP: CPT

## 2021-08-03 PROCEDURE — 25010000002 ONDANSETRON PER 1 MG: Performed by: NURSE ANESTHETIST, CERTIFIED REGISTERED

## 2021-08-03 PROCEDURE — 25010000002 DEXAMETHASONE PER 1 MG: Performed by: NURSE ANESTHETIST, CERTIFIED REGISTERED

## 2021-08-03 PROCEDURE — 25010000002 PROPOFOL 10 MG/ML EMULSION: Performed by: NURSE ANESTHETIST, CERTIFIED REGISTERED

## 2021-08-03 PROCEDURE — 73000 X-RAY EXAM OF COLLAR BONE: CPT

## 2021-08-03 PROCEDURE — 25010000002 FENTANYL CITRATE (PF) 50 MCG/ML SOLUTION: Performed by: ANESTHESIOLOGY

## 2021-08-03 PROCEDURE — 76942 ECHO GUIDE FOR BIOPSY: CPT | Performed by: ORTHOPAEDIC SURGERY

## 2021-08-03 PROCEDURE — 25010000002 ROPIVACAINE PER 1 MG: Performed by: ANESTHESIOLOGY

## 2021-08-03 DEVICE — IMPLANTABLE DEVICE: Type: IMPLANTABLE DEVICE | Site: SHOULDER | Status: FUNCTIONAL

## 2021-08-03 DEVICE — SCRW CORT S/TAP 3.5X16MM: Type: IMPLANTABLE DEVICE | Site: SHOULDER | Status: FUNCTIONAL

## 2021-08-03 DEVICE — SCRW LK S/TAP STRDRV 3.5X12MM: Type: IMPLANTABLE DEVICE | Site: SHOULDER | Status: FUNCTIONAL

## 2021-08-03 DEVICE — SCRW LK S/TAP STRDRV 3.5X14MM: Type: IMPLANTABLE DEVICE | Site: SHOULDER | Status: FUNCTIONAL

## 2021-08-03 RX ORDER — FENTANYL CITRATE 50 UG/ML
25 INJECTION, SOLUTION INTRAMUSCULAR; INTRAVENOUS
Status: DISCONTINUED | OUTPATIENT
Start: 2021-08-03 | End: 2021-08-03 | Stop reason: HOSPADM

## 2021-08-03 RX ORDER — FENTANYL CITRATE 50 UG/ML
50 INJECTION, SOLUTION INTRAMUSCULAR; INTRAVENOUS ONCE
Status: COMPLETED | OUTPATIENT
Start: 2021-08-03 | End: 2021-08-03

## 2021-08-03 RX ORDER — ONDANSETRON 2 MG/ML
4 INJECTION INTRAMUSCULAR; INTRAVENOUS ONCE AS NEEDED
Status: DISCONTINUED | OUTPATIENT
Start: 2021-08-03 | End: 2021-08-03 | Stop reason: HOSPADM

## 2021-08-03 RX ORDER — OXYCODONE AND ACETAMINOPHEN 7.5; 325 MG/1; MG/1
2 TABLET ORAL EVERY 4 HOURS PRN
Status: DISCONTINUED | OUTPATIENT
Start: 2021-08-03 | End: 2021-08-03 | Stop reason: HOSPADM

## 2021-08-03 RX ORDER — SODIUM CHLORIDE 0.9 % (FLUSH) 0.9 %
10 SYRINGE (ML) INJECTION EVERY 12 HOURS SCHEDULED
Status: DISCONTINUED | OUTPATIENT
Start: 2021-08-03 | End: 2021-08-03 | Stop reason: HOSPADM

## 2021-08-03 RX ORDER — SODIUM CHLORIDE 0.9 % (FLUSH) 0.9 %
10 SYRINGE (ML) INJECTION AS NEEDED
Status: DISCONTINUED | OUTPATIENT
Start: 2021-08-03 | End: 2021-08-03 | Stop reason: HOSPADM

## 2021-08-03 RX ORDER — DEXAMETHASONE SODIUM PHOSPHATE 4 MG/ML
4 INJECTION, SOLUTION INTRA-ARTICULAR; INTRALESIONAL; INTRAMUSCULAR; INTRAVENOUS; SOFT TISSUE ONCE AS NEEDED
Status: COMPLETED | OUTPATIENT
Start: 2021-08-03 | End: 2021-08-03

## 2021-08-03 RX ORDER — LIDOCAINE HYDROCHLORIDE 10 MG/ML
0.5 INJECTION, SOLUTION EPIDURAL; INFILTRATION; INTRACAUDAL; PERINEURAL ONCE AS NEEDED
Status: DISCONTINUED | OUTPATIENT
Start: 2021-08-03 | End: 2021-08-03 | Stop reason: HOSPADM

## 2021-08-03 RX ORDER — SODIUM CHLORIDE 0.9 % (FLUSH) 0.9 %
3 SYRINGE (ML) INJECTION EVERY 12 HOURS SCHEDULED
Status: DISCONTINUED | OUTPATIENT
Start: 2021-08-03 | End: 2021-08-03 | Stop reason: HOSPADM

## 2021-08-03 RX ORDER — ONDANSETRON 2 MG/ML
INJECTION INTRAMUSCULAR; INTRAVENOUS AS NEEDED
Status: DISCONTINUED | OUTPATIENT
Start: 2021-08-03 | End: 2021-08-03 | Stop reason: SURG

## 2021-08-03 RX ORDER — NALOXONE HCL 0.4 MG/ML
0.4 VIAL (ML) INJECTION AS NEEDED
Status: DISCONTINUED | OUTPATIENT
Start: 2021-08-03 | End: 2021-08-03 | Stop reason: HOSPADM

## 2021-08-03 RX ORDER — SODIUM CHLORIDE, SODIUM LACTATE, POTASSIUM CHLORIDE, CALCIUM CHLORIDE 600; 310; 30; 20 MG/100ML; MG/100ML; MG/100ML; MG/100ML
100 INJECTION, SOLUTION INTRAVENOUS CONTINUOUS
Status: DISCONTINUED | OUTPATIENT
Start: 2021-08-03 | End: 2021-08-03 | Stop reason: HOSPADM

## 2021-08-03 RX ORDER — IBUPROFEN 600 MG/1
600 TABLET ORAL ONCE AS NEEDED
Status: DISCONTINUED | OUTPATIENT
Start: 2021-08-03 | End: 2021-08-03 | Stop reason: HOSPADM

## 2021-08-03 RX ORDER — DEXAMETHASONE SODIUM PHOSPHATE 4 MG/ML
INJECTION, SOLUTION INTRA-ARTICULAR; INTRALESIONAL; INTRAMUSCULAR; INTRAVENOUS; SOFT TISSUE AS NEEDED
Status: DISCONTINUED | OUTPATIENT
Start: 2021-08-03 | End: 2021-08-03 | Stop reason: SURG

## 2021-08-03 RX ORDER — ACETAMINOPHEN 500 MG
1000 TABLET ORAL ONCE
Status: COMPLETED | OUTPATIENT
Start: 2021-08-03 | End: 2021-08-03

## 2021-08-03 RX ORDER — LABETALOL HYDROCHLORIDE 5 MG/ML
5 INJECTION, SOLUTION INTRAVENOUS
Status: DISCONTINUED | OUTPATIENT
Start: 2021-08-03 | End: 2021-08-03 | Stop reason: HOSPADM

## 2021-08-03 RX ORDER — SODIUM CHLORIDE 0.9 % (FLUSH) 0.9 %
3-10 SYRINGE (ML) INJECTION AS NEEDED
Status: DISCONTINUED | OUTPATIENT
Start: 2021-08-03 | End: 2021-08-03 | Stop reason: HOSPADM

## 2021-08-03 RX ORDER — ROCURONIUM BROMIDE 10 MG/ML
INJECTION, SOLUTION INTRAVENOUS AS NEEDED
Status: DISCONTINUED | OUTPATIENT
Start: 2021-08-03 | End: 2021-08-03 | Stop reason: SURG

## 2021-08-03 RX ORDER — OXYCODONE AND ACETAMINOPHEN 10; 325 MG/1; MG/1
1 TABLET ORAL ONCE AS NEEDED
Status: DISCONTINUED | OUTPATIENT
Start: 2021-08-03 | End: 2021-08-03 | Stop reason: HOSPADM

## 2021-08-03 RX ORDER — PROPOFOL 10 MG/ML
VIAL (ML) INTRAVENOUS AS NEEDED
Status: DISCONTINUED | OUTPATIENT
Start: 2021-08-03 | End: 2021-08-03 | Stop reason: SURG

## 2021-08-03 RX ORDER — FLUMAZENIL 0.1 MG/ML
0.2 INJECTION INTRAVENOUS AS NEEDED
Status: DISCONTINUED | OUTPATIENT
Start: 2021-08-03 | End: 2021-08-03 | Stop reason: HOSPADM

## 2021-08-03 RX ORDER — MAGNESIUM HYDROXIDE 1200 MG/15ML
LIQUID ORAL AS NEEDED
Status: DISCONTINUED | OUTPATIENT
Start: 2021-08-03 | End: 2021-08-03 | Stop reason: HOSPADM

## 2021-08-03 RX ORDER — OXYCODONE AND ACETAMINOPHEN 7.5; 325 MG/1; MG/1
1 TABLET ORAL EVERY 6 HOURS PRN
Qty: 20 TABLET | Refills: 0 | Status: SHIPPED | OUTPATIENT
Start: 2021-08-03 | End: 2021-09-22

## 2021-08-03 RX ORDER — ONDANSETRON 4 MG/1
4 TABLET, FILM COATED ORAL EVERY 8 HOURS PRN
Qty: 10 TABLET | Refills: 0 | Status: SHIPPED | OUTPATIENT
Start: 2021-08-03 | End: 2021-09-22

## 2021-08-03 RX ORDER — LIDOCAINE HYDROCHLORIDE 20 MG/ML
INJECTION, SOLUTION EPIDURAL; INFILTRATION; INTRACAUDAL; PERINEURAL AS NEEDED
Status: DISCONTINUED | OUTPATIENT
Start: 2021-08-03 | End: 2021-08-03 | Stop reason: SURG

## 2021-08-03 RX ORDER — SODIUM CHLORIDE, SODIUM LACTATE, POTASSIUM CHLORIDE, CALCIUM CHLORIDE 600; 310; 30; 20 MG/100ML; MG/100ML; MG/100ML; MG/100ML
100 INJECTION, SOLUTION INTRAVENOUS CONTINUOUS PRN
Status: DISCONTINUED | OUTPATIENT
Start: 2021-08-03 | End: 2021-08-03 | Stop reason: HOSPADM

## 2021-08-03 RX ORDER — ROPIVACAINE HYDROCHLORIDE 5 MG/ML
INJECTION, SOLUTION EPIDURAL; INFILTRATION; PERINEURAL
Status: COMPLETED | OUTPATIENT
Start: 2021-08-03 | End: 2021-08-03

## 2021-08-03 RX ORDER — LIDOCAINE HYDROCHLORIDE 40 MG/ML
SOLUTION TOPICAL AS NEEDED
Status: DISCONTINUED | OUTPATIENT
Start: 2021-08-03 | End: 2021-08-03 | Stop reason: SURG

## 2021-08-03 RX ADMIN — DEXAMETHASONE SODIUM PHOSPHATE 4 MG: 4 INJECTION, SOLUTION INTRA-ARTICULAR; INTRALESIONAL; INTRAMUSCULAR; INTRAVENOUS; SOFT TISSUE at 14:11

## 2021-08-03 RX ADMIN — FENTANYL CITRATE 50 MCG: 50 INJECTION, SOLUTION INTRAMUSCULAR; INTRAVENOUS at 11:17

## 2021-08-03 RX ADMIN — ROCURONIUM BROMIDE 30 MG: 50 INJECTION INTRAVENOUS at 13:30

## 2021-08-03 RX ADMIN — SUGAMMADEX 100 MG: 100 INJECTION, SOLUTION INTRAVENOUS at 14:11

## 2021-08-03 RX ADMIN — ACETAMINOPHEN 1000 MG: 500 TABLET, FILM COATED ORAL at 11:17

## 2021-08-03 RX ADMIN — SODIUM CHLORIDE, POTASSIUM CHLORIDE, SODIUM LACTATE AND CALCIUM CHLORIDE 100 ML/HR: 600; 310; 30; 20 INJECTION, SOLUTION INTRAVENOUS at 09:48

## 2021-08-03 RX ADMIN — ONDANSETRON 4 MG: 2 INJECTION INTRAMUSCULAR; INTRAVENOUS at 14:11

## 2021-08-03 RX ADMIN — PROPOFOL 150 MG: 10 INJECTION, EMULSION INTRAVENOUS at 13:30

## 2021-08-03 RX ADMIN — LIDOCAINE HYDROCHLORIDE 1 EACH: 40 SOLUTION TOPICAL at 13:30

## 2021-08-03 RX ADMIN — SODIUM CHLORIDE, POTASSIUM CHLORIDE, SODIUM LACTATE AND CALCIUM CHLORIDE 100 ML/HR: 600; 310; 30; 20 INJECTION, SOLUTION INTRAVENOUS at 14:37

## 2021-08-03 RX ADMIN — LIDOCAINE HYDROCHLORIDE 60 MG: 20 INJECTION, SOLUTION EPIDURAL; INFILTRATION; INTRACAUDAL; PERINEURAL at 13:30

## 2021-08-03 RX ADMIN — ROPIVACAINE HYDROCHLORIDE 20 ML: 5 INJECTION, SOLUTION EPIDURAL; INFILTRATION; PERINEURAL at 11:26

## 2021-08-03 RX ADMIN — CEFAZOLIN 1 G: 1 INJECTION, POWDER, FOR SOLUTION INTRAMUSCULAR; INTRAVENOUS; PARENTERAL at 13:46

## 2021-08-03 RX ADMIN — DEXAMETHASONE SODIUM PHOSPHATE 4 MG: 4 INJECTION, SOLUTION INTRA-ARTICULAR; INTRALESIONAL; INTRAMUSCULAR; INTRAVENOUS; SOFT TISSUE at 11:17

## 2021-08-03 NOTE — BRIEF OP NOTE
CLAVICLE OPEN REDUCTION INTERNAL FIXATION  Progress Note    Jyoti Pleitez  8/3/2021    Pre-op Diagnosis:   S42.035A       Post-Op Diagnosis Codes:     * Traumatic closed displaced fracture of acromial end of clavicle with nonunion, left [S42.032K]    Procedure/CPT® Codes:  FL OSTEOTOMY CLAVICLE W BONE GRAFT [86398]      Procedure(s):  OPEN REDUCTION INTERNAL FIXATION LEFT DISTAL CLAVICLE    Surgeon(s):  Nixon Garcia MD    Anesthesia: General with Block    Staff:   Circulator: Elsa Joseph RN; Perla Morin RN  Scrub Person: Rudi Maloney; Evangelina Alford; Epi Soto  Assistant: Carlie Winn  Assistant: Carlie Winn      Estimated Blood Loss: minimal    Urine Voided: * No values recorded between 8/3/2021  1:26 PM and 8/3/2021  2:30 PM *    Specimens:                None          Drains: * No LDAs found *    Findings: see op note     Complications: none      Nixon Garcia MD     Date: 8/3/2021  Time: 14:30 CDT

## 2021-08-03 NOTE — ANESTHESIA PROCEDURE NOTES
Peripheral Block    Pre-sedation assessment completed: 8/3/2021 11:19 AM    Patient reassessed immediately prior to procedure    Patient location during procedure: pre-op  Start time: 8/3/2021 11:23 AM  Stop time: 8/3/2021 11:26 AM  Reason for block: procedure for pain, at surgeon's request and post-op pain management  Performed by  Anesthesiologist: Vibha Queen MD  Preanesthetic Checklist  Completed: patient identified, IV checked, site marked, risks and benefits discussed, surgical consent, monitors and equipment checked, pre-op evaluation and timeout performed  Prep:  Pt Position: supine  Sterile barriers:alcohol skin prep, gloves, washed/disinfected hands and mask  Prep: ChloraPrep  Patient monitoring: blood pressure monitoring, continuous pulse oximetry and EKG  Procedure  Sedation:yes    Guidance:ultrasound guided and Brachial plexus identified and local anesthetic seen surrounding nerves  ULTRASOUND INTERPRETATION. Using ultrasound guidance a 20 G gauge needle was placed in close proximity to the nerve, at which point, under ultrasound guidance anesthetic was injected in the area of the nerve and spread of the anesthesia was seen on ultrasound in close proximity thereto.  There were no abnormalities seen on ultrasound; a digital image was taken; and the patient tolerated the procedure with no complications. Images:still images obtained (picture printed and placed in patients chart)    Laterality:left  Block Type:interscalene  Injection Technique:single-shot  Needle Type:echogenic  Needle Gauge:20 G  Resistance on Injection: none    Medications Used: ropivacaine (NAROPIN) injection 0.5 %, 20 mL  Med admintered at 8/3/2021 11:26 AM      Post Assessment  Injection Assessment: negative aspiration for heme, no paresthesia on injection and incremental injection  Patient Tolerance:comfortable throughout block  Complications:no

## 2021-08-03 NOTE — ANESTHESIA PREPROCEDURE EVALUATION
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

## 2021-08-03 NOTE — H&P
Pt Name: Jyoti Pleitez  MRN: [id number]  YOB: 1965  Date of evaluation: 8/3/2021    H&P including current review of systems was updated in the paper chart and/or the document previously scanned into the record.  There have been no significant changes or new problems since the original evaluation.  The patient's problems continue and indications for contemplated procedure have not changed.    Electronically signed by Nixon Garcia MD on 8/3/2021 at 10:28 CDT

## 2021-08-03 NOTE — DISCHARGE INSTRUCTIONS
YOUR NEXT PAIN MEDICATION IS DUE AT______________         General Anesthesia, Adult, Care After  This sheet gives you information about how to care for yourself after your procedure. Your health care provider may also give you more specific instructions. If you have problems or questions, contact your health care provider.  What can I expect after the procedure?  After the procedure, the following side effects are common:  · Pain or discomfort at the IV site.  · Nausea.  · Vomiting.  · Sore throat.  · Trouble concentrating.  · Feeling cold or chills.  · Weak or tired.  · Sleepiness and fatigue.  · Soreness and body aches. These side effects can affect parts of the body that were not involved in surgery.  Follow these instructions at home:   For at least 24 hours after the procedure:  1. Have a responsible adult stay with you. It is important to have someone help care for you until you are awake and alert.  2. Rest as needed.  3. Do not:  ? Participate in activities in which you could fall or become injured.  ? Drive.  ? Use heavy machinery.  ? Drink alcohol.  ? Take sleeping pills or medicines that cause drowsiness.  ? Make important decisions or sign legal documents.  ? Take care of children on your own.  Eating and drinking  · Follow any instructions from your health care provider about eating or drinking restrictions.  · When you feel hungry, start by eating small amounts of foods that are soft and easy to digest (bland), such as toast. Gradually return to your regular diet.  · Drink enough fluid to keep your urine pale yellow.  · If you vomit, rehydrate by drinking water, juice, or clear broth.  General instructions  1. If you have sleep apnea, surgery and certain medicines can increase your risk for breathing problems. Follow instructions from your health care provider about wearing your sleep device:  ? Anytime you are sleeping, including during daytime naps.  ? While taking prescription pain medicines,  sleeping medicines, or medicines that make you drowsy.  2. Return to your normal activities as told by your health care provider. Ask your health care provider what activities are safe for you.  3. Take over-the-counter and prescription medicines only as told by your health care provider.  4. If you smoke, do not smoke without supervision.  5. Keep all follow-up visits as told by your health care provider. This is important.  Contact a health care provider if:  · You have nausea or vomiting that does not get better with medicine.  · You cannot eat or drink without vomiting.  · You have pain that does not get better with medicine.  · You are unable to pass urine.  · You develop a skin rash.  · You have a fever.  · You have redness around your IV site that gets worse.  Get help right away if:  · You have difficulty breathing.  · You have chest pain.  · You have blood in your urine or stool, or you vomit blood.  Summary  · After the procedure, it is common to have a sore throat or nausea. It is also common to feel tired.  · Have a responsible adult stay with you for the first 24 hours after general anesthesia. It is important to have someone help care for you until you are awake and alert.  · When you feel hungry, start by eating small amounts of foods that are soft and easy to digest (bland), such as toast. Gradually return to your regular diet.  · Drink enough fluid to keep your urine pale yellow.  · Return to your normal activities as told by your health care provider. Ask your health care provider what activities are safe for you.  This information is not intended to replace advice given to you by your health care provider. Make sure you discuss any questions you have with your health care provider.  Document Revised: 12/21/2018 Document Reviewed: 08/03/2018    CALL YOUR PHYSICIAN IF YOU EXPERIENCE  INCREASED PAIN NOT HELPED BY YOUR PAIN MEDICATION.      .                                              Fall  Prevention in the Home      Falls can cause injuries. They can happen to people of all ages. There are many things you can do to make your home safe and to help prevent falls.    WHAT CAN I DO ON THE OUTSIDE OF MY HOME?  · Regularly fix the edges of walkways and driveways and fix any cracks.  · Remove anything that might make you trip as you walk through a door, such as a raised step or threshold.  · Trim any bushes or trees on the path to your home.  · Use bright outdoor lighting.  · Clear any walking paths of anything that might make someone trip, such as rocks or tools.  · Regularly check to see if handrails are loose or broken. Make sure that both sides of any steps have handrails.  · Any raised decks and porches should have guardrails on the edges.  · Have any leaves, snow, or ice cleared regularly.  · Use sand or salt on walking paths during winter.  · Clean up any spills in your garage right away. This includes oil or grease spills.  WHAT CAN I DO IN THE BATHROOM?    · Use night lights.  · Install grab bars by the toilet and in the tub and shower. Do not use towel bars as grab bars.  · Use non-skid mats or decals in the tub or shower.  · If you need to sit down in the shower, use a plastic, non-slip stool.  · Keep the floor dry. Clean up any water that spills on the floor as soon as it happens.  · Remove soap buildup in the tub or shower regularly.  · Attach bath mats securely with double-sided non-slip rug tape.  · Do not have throw rugs and other things on the floor that can make you trip.  WHAT CAN I DO IN THE BEDROOM?  · Use night lights.  · Make sure that you have a light by your bed that is easy to reach.  · Do not use any sheets or blankets that are too big for your bed. They should not hang down onto the floor.  · Have a firm chair that has side arms. You can use this for support while you get dressed.  · Do not have throw rugs and other things on the floor that can make you trip.  WHAT CAN I DO IN  THE KITCHEN?  · Clean up any spills right away.  · Avoid walking on wet floors.  · Keep items that you use a lot in easy-to-reach places.  · If you need to reach something above you, use a strong step stool that has a grab bar.  · Keep electrical cords out of the way.  · Do not use floor polish or wax that makes floors slippery. If you must use wax, use non-skid floor wax.  · Do not have throw rugs and other things on the floor that can make you trip.  WHAT CAN I DO WITH MY STAIRS?  · Do not leave any items on the stairs.  · Make sure that there are handrails on both sides of the stairs and use them. Fix handrails that are broken or loose. Make sure that handrails are as long as the stairways.  · Check any carpeting to make sure that it is firmly attached to the stairs. Fix any carpet that is loose or worn.  · Avoid having throw rugs at the top or bottom of the stairs. If you do have throw rugs, attach them to the floor with carpet tape.  · Make sure that you have a light switch at the top of the stairs and the bottom of the stairs. If you do not have them, ask someone to add them for you.  WHAT ELSE CAN I DO TO HELP PREVENT FALLS?  · Wear shoes that:  ¨ Do not have high heels.  ¨ Have rubber bottoms.  ¨ Are comfortable and fit you well.  ¨ Are closed at the toe. Do not wear sandals.  · If you use a stepladder:  ¨ Make sure that it is fully opened. Do not climb a closed stepladder.  ¨ Make sure that both sides of the stepladder are locked into place.  ¨ Ask someone to hold it for you, if possible.  · Clearly cristina and make sure that you can see:  ¨ Any grab bars or handrails.  ¨ First and last steps.  ¨ Where the edge of each step is.  · Use tools that help you move around (mobility aids) if they are needed. These include:  ¨ Canes.  ¨ Walkers.  ¨ Scooters.  ¨ Crutches.  · Turn on the lights when you go into a dark area. Replace any light bulbs as soon as they burn out.  · Set up your furniture so you have a clear  path. Avoid moving your furniture around.  · If any of your floors are uneven, fix them.  · If there are any pets around you, be aware of where they are.  · Review your medicines with your doctor. Some medicines can make you feel dizzy. This can increase your chance of falling.  Ask your doctor what other things that you can do to help prevent falls.     This information is not intended to replace advice given to you by your health care provider. Make sure you discuss any questions you have with your health care provider.     Document Released: 10/14/2010 Document Revised: 05/03/2016 Document Reviewed: 01/22/2016  [employer] Interactive Patient Education ©2016 [employer]     PATIENT/FAMILY/RESPONSIBLE PARTY VERBALIZES UNDERSTANDING OF ABOVE EDUCATION.  COPY OF PAIN SCALE GIVEN AND REVIEWED WITH VERBALIZED UNDERSTANDING.      What to expect after a Nerve Block  Nerve blocks administered to block pain affect many types of nerves, including those nerves that control movement, pain, and normal sensation.  Following a nerve block, you may notice some bruising at the site where the block was given.  You may experience sensations such as:  numbness of the affected area or limb, tingling, heaviness (that is the limb feels heavy to you), weakness or inability to move the affected arm or leg, or a feeling as if your arm or leg has “fallen asleep”.    A nerve block can last from 9-18 hours depending on the medications used.  Usually the weakness wears off first followed by the tingling and heaviness.  As the block wears off, you may begin to notice pain; however, this sequence of events may occur in any order.  Typically, you will be able to move your limb before you will feel it.  Once a nerve block begins to wear off, the effects are usually completely gone within 60 minutes.    If you experience continued side effects that you believe are block related for longer than 48 hours, please call your healthcare provider.  Please  see block-specific instructions below.    Instructions for any block involving the shoulder or arm  • If you have had any kind of shoulder/arm block, you will go home with your arm in a sling.  Wear the sling until the block has completely worn off.  You may be required to wear it for a longer period of time per your surgeon’s recommendations.  • I you have had a shoulder/arm block; it is a good idea to sleep on a recliner with pillows under your arm.    Note:  If you have severe or prolonged shortness of breath, please seek medical assistance as soon as possible.    Protection of a “blocked” arm (limb)  • After a nerve block, you cannot feel pain, pressure, or extremes of temperature in the affected limb.  And because of this, your blocked limb is at more risk for injury.  For example, it is possible to burn your limb on an extremely hot surface without feeling it.  • When resting, it is important to reposition your limb periodically to avoid prolonged pressure on it.  This may require the use of pillows and padding.  • While sleeping, you should avoid rolling onto the affected limb or putting too much pressure on it.  • If you have a cast or tight dressing, check the color of your fingers of the affected limb.  Call your surgeon if they look discolored (that is, dusky, dark colored)  • Use caution in cold weather.  Cover your limb appropriately to protect it from the cold.  Pain Management  Your surgeon will give you a prescription for pain medication.  Begin taking this before the nerve block wears off.  Bear in mind that sometimes the block can wear off in the middle of the night.                              UPPER EXTREMITY POST-OP INSTRUCTIONS - DR. MORALES    IMPORTANT PHONE NUMBERS:  • For emergencies, please call [phone]  • You may reach Dr. Morales and clinical staff at [phone]- M-F 8:00 am-5:00 pm  • After 5pm or on the weekends, please call [phone]  • Call immediately if you have any of the following  symptoms:     Elevated temperature above 101.5 degrees for more than 48 hours after surgery     Persistent drainage from wound     Severe pain around surgical site    Sling use: The sling is provided for your comfort and to ensure proper healing of your repair following surgery. Please place the abduction pillow with the curved side against your side and the sling on the side of the pillow. Your surgery requires that you wear the sling if noted below.  __x__ For comfort.  ____ At all times except bathing, dressing, and therapy. Also wear the sling during sleep.  ____ No sling required    Bathing:  ___No bandages, no restrictions!!  _x__You may remove you dressing and shower on the 3rd day after surgery (Ex. Tues surgery, shower on Friday)  ** if you are told to it is ok to remove your dressing and shower, DO NOT SOAK your incisions in a tub.  ___Keep splint clean, dry, and intact. DO NOT place foreign objects into your splint.      Dressings: Keep dressing/splint intact unless instructed otherwise below. SOME DRAINAGE IS NORMAL!    • DO NOT touch or apply ointment to the incision.    • DO NOT remove the steri-strips over the incisions (if you have steri-strips). They will         generally fall off on their own or can be removed 1 weeksafter surgery.    • If you have yellow gauze and it comes off, do not worry about it. Leave them off.   • Signs of infection that warrant a phone call to our clinical line:     o Excessive drainage or redness     o Red streaking coming away from the incision  o Increased pain  o Increased temperature above 101 degrees      Physical Therapy:        *  Your physical therapy status will be discussed with you postoperatively and at your first post-op appointment. Some injuries will not require physical therapy.      *  If you have a shoulder manipulation, please schedule therapy for the next day      Medications: You will be discharged with the appropriate medications following your  surgery. Fill these at the pharmacy and take them as directed on the label. Not all of the medications below may be prescribed. Occasionally, other medications may be prescribed with specific instructions.    Percocet/Lortab (oxycodone/hydrocodone with tylenol) - Pain Medication, will cause drowsiness, possibly itchiness (this is NOT an allergy - use benadryl or an over the counter allergy medication such as Claritin or Zyrtec)     o Take 1-2 tablets every 4-6 hours. DO NOT EXCEED 4,000mg of Tylenol in 24 hours.  **DO NOT MIX WITH ALCOHOL, DRIVE WHILE TAKING, OR TAKE with extra TYLENOL**    Colace (Docusate) - stool softener, used for constipation. Take this only if you feel constipated.      Zofran (Ondansetron) or Phenergan - Anti-nausea medication, will cause drowsiness      *Starting January 2021, all narcotic medication must be prescribed electronically to your pharmacy.  Be sure to notify nursing of your preferred pharmacy.  If you are running low on pain medications, please notify us if you need a refill 24-48 hours prior to when you run out, so we can make arrangements to refill the prescription for you if we determine is necessary

## 2021-08-04 NOTE — ANESTHESIA POSTPROCEDURE EVALUATION
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, left      (S42.035A)    Surgeons: Nixon Garcia MD Provider: Robin Christianson CRNA    Anesthesia Type: general with block ASA Status: 2          Anesthesia Type: general with block    Vitals  Vitals Value Taken Time   /72 08/03/21 1456   Temp 97 °F (36.1 °C) 08/03/21 1456   Pulse 86 08/03/21 1456   Resp 14 08/03/21 1456   SpO2 100 % 08/03/21 1456           Post Anesthesia Care and Evaluation    Patient location during evaluation: PACU  Patient participation: complete - patient participated  Level of consciousness: awake and alert  Pain management: adequate  Airway patency: patent  Anesthetic complications: No anesthetic complications    Cardiovascular status: acceptable  Respiratory status: acceptable  Hydration status: acceptable    Comments: Blood pressure 113/62, pulse 75, temperature 97 °F (36.1 °C), resp. rate 16, SpO2 100 %, not currently breastfeeding.    Pt discharged from PACU based on bob score >8

## 2021-08-24 RX ORDER — TRAMADOL HYDROCHLORIDE 50 MG/1
50 TABLET ORAL EVERY 4 HOURS PRN
Qty: 20 TABLET | Refills: 0 | Status: SHIPPED | OUTPATIENT
Start: 2021-08-24 | End: 2021-09-22

## 2021-09-22 ENCOUNTER — OFFICE VISIT (OUTPATIENT)
Dept: OBSTETRICS AND GYNECOLOGY | Facility: CLINIC | Age: 56
End: 2021-09-22

## 2021-09-22 VITALS
DIASTOLIC BLOOD PRESSURE: 70 MMHG | WEIGHT: 118 LBS | SYSTOLIC BLOOD PRESSURE: 114 MMHG | BODY MASS INDEX: 19.66 KG/M2 | HEIGHT: 65 IN

## 2021-09-22 DIAGNOSIS — Z78.0 MENOPAUSE: Primary | ICD-10-CM

## 2021-09-22 PROCEDURE — 99213 OFFICE O/P EST LOW 20 MIN: CPT | Performed by: OBSTETRICS & GYNECOLOGY

## 2021-09-22 NOTE — PROGRESS NOTES
"Subjective   Chief Complaint   Patient presents with   • Follow-up     pt here for medication refill for HRT.      Jyoti Pleitez is a 56 y.o. year old .  No LMP recorded. Patient has had a hysterectomy.  She presents to be seen because she is out of her HRT.  Patient has now switched to having everything compounded at  Pharmacy, but has been out for two weeks.  She was feeling like her levels needed to be adjusted, but drawing bloodwork today will be minimally beneficial since she has not had any hormones for so long.  Patient reports that her energy level was not as good, but is not sure whether that is due to having a concussion this year or recent surgery to fix a broken clavicle.     The following portions of the patient's history were reviewed and updated as appropriate:current medications and allergies    Social History    Tobacco Use      Smoking status: Former Smoker        Years: 14.00        Types: Cigarettes      Smokeless tobacco: Never Used      Tobacco comment: 17 YRS AGO    Review of Systems   Constitutional: Negative for activity change and unexpected weight change.   Respiratory: Negative for shortness of breath.    Cardiovascular: Positive for chest pain (from recent surgery on clavicle.  She just came out of sling).   Endocrine: Positive for heat intolerance (since being out of hormones).   Genitourinary: Negative for vaginal bleeding.        S/p hysterectomy         Objective   /70   Ht 165.1 cm (65\")   Wt 53.5 kg (118 lb)   BMI 19.64 kg/m²     Physical Exam  Vitals and nursing note reviewed.   Constitutional:       General: She is not in acute distress.     Appearance: She is well-developed.   HENT:      Head: Normocephalic and atraumatic.   Neck:      Thyroid: No thyromegaly.   Pulmonary:      Effort: Pulmonary effort is normal.   Musculoskeletal:         General: Normal range of motion.      Cervical back: Normal range of motion.   Skin:     General: Skin is warm and dry. "   Neurological:      Mental Status: She is alert and oriented to person, place, and time.   Psychiatric:         Behavior: Behavior normal.         Judgment: Judgment normal.         Lab Review   No data reviewed    Imaging   No data reviewed     Assessment & Plan    Diagnoses and all orders for this visit:    1. Menopause (Primary): Patient has now been off her hormone replacement therapy for 2 weeks.  1 month supply of her most recent compounded HRT is being called into [facility] pharmacy today.  After the patient has been taking that dose for 3 weeks, she will come into the office and have these labs drawn.  We will forward them to [place] the following day so that there can be adjustments to her compound made before she picks it up again.  Patient scheduling a 3-month follow-up as she leaves.  Patient has had an annual wellness physical with Dr. Montero this year and has already had her mammogram done this year.  Bone density testing is not due this year.  -     Estradiol  -     Progesterone  -     Testosterone      This note was electronically signed.    Rossy Georges MD  September 22, 2021  08:28 CDT    Total time spent today with Jyoti  was 20-29 minutes (level 3).  Greater than 50% of the time was spent coordinating care, answering her questions and counseling regarding bone health, changes in menopause due to hormone deficiencies and pathophysiology of her presenting problem along with plans for any diagnositc work-up and treatment.

## 2021-09-23 ENCOUNTER — TELEPHONE (OUTPATIENT)
Dept: OBSTETRICS AND GYNECOLOGY | Facility: CLINIC | Age: 56
End: 2021-09-23

## 2021-09-23 NOTE — TELEPHONE ENCOUNTER
1 month of pt current compounded HRT called in to  pharmacy. Pt notified that I was calling to let pharmacy know and they would call her when it is available. Pt understood. BS

## 2021-09-23 NOTE — TELEPHONE ENCOUNTER
----- Message from Rossy Georges MD sent at 9/22/2021  8:42 AM CDT -----  Please call [facility] pharmacy and asked them to make the patient 1 months worth of her current HRT compound.  The patient was hoping to make some adjustments to her compound, but has now been without hormones for 2 weeks, and so drawing her blood today would be pointless.  The patient is going to go back on the same compound that she was on previously, and then have her blood drawn after 3 weeks so that we can send them her labs and make adjustments.  Thanks

## 2021-10-16 LAB
ESTRADIOL SERPL-MCNC: 22.2 PG/ML
PROGEST SERPL-MCNC: 0.6 NG/ML
TESTOST SERPL-MCNC: 6 NG/DL (ref 4–50)

## 2021-10-21 ENCOUNTER — TELEPHONE (OUTPATIENT)
Dept: OBSTETRICS AND GYNECOLOGY | Facility: CLINIC | Age: 56
End: 2021-10-21

## 2021-10-21 NOTE — TELEPHONE ENCOUNTER
Pt. Called to check on compound status d/t pharmacy not having meds ready.  Pharmacy called and fax confirmed, pt. Called back and updated.

## 2021-10-27 ENCOUNTER — TELEPHONE (OUTPATIENT)
Dept: OBSTETRICS AND GYNECOLOGY | Facility: CLINIC | Age: 56
End: 2021-10-27

## 2021-10-27 NOTE — TELEPHONE ENCOUNTER
Pt taken care of. Spoke with [facility], faxed her labs, and they will be reaching out to  pharmacy to see what pts current dosage is on her HRT. DELLA

## 2021-10-27 NOTE — TELEPHONE ENCOUNTER
Pt has called office requesting her compound be called into [facility].  Pt has not been able to get compound from [facility].  Please advise.

## 2021-11-01 ENCOUNTER — TELEPHONE (OUTPATIENT)
Dept: OBSTETRICS AND GYNECOLOGY | Facility: CLINIC | Age: 56
End: 2021-11-01

## 2021-11-01 NOTE — TELEPHONE ENCOUNTER
Called to notify pt that [facility] had sent over recommendations for her compounding and Dr Georges signed off with 2 additional refills. Pt understood. BS

## 2021-11-02 ENCOUNTER — TRANSCRIBE ORDERS (OUTPATIENT)
Dept: LAB | Facility: HOSPITAL | Age: 56
End: 2021-11-02

## 2021-11-02 DIAGNOSIS — Z11.59 SCREENING FOR VIRAL DISEASE: Primary | ICD-10-CM

## 2021-11-04 ENCOUNTER — PRE-ADMISSION TESTING (OUTPATIENT)
Dept: PREADMISSION TESTING | Facility: HOSPITAL | Age: 56
End: 2021-11-04

## 2021-11-04 VITALS
HEART RATE: 72 BPM | RESPIRATION RATE: 16 BRPM | SYSTOLIC BLOOD PRESSURE: 102 MMHG | OXYGEN SATURATION: 100 % | WEIGHT: 122.58 LBS | DIASTOLIC BLOOD PRESSURE: 68 MMHG | BODY MASS INDEX: 19.7 KG/M2 | HEIGHT: 66 IN

## 2021-11-04 LAB
ANION GAP SERPL CALCULATED.3IONS-SCNC: 8 MMOL/L (ref 5–15)
BUN SERPL-MCNC: 13 MG/DL (ref 6–20)
BUN/CREAT SERPL: 18.3 (ref 7–25)
CALCIUM SPEC-SCNC: 9.4 MG/DL (ref 8.6–10.5)
CHLORIDE SERPL-SCNC: 102 MMOL/L (ref 98–107)
CO2 SERPL-SCNC: 30 MMOL/L (ref 22–29)
CREAT SERPL-MCNC: 0.71 MG/DL (ref 0.57–1)
DEPRECATED RDW RBC AUTO: 48.1 FL (ref 37–54)
ERYTHROCYTE [DISTWIDTH] IN BLOOD BY AUTOMATED COUNT: 13.4 % (ref 12.3–15.4)
GFR SERPL CREATININE-BSD FRML MDRD: 86 ML/MIN/1.73
GLUCOSE SERPL-MCNC: 82 MG/DL (ref 65–99)
HCT VFR BLD AUTO: 41.6 % (ref 34–46.6)
HGB BLD-MCNC: 13.5 G/DL (ref 12–15.9)
MCH RBC QN AUTO: 31 PG (ref 26.6–33)
MCHC RBC AUTO-ENTMCNC: 32.5 G/DL (ref 31.5–35.7)
MCV RBC AUTO: 95.4 FL (ref 79–97)
PLATELET # BLD AUTO: 327 10*3/MM3 (ref 140–450)
PMV BLD AUTO: 10.4 FL (ref 6–12)
POTASSIUM SERPL-SCNC: 4.4 MMOL/L (ref 3.5–5.2)
RBC # BLD AUTO: 4.36 10*6/MM3 (ref 3.77–5.28)
SODIUM SERPL-SCNC: 140 MMOL/L (ref 136–145)
WBC # BLD AUTO: 5.73 10*3/MM3 (ref 3.4–10.8)

## 2021-11-04 PROCEDURE — 85027 COMPLETE CBC AUTOMATED: CPT

## 2021-11-04 PROCEDURE — 80048 BASIC METABOLIC PNL TOTAL CA: CPT

## 2021-11-04 PROCEDURE — 36415 COLL VENOUS BLD VENIPUNCTURE: CPT

## 2021-11-04 RX ORDER — BUSPIRONE HYDROCHLORIDE 5 MG/1
5 TABLET ORAL 2 TIMES DAILY PRN
Status: ON HOLD | COMMUNITY
End: 2021-11-09

## 2021-11-04 NOTE — DISCHARGE INSTRUCTIONS
DAY OF SURGERY INSTRUCTIONS          ARRIVAL TIME: AS DIRECTED BY OFFICE    YOU MAY TAKE THE FOLLOWING MEDICATION(S) THE MORNING OF SURGERY WITH A SIP OF WATER: ***buspar      ALL OTHER HOME MEDICATION CHECK WITH YOUR PHYSICIAN      DO NOT TAKE ANY ERECTILE DYSFUNCTION MEDICATIONS (EX: CIALIS, VIAGRA) 24 HOURS PRIOR TO SURGERY                      MANAGING PAIN AFTER SURGERY    We know you are probably wondering what your pain will be like after surgery.  Following surgery it is unrealistic to expect you will not have pain.   Pain is how our bodies let us know that something is wrong or cautions us to be careful.  That said, our goal is to make your pain tolerable.    Methods we may use to treat your pain include (oral or IV medications, PCAs, epidurals, nerve blocks, etc.)   While some procedures require IV pain medications for a short time after surgery, transitioning to pain medications by mouth allows for better management of pain.   Your nurse will encourage you to take oral pain medications whenever possible.  IV medications work almost immediately, but only last a short while.  Taking medications by mouth allows for a more constant level of medication in your blood stream for a longer period of time.      Once your pain is out of control it is harder to get back under control.  It is important you are aware when your next dose of pain medication is due.  If you are admitted, your nurse may write the time of your next dose on the white board in your room to help you remember.      We are interested in your pain and encourage you to inform us about aggravating factors during your visit.   Many times a simple repositioning every few hours can make a big difference.    If your physician says it is okay, do not let your pain prevent you from getting out of bed. Be sure to call your nurse for assistance prior to getting up so you do not fall.      Before surgery, please decide your tolerable pain goal.  These  faces help describe the pain ratings we use on a 0-10 scale.   Be prepared to tell us your goal and whether or not you take pain or anxiety medications at home.          BEFORE YOU COME TO THE HOSPITAL  (Pre-op instructions)  • Do not eat, drink, smoke or chew gum after midnight the night before surgery.  This also includes no mints.  • Morning of surgery take only the medicines you have been instructed with a sip of water unless otherwise instructed  by your physician.  • Do not shave, wear makeup or dark nail polish.  • Remove all jewelry including rings.  • Leave anything you consider valuable at home.  • Leave your suitcase in the car until after your surgery.  • Bring the following with you if applicable:  o Picture ID and insurance, Medicare or Medicaid cards  o Co-pay/deductible required by insurance (cash, check, credit card)  o Copy of advance directive, living will or power-of- documents if not brought to PAT  o CPAP or BIPAP mask and tubing  o Relaxation aids ( book, magazine), etc.  o Hearing aids                        ON THE DAY OF SURGERY  · On the day of surgery check in at registration located at the main entrance of the hospital.   ? You will be registered and given a beeper with instructions where to wait in the main lobby.  ? When your beeper lights up and vibrates a member of the Outpatient Surgery staff will meet you at the double doors under the stair steps and escort you to your preoperative room.   · You may have cloth compression devices placed on your legs. These help to prevent blood clots and reduce swelling in your legs.  · An IV may be inserted into one of your veins.  · In the operating room, you may be given one or more of the following:  ? A medicine to help you relax (sedative).  ? A medicine to numb the area (local anesthetic).  ? A medicine to make you fall asleep (general anesthetic).  ? A medicine that is injected into an area of your body to numb everything below the  "injection site (regional anesthetic).  · Your surgical site will be marked or identified.  · You may be given an antibiotic through your IV to help prevent infection.  Contact a health care provider if you:  · Develop a fever of more than 100.4°F (38°C) or other feelings of illness during the 48 hours before your surgery.  · Have symptoms that get worse.  Have questions or concerns about your surgery    General Anesthesia/Surgery, Adult  General anesthesia is the use of medicines to make a person \"go to sleep\" (unconscious) for a medical procedure. General anesthesia must be used for certain procedures, and is often recommended for procedures that:  · Last a long time.  · Require you to be still or in an unusual position.  · Are major and can cause blood loss.  The medicines used for general anesthesia are called general anesthetics. As well as making you unconscious for a certain amount of time, these medicines:  · Prevent pain.  · Control your blood pressure.  · Relax your muscles.  Tell a health care provider about:  · Any allergies you have.  · All medicines you are taking, including vitamins, herbs, eye drops, creams, and over-the-counter medicines.  · Any problems you or family members have had with anesthetic medicines.  · Types of anesthetics you have had in the past.  · Any blood disorders you have.  · Any surgeries you have had.  · Any medical conditions you have.  · Any recent upper respiratory, chest, or ear infections.  · Any history of:  ? Heart or lung conditions, such as heart failure, sleep apnea, asthma, or chronic obstructive pulmonary disease (COPD).  ?  service.  ? Depression or anxiety.  · Any tobacco or drug use, including marijuana or alcohol use.  · Whether you are pregnant or may be pregnant.  What are the risks?  Generally, this is a safe procedure. However, problems may occur, including:  · Allergic reaction.  · Lung and heart problems.  · Inhaling food or liquid from the stomach " into the lungs (aspiration).  · Nerve injury.  · Air in the bloodstream, which can lead to stroke.  · Extreme agitation or confusion (delirium) when you wake up from the anesthetic.  · Waking up during your procedure and being unable to move. This is rare.  These problems are more likely to develop if you are having a major surgery or if you have an advanced or serious medical condition. You can prevent some of these complications by answering all of your health care provider's questions thoroughly and by following all instructions before your procedure.  General anesthesia can cause side effects, including:  · Nausea or vomiting.  · A sore throat from the breathing tube.  · Hoarseness.  · Wheezing or coughing.  · Shaking chills.  · Tiredness.  · Body aches.  · Anxiety.  · Sleepiness or drowsiness.  · Confusion or agitation.  RISKS AND COMPLICATIONS OF SURGERY  Your health care provider will discuss possible risks and complications with you before surgery. Common risks and complications include:    · Problems due to the use of anesthetics.  · Blood loss and replacement (does not apply to minor surgical procedures).  · Temporary increase in pain due to surgery.  · Uncorrected pain or problems that the surgery was meant to correct.  · Infection.  · New damage.    What happens before the procedure?    Medicines  Ask your health care provider about:  · Changing or stopping your regular medicines. This is especially important if you are taking diabetes medicines or blood thinners.  · Taking medicines such as aspirin and ibuprofen. These medicines can thin your blood. Do not take these medicines unless your health care provider tells you to take them.  · Taking over-the-counter medicines, vitamins, herbs, and supplements. Do not take these during the week before your procedure unless your health care provider approves them.  General instructions  · Starting 3-6 weeks before the procedure, do not use any products that  contain nicotine or tobacco, such as cigarettes and e-cigarettes. If you need help quitting, ask your health care provider.  · If you brush your teeth on the morning of the procedure, make sure to spit out all of the toothpaste.  · Tell your health care provider if you become ill or develop a cold, cough, or fever.  · If instructed by your health care provider, bring your sleep apnea device with you on the day of your surgery (if applicable).  · Ask your health care provider if you will be going home the same day, the following day, or after a longer hospital stay.  ? Plan to have someone take you home from the hospital or clinic.  ? Plan to have a responsible adult care for you for at least 24 hours after you leave the hospital or clinic. This is important.  What happens during the procedure?  · You will be given anesthetics through both of the following:  ? A mask placed over your nose and mouth.  ? An IV in one of your veins.  · You may receive a medicine to help you relax (sedative).  · After you are unconscious, a breathing tube may be inserted down your throat to help you breathe. This will be removed before you wake up.  · An anesthesia specialist will stay with you throughout your procedure. He or she will:  ? Keep you comfortable and safe by continuing to give you medicines and adjusting the amount of medicine that you get.  ? Monitor your blood pressure, pulse, and oxygen levels to make sure that the anesthetics do not cause any problems.  The procedure may vary among health care providers and hospitals.  What happens after the procedure?  · Your blood pressure, temperature, heart rate, breathing rate, and blood oxygen level will be monitored until the medicines you were given have worn off.  · You will wake up in a recovery area. You may wake up slowly.  · If you feel anxious or agitated, you may be given medicine to help you calm down.  · If you will be going home the same day, your health care provider  may check to make sure you can walk, drink, and urinate.  · Your health care provider will treat any pain or side effects you have before you go home.  · Do not drive for 24 hours if you were given a sedative.  Summary  · General anesthesia is used to keep you still and prevent pain during a procedure.  · It is important to tell your healthcare provider about your medical history and any surgeries you have had, and previous experience with anesthesia.  · Follow your healthcare provider’s instructions about when to stop eating, drinking, or taking certain medicines before your procedure.  · Plan to have someone take you home from the hospital or clinic.  This information is not intended to replace advice given to you by your health care provider. Make sure you discuss any questions you have with your health care provider.  Document Released: 03/26/2009 Document Revised: 08/03/2018 Document Reviewed: 08/03/2018  [employer] Interactive Patient Education © 2019 [employer]       Fall Prevention in Hospitals, Adult  As a hospital patient, your condition and the treatments you receive can increase your risk for falls. Some additional risk factors for falls in a hospital include:  · Being in an unfamiliar environment.  · Being on bed rest.  · Your surgery.  · Taking certain medicines.  · Your tubing requirements, such as intravenous (IV) therapy or catheters.  It is important that you learn how to decrease fall risks while at the hospital. Below are important tips that can help prevent falls.  SAFETY TIPS FOR PREVENTING FALLS  Talk about your risk of falling.  · Ask your health care provider why you are at risk for falling. Is it your medicine, illness, tubing placement, or something else?  · Make a plan with your health care provider to keep you safe from falls.  · Ask your health care provider or pharmacist about side effects of your medicines. Some medicines can make you dizzy or affect your coordination.  Ask for  help.  · Ask for help before getting out of bed. You may need to press your call button.  · Ask for assistance in getting safely to the toilet.  · Ask for a walker or cane to be put at your bedside. Ask that most of the side rails on your bed be placed up before your health care provider leaves the room.  · Ask family or friends to sit with you.  · Ask for things that are out of your reach, such as your glasses, hearing aids, telephone, bedside table, or call button.  Follow these tips to avoid falling:  · Stay lying or seated, rather than standing, while waiting for help.  · Wear rubber-soled slippers or shoes whenever you walk in the hospital.  · Avoid quick, sudden movements.  ¨ Change positions slowly.  ¨ Sit on the side of your bed before standing.  ¨ Stand up slowly and wait before you start to walk.  · Let your health care provider know if there is a spill on the floor.  · Pay careful attention to the medical equipment, electrical cords, and tubes around you.  · When you need help, use your call button by your bed or in the bathroom. Wait for one of your health care providers to help you.  · If you feel dizzy or unsure of your footing, return to bed and wait for assistance.  · Avoid being distracted by the TV, telephone, or another person in your room.  · Do not lean or support yourself on rolling objects, such as IV poles or bedside tables.     This information is not intended to replace advice given to you by your health care provider. Make sure you discuss any questions you have with your health care provider.     Document Released: 12/15/2001 Document Revised: 01/08/2016 Document Reviewed: 08/25/2013  [employer] Interactive Patient Education ©2016 [employer]       [facility]  CHG 4% Patient Instruction Sheet    Chlorhexidine Before Surgery  Chlorhexidine gluconate (CHG) is a germ-killing (antiseptic) solution that is used to clean the skin. It gets rid of the bacteria that normally live on  the skin. Cleaning your skin with CHG before surgery helps lower the risk for infection after surgery.    How to use CHG solution  · You will take 2 showers, one shower the night before surgery, the second shower the morning of surgery before coming to the hospital.  · Use CHG only as told by your health care provider, and follow the instructions on the label.  · Use CHG solution while taking a shower. Follow these steps when using CHG solution (unless your health care provider gives you different instructions):  1. Start the shower.  2. Use your normal soap and shampoo to wash your face and hair.  3. Turn off the shower or move out of the shower stream.  4. Pour the CHG onto a clean washcloth. Do not use any type of brush or rough-edged sponge.  5. Starting at your neck, lather your body down to your toes. Make sure you:  6. Pay special attention to the part of your body where you will be having surgery. Scrub this area for at least 1 minute.  7. Use the full amount of CHG as directed. Usually, this is one half bottle for each shower.  8. Do not use CHG on your head or face. If the solution gets into your ears or eyes, rinse them well with water.  9. Avoid your genital area.  10. Avoid any areas of skin that have broken skin, cuts, or scrapes.  11. Scrub your back and under your arms. Make sure to wash skin folds.  12. Let the lather sit on your skin for 1-2 minutes or as long as told by your health care  provider.  13. Thoroughly rinse your entire body in the shower. Make sure that all body creases and crevices are rinsed well.  14. Dry off with a clean towel. Do not put any substances on your body afterward, such as powder, lotion, or perfume.  15. Put on clean clothes or pajamas.  16. If it is the night before your surgery, sleep in clean sheets.    What are the risks?  Risks of using CHG include:  · A skin reaction.  · Hearing loss, if CHG gets in your ears.  · Eye injury, if CHG gets in your eyes and is not  rinsed out.  · The CHG product catching fire.  Make sure that you avoid smoking and flames after applying CHG to your skin.  Do not use CHG:  · If you have a chlorhexidine allergy or have previously reacted to chlorhexidine.  · On babies younger than 2 months of age.      On the day of surgery, when you are taken to your room in Outpatient Surgery you will be given a CHG prepackaged cloth to wipe the site for your surgery.  How to use CHG prepackaged cloths  · Follow the instructions on the label.  · Use the CHG cloth on clean, dry skin. Follow these steps when using a CHG cloth (unless your health care provider gives you different instructions):  1. Using the CHG cloth, vigorously scrub the part of your body where you will be having surgery. Scrub using a back-and-forth motion for 3 minutes. The area on your body should be completely wet with CHG when you are finished scrubbing.  2. Do not rinse. Discard the cloth and let the area air-dry for 1 minute. Do not put any substances on your body afterward, such as powder, lotion, or perfume.  Contact a health care provider if:  · Your skin gets irritated after scrubbing.  · You have questions about using your solution or cloth.  Get help right away if:  · Your eyes become very red or swollen.  · Your eyes itch badly.  · Your skin itches badly and is red or swollen.  · Your hearing changes.  · You have trouble seeing.  · You have swelling or tingling in your mouth or throat.  · You have trouble breathing.  · You swallow any chlorhexidine.  Summary  · Chlorhexidine gluconate (CHG) is a germ-killing (antiseptic) solution that is used to clean the skin. Cleaning your skin with CHG before surgery helps lower the risk for infection after surgery.  · You may be given CHG to use at home. It may be in a bottle or in a prepackaged cloth to use on your skin. Carefully follow your health care provider's instructions and the instructions on the product label.  · Do not use CHG if  you have a chlorhexidine allergy.  · Contact your health care provider if your skin gets irritated after scrubbing.  This information is not intended to replace advice given to you by your health care provider. Make sure you discuss any questions you have with your health care provider.  Document Released: 09/11/2013 Document Revised: 11/15/2018 Document Reviewed: 11/15/2018  [employer] Interactive Patient Education © 2019 [employer]

## 2021-11-05 PROBLEM — Z96.9 RETAINED ORTHOPEDIC HARDWARE: Status: ACTIVE | Noted: 2021-11-05

## 2021-11-05 NOTE — DISCHARGE INSTRUCTIONS
UPPER EXTREMITY POST-OP INSTRUCTIONS - DR. MORALES    IMPORTANT PHONE NUMBERS:  • For emergencies, please call [phone]  • You may reach Dr. Morales and clinical staff at [phone] [phone] 8:00 am-5:00 pm  • After 5pm or on the weekends, please call [phone]  • Call immediately if you have any of the following symptoms:     Elevated temperature above 101.5 degrees for more than 48 hours after surgery     Persistent drainage from wound     Severe pain around surgical site    Sling use: The sling is provided for your comfort and to ensure proper healing of your repair following surgery. Please place the abduction pillow with the curved side against your side and the sling on the side of the pillow. Your surgery requires that you wear the sling if noted below.  _x___ For comfort.  ____ At all times except bathing, dressing, and therapy. Also wear the sling during sleep.  ____ No sling required    Bathing:  ___No bandages, no restrictions!!  _x__You may remove you dressing and shower on the 3rd day after surgery (Ex. Tu surgery, shower on Friday)  ** if you are told to it is ok to remove your dressing and shower, DO NOT SOAK your incisions in a tub.  ___Keep splint clean, dry, and intact. DO NOT place foreign objects into your splint.      Dressings: Keep dressing/splint intact unless instructed otherwise below. SOME DRAINAGE IS NORMAL!    • DO NOT touch or apply ointment to the incision.    • DO NOT remove the steri-strips over the incisions (if you have steri-strips). They will         generally fall off on their own or can be removed 1 weeksafter surgery.    • If you have yellow gauze and it comes off, do not worry about it. Leave them off.   • Signs of infection that warrant a phone call to our clinical line:     o Excessive drainage or redness     o Red streaking coming away from the incision  o Increased pain  o Increased temperature above 101 degrees      Physical Therapy:        *  Your  physical therapy status will be discussed with you postoperatively and at your first post-op appointment. Some injuries will not require physical therapy.      *  If you have a shoulder manipulation, please schedule therapy for the next day      Medications: You will be discharged with the appropriate medications following your surgery. Fill these at the pharmacy and take them as directed on the label. Not all of the medications below may be prescribed. Occasionally, other medications may be prescribed with specific instructions.    Percocet/Lortab (oxycodone/hydrocodone with tylenol) - Pain Medication, will cause drowsiness, possibly itchiness (this is NOT an allergy - use benadryl or an over the counter allergy medication such as Claritin or Zyrtec)     o Take 1-2 tablets every 4-6 hours. DO NOT EXCEED 4,000mg of Tylenol in 24 hours.  **DO NOT MIX WITH ALCOHOL, DRIVE WHILE TAKING, OR TAKE with extra TYLENOL**    Colace (Docusate) - stool softener, used for constipation. Take this only if you feel constipated.      Zofran (Ondansetron) or Phenergan - Anti-nausea medication, will cause drowsiness      *Starting January 2021, all narcotic medication must be prescribed electronically to your pharmacy.  Be sure to notify nursing of your preferred pharmacy.  If you are running low on pain medications, please notify us if you need a refill 24-48 hours prior to when you run out, so we can make arrangements to refill the prescription for you if we determine is necessary      YOUR NEXT PAIN MEDICATION IS DUE AT______________         General Anesthesia, Adult, Care After  This sheet gives you information about how to care for yourself after your procedure. Your health care provider may also give you more specific instructions. If you have problems or questions, contact your health care provider.  What can I expect after the procedure?  After the procedure, the following side effects are common:  · Pain or discomfort at the  IV site.  · Nausea.  · Vomiting.  · Sore throat.  · Trouble concentrating.  · Feeling cold or chills.  · Weak or tired.  · Sleepiness and fatigue.  · Soreness and body aches. These side effects can affect parts of the body that were not involved in surgery.  Follow these instructions at home:   For at least 24 hours after the procedure:  1. Have a responsible adult stay with you. It is important to have someone help care for you until you are awake and alert.  2. Rest as needed.  3. Do not:  ? Participate in activities in which you could fall or become injured.  ? Drive.  ? Use heavy machinery.  ? Drink alcohol.  ? Take sleeping pills or medicines that cause drowsiness.  ? Make important decisions or sign legal documents.  ? Take care of children on your own.  Eating and drinking  · Follow any instructions from your health care provider about eating or drinking restrictions.  · When you feel hungry, start by eating small amounts of foods that are soft and easy to digest (bland), such as toast. Gradually return to your regular diet.  · Drink enough fluid to keep your urine pale yellow.  · If you vomit, rehydrate by drinking water, juice, or clear broth.  General instructions  1. If you have sleep apnea, surgery and certain medicines can increase your risk for breathing problems. Follow instructions from your health care provider about wearing your sleep device:  ? Anytime you are sleeping, including during daytime naps.  ? While taking prescription pain medicines, sleeping medicines, or medicines that make you drowsy.  2. Return to your normal activities as told by your health care provider. Ask your health care provider what activities are safe for you.  3. Take over-the-counter and prescription medicines only as told by your health care provider.  4. If you smoke, do not smoke without supervision.  5. Keep all follow-up visits as told by your health care provider. This is important.  Contact a health care provider  if:  · You have nausea or vomiting that does not get better with medicine.  · You cannot eat or drink without vomiting.  · You have pain that does not get better with medicine.  · You are unable to pass urine.  · You develop a skin rash.  · You have a fever.  · You have redness around your IV site that gets worse.  Get help right away if:  · You have difficulty breathing.  · You have chest pain.  · You have blood in your urine or stool, or you vomit blood.  Summary  · After the procedure, it is common to have a sore throat or nausea. It is also common to feel tired.  · Have a responsible adult stay with you for the first 24 hours after general anesthesia. It is important to have someone help care for you until you are awake and alert.  · When you feel hungry, start by eating small amounts of foods that are soft and easy to digest (bland), such as toast. Gradually return to your regular diet.  · Drink enough fluid to keep your urine pale yellow.  · Return to your normal activities as told by your health care provider. Ask your health care provider what activities are safe for you.  This information is not intended to replace advice given to you by your health care provider. Make sure you discuss any questions you have with your health care provider.  Document Revised: 12/21/2018 Document Reviewed: 08/03/2018    CALL YOUR PHYSICIAN IF YOU EXPERIENCE  INCREASED PAIN NOT HELPED BY YOUR PAIN MEDICATION.      .                                              Fall Prevention in the Home      Falls can cause injuries. They can happen to people of all ages. There are many things you can do to make your home safe and to help prevent falls.    WHAT CAN I DO ON THE OUTSIDE OF MY HOME?  · Regularly fix the edges of walkways and driveways and fix any cracks.  · Remove anything that might make you trip as you walk through a door, such as a raised step or threshold.  · Trim any bushes or trees on the path to your home.  · Use bright  outdoor lighting.  · Clear any walking paths of anything that might make someone trip, such as rocks or tools.  · Regularly check to see if handrails are loose or broken. Make sure that both sides of any steps have handrails.  · Any raised decks and porches should have guardrails on the edges.  · Have any leaves, snow, or ice cleared regularly.  · Use sand or salt on walking paths during winter.  · Clean up any spills in your garage right away. This includes oil or grease spills.  WHAT CAN I DO IN THE BATHROOM?    · Use night lights.  · Install grab bars by the toilet and in the tub and shower. Do not use towel bars as grab bars.  · Use non-skid mats or decals in the tub or shower.  · If you need to sit down in the shower, use a plastic, non-slip stool.  · Keep the floor dry. Clean up any water that spills on the floor as soon as it happens.  · Remove soap buildup in the tub or shower regularly.  · Attach bath mats securely with double-sided non-slip rug tape.  · Do not have throw rugs and other things on the floor that can make you trip.  WHAT CAN I DO IN THE BEDROOM?  · Use night lights.  · Make sure that you have a light by your bed that is easy to reach.  · Do not use any sheets or blankets that are too big for your bed. They should not hang down onto the floor.  · Have a firm chair that has side arms. You can use this for support while you get dressed.  · Do not have throw rugs and other things on the floor that can make you trip.  WHAT CAN I DO IN THE KITCHEN?  · Clean up any spills right away.  · Avoid walking on wet floors.  · Keep items that you use a lot in easy-to-reach places.  · If you need to reach something above you, use a strong step stool that has a grab bar.  · Keep electrical cords out of the way.  · Do not use floor polish or wax that makes floors slippery. If you must use wax, use non-skid floor wax.  · Do not have throw rugs and other things on the floor that can make you trip.  WHAT CAN I DO  WITH MY STAIRS?  · Do not leave any items on the stairs.  · Make sure that there are handrails on both sides of the stairs and use them. Fix handrails that are broken or loose. Make sure that handrails are as long as the stairways.  · Check any carpeting to make sure that it is firmly attached to the stairs. Fix any carpet that is loose or worn.  · Avoid having throw rugs at the top or bottom of the stairs. If you do have throw rugs, attach them to the floor with carpet tape.  · Make sure that you have a light switch at the top of the stairs and the bottom of the stairs. If you do not have them, ask someone to add them for you.  WHAT ELSE CAN I DO TO HELP PREVENT FALLS?  · Wear shoes that:  ¨ Do not have high heels.  ¨ Have rubber bottoms.  ¨ Are comfortable and fit you well.  ¨ Are closed at the toe. Do not wear sandals.  · If you use a stepladder:  ¨ Make sure that it is fully opened. Do not climb a closed stepladder.  ¨ Make sure that both sides of the stepladder are locked into place.  ¨ Ask someone to hold it for you, if possible.  · Clearly cristina and make sure that you can see:  ¨ Any grab bars or handrails.  ¨ First and last steps.  ¨ Where the edge of each step is.  · Use tools that help you move around (mobility aids) if they are needed. These include:  ¨ Canes.  ¨ Walkers.  ¨ Scooters.  ¨ Crutches.  · Turn on the lights when you go into a dark area. Replace any light bulbs as soon as they burn out.  · Set up your furniture so you have a clear path. Avoid moving your furniture around.  · If any of your floors are uneven, fix them.  · If there are any pets around you, be aware of where they are.  · Review your medicines with your doctor. Some medicines can make you feel dizzy. This can increase your chance of falling.  Ask your doctor what other things that you can do to help prevent falls.     This information is not intended to replace advice given to you by your health care provider. Make sure you discuss  any questions you have with your health care provider.     Document Released: 10/14/2010 Document Revised: 05/03/2016 Document Reviewed: 01/22/2016  [employer] Interactive Patient Education ©2016 [employer]     PATIENT/FAMILY/RESPONSIBLE PARTY VERBALIZES UNDERSTANDING OF ABOVE EDUCATION.  COPY OF PAIN SCALE GIVEN AND REVIEWED WITH VERBALIZED UNDERSTANDING.    What to expect after a Nerve Block  Nerve blocks administered to block pain affect many types of nerves, including those nerves that control movement, pain, and normal sensation.  Following a nerve block, you may notice some bruising at the site where the block was given.  You may experience sensations such as:  numbness of the affected area or limb, tingling, heaviness (that is the limb feels heavy to you), weakness or inability to move the affected arm or leg, or a feeling as if your arm or leg has “fallen asleep”.    A nerve block can last from 9-18 hours depending on the medications used.  Usually the weakness wears off first followed by the tingling and heaviness.  As the block wears off, you may begin to notice pain; however, this sequence of events may occur in any order.  Typically, you will be able to move your limb before you will feel it.  Once a nerve block begins to wear off, the effects are usually completely gone within 60 minutes.    If you experience continued side effects that you believe are block related for longer than 48 hours, please call your healthcare provider.  Please see block-specific instructions below.    Instructions for any block involving the shoulder or arm  • If you have had any kind of shoulder/arm block, you will go home with your arm in a sling.  Wear the sling until the block has completely worn off.  You may be required to wear it for a longer period of time per your surgeon’s recommendations.  • I you have had a shoulder/arm block; it is a good idea to sleep on a recliner with pillows under your arm.    Note:  If you  have severe or prolonged shortness of breath, please seek medical assistance as soon as possible.    Protection of a “blocked” arm (limb)  • After a nerve block, you cannot feel pain, pressure, or extremes of temperature in the affected limb.  And because of this, your blocked limb is at more risk for injury.  For example, it is possible to burn your limb on an extremely hot surface without feeling it.  • When resting, it is important to reposition your limb periodically to avoid prolonged pressure on it.  This may require the use of pillows and padding.  • While sleeping, you should avoid rolling onto the affected limb or putting too much pressure on it.  • If you have a cast or tight dressing, check the color of your fingers of the affected limb.  Call your surgeon if they look discolored (that is, dusky, dark colored)  • Use caution in cold weather.  Cover your limb appropriately to protect it from the cold.  Pain Management  Your surgeon will give you a prescription for pain medication.  Begin taking this before the nerve block wears off.  Bear in mind that sometimes the block can wear off in the middle of the night.

## 2021-11-05 NOTE — OP NOTE
Patient Name: Gayatri  MRN: [id number]  : 1965    DATE of SURGERY: 2021    SURGEON: Nixon Garcia MD    ASSISTANT: Rudi Villavicencio PA-C, was used as an assistant during this procedure and was utilized during patient positioning, wound exposure, wound closure, and postoperative dressing application.    PREOPERATIVE DIAGNOSES: Retained orthopaedic hardware, left clavicle     POSTOPERATIVE DIAGNOSES: Retained orthopaedic hardware, left clavicle    PROCEDURES PERFORMED: Removal of hardware (deep implant) Left clavicle     IMPLANTS:  none    ANESTHESIA USED:  General endotracheal anesthesia.     INDICATIONS:  The patient is a 56 y.o. female who underwent an ORIF of her left distal clavicle with a hook plate on 8/3/2021. She healed her fracture and wished to have her hardware removed.  Risks included to that of anesthesia, bleeding, infection, pain, damage to local structures, need for further surgery, intraoperative fracture, incomplete removal of hardware.     ESTIMATED BLOOD LOSS:  Less than 20 mL.     SPECIMEN:  None.     DRAINS:  None.     COMPLICATIONS:  None.     PROCEDURE IN DETAIL:  The patient was seen in the preoperative holding room, Once again the informed consent form was reviewed with the patient and signed.  The site of surgery was marked with the patient's agreement.  The patient was transported to the operating room where a timeout was performed identifying the correct patient as well as the operative site.  Then 2 g of IV Kefzol was given as perioperative antibiotics. The Left upper extremity was prepped and draped in the usual sterile fashion.      Utilizing the previous incision, soft tissue was dissected to the level of the previously placed hardware.  All hardware was removed without complication. C-arm images were used verifying no intraoperative fracture.     My assistant was used to help remove hardware, close the incision, and place the postop dressing.    The incision  was irrigated, again, closed in layers and adhesive glue was used to close the skin.  Sterile dressing was placed.  The patient was awakened from anesthesia, transported to the recovery room in stable condition.     POSTOPERATIVE PLAN:     1) Discharge home with family  2) Weight bearing as tolerated    Electronically signed by Nixon Garcia MD on 11/9/2021 at 10:47 CST

## 2021-11-06 ENCOUNTER — LAB (OUTPATIENT)
Dept: LAB | Facility: HOSPITAL | Age: 56
End: 2021-11-06

## 2021-11-06 LAB — SARS-COV-2 ORF1AB RESP QL NAA+PROBE: NOT DETECTED

## 2021-11-06 PROCEDURE — C9803 HOPD COVID-19 SPEC COLLECT: HCPCS | Performed by: ORTHOPAEDIC SURGERY

## 2021-11-06 PROCEDURE — U0004 COV-19 TEST NON-CDC HGH THRU: HCPCS | Performed by: ORTHOPAEDIC SURGERY

## 2021-11-09 ENCOUNTER — ANESTHESIA (OUTPATIENT)
Dept: PERIOP | Facility: HOSPITAL | Age: 56
End: 2021-11-09

## 2021-11-09 ENCOUNTER — APPOINTMENT (OUTPATIENT)
Dept: GENERAL RADIOLOGY | Facility: HOSPITAL | Age: 56
End: 2021-11-09

## 2021-11-09 ENCOUNTER — HOSPITAL ENCOUNTER (OUTPATIENT)
Facility: HOSPITAL | Age: 56
Setting detail: SURGERY ADMIT
Discharge: HOME | End: 2021-11-09
Attending: ORTHOPAEDIC SURGERY | Admitting: ORTHOPAEDIC SURGERY

## 2021-11-09 ENCOUNTER — ANESTHESIA EVENT (OUTPATIENT)
Dept: PERIOP | Facility: HOSPITAL | Age: 56
End: 2021-11-09

## 2021-11-09 VITALS
OXYGEN SATURATION: 100 % | RESPIRATION RATE: 16 BRPM | SYSTOLIC BLOOD PRESSURE: 110 MMHG | TEMPERATURE: 97.6 F | DIASTOLIC BLOOD PRESSURE: 66 MMHG | HEART RATE: 57 BPM

## 2021-11-09 DIAGNOSIS — Z96.9 RETAINED ORTHOPEDIC HARDWARE: Primary | ICD-10-CM

## 2021-11-09 PROCEDURE — 25010000002 DEXAMETHASONE PER 1 MG: Performed by: ANESTHESIOLOGY

## 2021-11-09 PROCEDURE — 73000 X-RAY EXAM OF COLLAR BONE: CPT

## 2021-11-09 PROCEDURE — 0 CEFAZOLIN PER 500 MG: Performed by: ORTHOPAEDIC SURGERY

## 2021-11-09 PROCEDURE — 25010000002 DEXAMETHASONE PER 1 MG: Performed by: NURSE ANESTHETIST, CERTIFIED REGISTERED

## 2021-11-09 PROCEDURE — 76000 FLUOROSCOPY <1 HR PHYS/QHP: CPT

## 2021-11-09 PROCEDURE — 76942 ECHO GUIDE FOR BIOPSY: CPT | Performed by: ORTHOPAEDIC SURGERY

## 2021-11-09 PROCEDURE — 25010000002 ROPIVACAINE PER 1 MG: Performed by: ANESTHESIOLOGY

## 2021-11-09 PROCEDURE — 25010000002 MIDAZOLAM PER 1 MG: Performed by: ANESTHESIOLOGY

## 2021-11-09 PROCEDURE — 25010000002 PROPOFOL 10 MG/ML EMULSION: Performed by: NURSE ANESTHETIST, CERTIFIED REGISTERED

## 2021-11-09 PROCEDURE — 25010000002 FENTANYL CITRATE (PF) 50 MCG/ML SOLUTION: Performed by: ANESTHESIOLOGY

## 2021-11-09 PROCEDURE — 25010000002 ONDANSETRON PER 1 MG: Performed by: NURSE ANESTHETIST, CERTIFIED REGISTERED

## 2021-11-09 RX ORDER — NALOXONE HCL 0.4 MG/ML
0.4 VIAL (ML) INJECTION AS NEEDED
Status: DISCONTINUED | OUTPATIENT
Start: 2021-11-09 | End: 2021-11-09 | Stop reason: HOSPADM

## 2021-11-09 RX ORDER — LIDOCAINE HYDROCHLORIDE 10 MG/ML
0.5 INJECTION, SOLUTION EPIDURAL; INFILTRATION; INTRACAUDAL; PERINEURAL ONCE AS NEEDED
Status: DISCONTINUED | OUTPATIENT
Start: 2021-11-09 | End: 2021-11-09 | Stop reason: HOSPADM

## 2021-11-09 RX ORDER — SUCCINYLCHOLINE/SOD CL,ISO/PF 200MG/10ML
SYRINGE (ML) INTRAVENOUS AS NEEDED
Status: DISCONTINUED | OUTPATIENT
Start: 2021-11-09 | End: 2021-11-09 | Stop reason: SURG

## 2021-11-09 RX ORDER — ROCURONIUM BROMIDE 10 MG/ML
INJECTION, SOLUTION INTRAVENOUS AS NEEDED
Status: DISCONTINUED | OUTPATIENT
Start: 2021-11-09 | End: 2021-11-09 | Stop reason: SURG

## 2021-11-09 RX ORDER — ONDANSETRON 2 MG/ML
4 INJECTION INTRAMUSCULAR; INTRAVENOUS ONCE AS NEEDED
Status: DISCONTINUED | OUTPATIENT
Start: 2021-11-09 | End: 2021-11-09 | Stop reason: HOSPADM

## 2021-11-09 RX ORDER — PROPOFOL 10 MG/ML
VIAL (ML) INTRAVENOUS AS NEEDED
Status: DISCONTINUED | OUTPATIENT
Start: 2021-11-09 | End: 2021-11-09 | Stop reason: SURG

## 2021-11-09 RX ORDER — SODIUM CHLORIDE 0.9 % (FLUSH) 0.9 %
10 SYRINGE (ML) INJECTION EVERY 12 HOURS SCHEDULED
Status: DISCONTINUED | OUTPATIENT
Start: 2021-11-09 | End: 2021-11-09 | Stop reason: HOSPADM

## 2021-11-09 RX ORDER — MIDAZOLAM HYDROCHLORIDE 1 MG/ML
1 INJECTION INTRAMUSCULAR; INTRAVENOUS
Status: DISCONTINUED | OUTPATIENT
Start: 2021-11-09 | End: 2021-11-09 | Stop reason: HOSPADM

## 2021-11-09 RX ORDER — LIDOCAINE HYDROCHLORIDE 20 MG/ML
INJECTION, SOLUTION EPIDURAL; INFILTRATION; INTRACAUDAL; PERINEURAL AS NEEDED
Status: DISCONTINUED | OUTPATIENT
Start: 2021-11-09 | End: 2021-11-09 | Stop reason: SURG

## 2021-11-09 RX ORDER — SODIUM CHLORIDE, SODIUM LACTATE, POTASSIUM CHLORIDE, CALCIUM CHLORIDE 600; 310; 30; 20 MG/100ML; MG/100ML; MG/100ML; MG/100ML
9 INJECTION, SOLUTION INTRAVENOUS CONTINUOUS
Status: DISCONTINUED | OUTPATIENT
Start: 2021-11-09 | End: 2021-11-09 | Stop reason: HOSPADM

## 2021-11-09 RX ORDER — ONDANSETRON 2 MG/ML
INJECTION INTRAMUSCULAR; INTRAVENOUS AS NEEDED
Status: DISCONTINUED | OUTPATIENT
Start: 2021-11-09 | End: 2021-11-09 | Stop reason: SURG

## 2021-11-09 RX ORDER — SCOLOPAMINE TRANSDERMAL SYSTEM 1 MG/1
1 PATCH, EXTENDED RELEASE TRANSDERMAL CONTINUOUS
Status: DISCONTINUED | OUTPATIENT
Start: 2021-11-09 | End: 2021-11-09 | Stop reason: HOSPADM

## 2021-11-09 RX ORDER — SODIUM CHLORIDE, SODIUM LACTATE, POTASSIUM CHLORIDE, CALCIUM CHLORIDE 600; 310; 30; 20 MG/100ML; MG/100ML; MG/100ML; MG/100ML
100 INJECTION, SOLUTION INTRAVENOUS CONTINUOUS PRN
Status: DISCONTINUED | OUTPATIENT
Start: 2021-11-09 | End: 2021-11-09 | Stop reason: HOSPADM

## 2021-11-09 RX ORDER — PHENYLEPHRINE HCL IN 0.9% NACL 1 MG/10 ML
SYRINGE (ML) INTRAVENOUS AS NEEDED
Status: DISCONTINUED | OUTPATIENT
Start: 2021-11-09 | End: 2021-11-09 | Stop reason: SURG

## 2021-11-09 RX ORDER — DEXAMETHASONE SODIUM PHOSPHATE 4 MG/ML
INJECTION, SOLUTION INTRA-ARTICULAR; INTRALESIONAL; INTRAMUSCULAR; INTRAVENOUS; SOFT TISSUE AS NEEDED
Status: DISCONTINUED | OUTPATIENT
Start: 2021-11-09 | End: 2021-11-09 | Stop reason: SURG

## 2021-11-09 RX ORDER — DEXTROSE MONOHYDRATE 25 G/50ML
12.5 INJECTION, SOLUTION INTRAVENOUS AS NEEDED
Status: DISCONTINUED | OUTPATIENT
Start: 2021-11-09 | End: 2021-11-09 | Stop reason: HOSPADM

## 2021-11-09 RX ORDER — IBUPROFEN 600 MG/1
600 TABLET ORAL ONCE AS NEEDED
Status: DISCONTINUED | OUTPATIENT
Start: 2021-11-09 | End: 2021-11-09 | Stop reason: HOSPADM

## 2021-11-09 RX ORDER — FENTANYL CITRATE 50 UG/ML
25 INJECTION, SOLUTION INTRAMUSCULAR; INTRAVENOUS
Status: DISCONTINUED | OUTPATIENT
Start: 2021-11-09 | End: 2021-11-09 | Stop reason: HOSPADM

## 2021-11-09 RX ORDER — LABETALOL HYDROCHLORIDE 5 MG/ML
5 INJECTION, SOLUTION INTRAVENOUS
Status: DISCONTINUED | OUTPATIENT
Start: 2021-11-09 | End: 2021-11-09 | Stop reason: HOSPADM

## 2021-11-09 RX ORDER — SODIUM CHLORIDE 0.9 % (FLUSH) 0.9 %
10 SYRINGE (ML) INJECTION AS NEEDED
Status: DISCONTINUED | OUTPATIENT
Start: 2021-11-09 | End: 2021-11-09 | Stop reason: HOSPADM

## 2021-11-09 RX ORDER — DEXAMETHASONE SODIUM PHOSPHATE 4 MG/ML
4 INJECTION, SOLUTION INTRA-ARTICULAR; INTRALESIONAL; INTRAMUSCULAR; INTRAVENOUS; SOFT TISSUE ONCE AS NEEDED
Status: COMPLETED | OUTPATIENT
Start: 2021-11-09 | End: 2021-11-09

## 2021-11-09 RX ORDER — ROPIVACAINE HYDROCHLORIDE 5 MG/ML
INJECTION, SOLUTION EPIDURAL; INFILTRATION; PERINEURAL
Status: COMPLETED | OUTPATIENT
Start: 2021-11-09 | End: 2021-11-09

## 2021-11-09 RX ORDER — ONDANSETRON 4 MG/1
4 TABLET, FILM COATED ORAL EVERY 8 HOURS PRN
Qty: 10 TABLET | Refills: 0 | Status: SHIPPED | OUTPATIENT
Start: 2021-11-09 | End: 2021-12-30

## 2021-11-09 RX ORDER — HYDROCODONE BITARTRATE AND ACETAMINOPHEN 7.5; 325 MG/1; MG/1
1 TABLET ORAL EVERY 6 HOURS PRN
Qty: 20 TABLET | Refills: 0 | Status: SHIPPED | OUTPATIENT
Start: 2021-11-09 | End: 2021-12-30

## 2021-11-09 RX ORDER — FLUMAZENIL 0.1 MG/ML
0.2 INJECTION INTRAVENOUS AS NEEDED
Status: DISCONTINUED | OUTPATIENT
Start: 2021-11-09 | End: 2021-11-09 | Stop reason: HOSPADM

## 2021-11-09 RX ORDER — OXYCODONE AND ACETAMINOPHEN 10; 325 MG/1; MG/1
1 TABLET ORAL ONCE AS NEEDED
Status: DISCONTINUED | OUTPATIENT
Start: 2021-11-09 | End: 2021-11-09 | Stop reason: HOSPADM

## 2021-11-09 RX ORDER — OXYCODONE AND ACETAMINOPHEN 7.5; 325 MG/1; MG/1
2 TABLET ORAL EVERY 4 HOURS PRN
Status: DISCONTINUED | OUTPATIENT
Start: 2021-11-09 | End: 2021-11-09 | Stop reason: HOSPADM

## 2021-11-09 RX ADMIN — Medication 80 MG: at 10:16

## 2021-11-09 RX ADMIN — PROPOFOL 150 MG: 10 INJECTION, EMULSION INTRAVENOUS at 10:16

## 2021-11-09 RX ADMIN — SODIUM CHLORIDE, POTASSIUM CHLORIDE, SODIUM LACTATE AND CALCIUM CHLORIDE 100 ML/HR: 600; 310; 30; 20 INJECTION, SOLUTION INTRAVENOUS at 08:49

## 2021-11-09 RX ADMIN — Medication 200 MCG: at 10:28

## 2021-11-09 RX ADMIN — SCOPALAMINE 1 PATCH: 1 PATCH, EXTENDED RELEASE TRANSDERMAL at 09:43

## 2021-11-09 RX ADMIN — DEXAMETHASONE SODIUM PHOSPHATE 4 MG: 4 INJECTION, SOLUTION INTRA-ARTICULAR; INTRALESIONAL; INTRAMUSCULAR; INTRAVENOUS; SOFT TISSUE at 09:43

## 2021-11-09 RX ADMIN — MIDAZOLAM 1 MG: 1 INJECTION INTRAMUSCULAR; INTRAVENOUS at 09:49

## 2021-11-09 RX ADMIN — DEXAMETHASONE SODIUM PHOSPHATE 4 MG: 4 INJECTION, SOLUTION INTRA-ARTICULAR; INTRALESIONAL; INTRAMUSCULAR; INTRAVENOUS; SOFT TISSUE at 10:30

## 2021-11-09 RX ADMIN — ONDANSETRON 8 MG: 2 INJECTION INTRAMUSCULAR; INTRAVENOUS at 10:30

## 2021-11-09 RX ADMIN — ROCURONIUM BROMIDE 5 MG: 50 INJECTION INTRAVENOUS at 10:16

## 2021-11-09 RX ADMIN — LIDOCAINE HYDROCHLORIDE 60 MG: 20 INJECTION, SOLUTION EPIDURAL; INFILTRATION; INTRACAUDAL; PERINEURAL at 10:16

## 2021-11-09 RX ADMIN — SODIUM CHLORIDE 1 G: 9 INJECTION, SOLUTION INTRAVENOUS at 10:24

## 2021-11-09 RX ADMIN — ROPIVACAINE HYDROCHLORIDE 20 ML: 5 INJECTION, SOLUTION EPIDURAL; INFILTRATION; PERINEURAL at 09:54

## 2021-11-09 RX ADMIN — FENTANYL CITRATE 25 MCG: 50 INJECTION INTRAMUSCULAR; INTRAVENOUS at 09:49

## 2021-11-09 NOTE — BRIEF OP NOTE
SHOULDER HARDWARE REMOVAL  Progress Note    Jyoti Pleitez  11/9/2021    Pre-op Diagnosis:   Z96.9       Post-Op Diagnosis Codes:     * Retained orthopedic hardware [Z96.9]    Procedure/CPT® Codes:  DE REMOVAL DEEP IMPLANT [67746]      Procedure(s):  REMOVAL OF HARDWARE LEFT CLAVICLE    Surgeon(s):  Nixon Garcia MD    Anesthesia: General with Block    Staff:   Circulator: Izzy Berumen RN  Scrub Person: Epi Soto         Estimated Blood Loss: minimal    Urine Voided: * No values recorded between 11/9/2021 10:12 AM and 11/9/2021 10:44 AM *    Specimens:                None          Drains: * No LDAs found *    Findings: see op note    Complications: none          Nixon Garcia MD     Date: 11/9/2021  Time: 10:46 CST

## 2021-11-09 NOTE — H&P
Pt Name: Jyoti Pleitez  MRN: [id number]  YOB: 1965  Date of evaluation: 11/9/2021    H&P including current review of systems was updated in the paper chart and/or the document previously scanned into the record.  There have been no significant changes or new problems since the original evaluation.  The patient's problems continue and indications for contemplated procedure have not changed.    Electronically signed by Nixon Garcia MD on 11/9/2021 at 08:53 CST

## 2021-11-09 NOTE — ANESTHESIA PREPROCEDURE EVALUATION
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

## 2021-11-09 NOTE — ANESTHESIA PROCEDURE NOTES
Peripheral Block      Patient reassessed immediately prior to procedure    Patient location during procedure: holding area  Start time: 11/9/2021 9:50 AM  Stop time: 11/9/2021 9:52 AM  Reason for block: procedure for pain, at surgeon's request, post-op pain management and Request by Dr. smith  Performed by  Anesthesiologist: Steve Leslie MD  Preanesthetic Checklist  Completed: patient identified, IV checked, site marked, risks and benefits discussed, surgical consent, monitors and equipment checked, pre-op evaluation and timeout performed  Prep:  Pt Position: supine  Sterile barriers:gloves  Prep: ChloraPrep  Patient monitoring: blood pressure monitoring, continuous pulse oximetry and EKG  Procedure    Sedation: yes    Guidance:ultrasound guided and Brachial plexus identified and local anesthetic seen surrounding nerves    ULTRASOUND INTERPRETATION. Using ultrasound guidance a 22 G gauge needle was placed in close proximity to the nerve, at which point, under ultrasound guidance anesthetic was injected in the area of the nerve and spread of the anesthesia was seen on ultrasound in close proximity thereto.  There were no abnormalities seen on ultrasound; a digital image was taken; and the patient tolerated the procedure with no complications. Images:still images obtained, printed/placed on chart (picture printed and placed in patients chart)  Loss of twitch: 0.5 mA  Laterality:left  Block Type:supraclavicular  Injection Technique:single-shot  Needle Type:echogenic  Needle Gauge:22 G      Medications Used: ropivacaine (NAROPIN) injection 0.5 %, 20 mL  Med administered at 11/9/2021 9:54 AM      Post Assessment  Injection Assessment: negative aspiration for heme, no paresthesia on injection and incremental injection  Patient Tolerance:comfortable throughout block  Complications:no

## 2021-11-09 NOTE — ANESTHESIA POSTPROCEDURE EVALUATION
Patient: Jyoti Pleitez    Procedure Summary     Date: 11/09/21 Room / Location:  [facility] OR 09 /  [facility]    Anesthesia Start: 1014 Anesthesia Stop: 1054    Procedure: REMOVAL OF HARDWARE LEFT CLAVICLE (Left Shoulder) Diagnosis:       Retained orthopedic hardware      (Z96.9)    Surgeons: Nixon Garcia MD Provider: Robin Guerrero CRNA    Anesthesia Type: general ASA Status: 2          Anesthesia Type: general    Vitals  Vitals Value Taken Time   /72 11/09/21 1131   Temp 97.6 °F (36.4 °C) 11/09/21 1131   Pulse 70 11/09/21 1133   Resp 16 11/09/21 1131   SpO2 99 % 11/09/21 1133   Vitals shown include unvalidated device data.        Post Anesthesia Care and Evaluation    PONV Status: none  Comments: Patient d/c from PACU prior to anes eval based on Rogelio score.  Please see RN notes for details of d/c criteria.    Blood pressure 112/73, pulse 69, temperature 97.6 °F (36.4 °C), resp. rate 16, SpO2 100 %, not currently breastfeeding.

## 2021-11-11 ENCOUNTER — PATIENT OUTREACH (OUTPATIENT)
Dept: CASE MANAGEMENT | Facility: OTHER | Age: 56
End: 2021-11-11

## 2021-11-11 NOTE — OUTREACH NOTE
Ambulatory Case Management Note    Final attempt. Closed case removed my name DARREL Malik RN  Ambulatory Case Management    11/11/2021, 16:17 EST

## 2021-12-10 ENCOUNTER — TRANSCRIBE ORDERS (OUTPATIENT)
Dept: PHYSICAL THERAPY | Facility: CLINIC | Age: 56
End: 2021-12-10

## 2021-12-10 DIAGNOSIS — Z96.9 RETAINED ORTHOPEDIC HARDWARE: Primary | ICD-10-CM

## 2021-12-10 DIAGNOSIS — S42.035D CLOSED NONDISPLACED FRACTURE OF ACROMIAL END OF LEFT CLAVICLE WITH ROUTINE HEALING: ICD-10-CM

## 2021-12-29 ENCOUNTER — TREATMENT (OUTPATIENT)
Dept: PHYSICAL THERAPY | Facility: CLINIC | Age: 56
End: 2021-12-29

## 2021-12-29 DIAGNOSIS — M25.512 CHRONIC LEFT SHOULDER PAIN: Primary | ICD-10-CM

## 2021-12-29 DIAGNOSIS — G89.29 CHRONIC LEFT SHOULDER PAIN: Primary | ICD-10-CM

## 2021-12-29 DIAGNOSIS — S42.035S CLOSED NONDISPLACED FRACTURE OF ACROMIAL END OF LEFT CLAVICLE, SEQUELA: ICD-10-CM

## 2021-12-29 PROCEDURE — 97162 PT EVAL MOD COMPLEX 30 MIN: CPT | Performed by: PHYSICAL THERAPIST
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
## 2021-12-30 ENCOUNTER — OFFICE VISIT (OUTPATIENT)
Dept: OBSTETRICS AND GYNECOLOGY | Facility: CLINIC | Age: 56
End: 2021-12-30

## 2021-12-30 ENCOUNTER — TELEPHONE (OUTPATIENT)
Dept: OBSTETRICS AND GYNECOLOGY | Facility: CLINIC | Age: 56
End: 2021-12-30

## 2021-12-30 VITALS
BODY MASS INDEX: 19.49 KG/M2 | HEIGHT: 65 IN | DIASTOLIC BLOOD PRESSURE: 70 MMHG | WEIGHT: 117 LBS | SYSTOLIC BLOOD PRESSURE: 98 MMHG

## 2021-12-30 DIAGNOSIS — I83.90 VARICOSE VEINS OF LOWER EXTREMITY, UNSPECIFIED LATERALITY, UNSPECIFIED WHETHER COMPLICATED: ICD-10-CM

## 2021-12-30 DIAGNOSIS — Z78.0 MENOPAUSE: Primary | ICD-10-CM

## 2021-12-30 DIAGNOSIS — R68.82 LIBIDO, DECREASED: ICD-10-CM

## 2021-12-30 PROCEDURE — 99213 OFFICE O/P EST LOW 20 MIN: CPT | Performed by: OBSTETRICS & GYNECOLOGY

## 2021-12-30 NOTE — TELEPHONE ENCOUNTER
Called and spoke with [facility] and refilled pt compounds for 3 months. Progesterone capsules, oxytocin nasal spray, and progesterone/testosterone cream. Pt notified. DELLA

## 2021-12-30 NOTE — PROGRESS NOTES
"Subjective   Chief Complaint   Patient presents with   • Menopause     pt needing refill on hormones. pt voices feeling well on them. pt voices no other concerns.      Jyoti Pleitez is a 56 y.o. year old .  No LMP recorded. Patient has had a hysterectomy.  She feeling better - she just started physical therapy for her shoulder and hopes to be able to get back to the gym soon.  Patient c/o really losing a lot of muscle strength this year.  Patient here today to be seen because she is on HRT.  Patient has now switched to having everything compounded at  Pharmacy.  Jyoti feels like HRT is currently optimized: \"I feel more like myself now\" and she also reports sex drive is better.    Finally, patient also with painful varicosity behind her knee and wants to see vascular surgeon.    The following portions of the patient's history were reviewed and updated as appropriate:current medications and allergies    Social History    Tobacco Use      Smoking status: Former Smoker        Years: 14.00        Types: Cigarettes      Smokeless tobacco: Never Used      Tobacco comment: 17 YRS AGO    Review of Systems   Constitutional: Negative for activity change and unexpected weight change.   Respiratory: Negative for shortness of breath.    Cardiovascular: Positive for chest pain (from recent surgery on clavicle.  She just came out of sling).   Endocrine: Positive for heat intolerance (since being out of hormones).   Genitourinary: Negative for vaginal bleeding.        S/p hysterectomy         Objective   BP 98/70   Ht 165.1 cm (65\")   Wt 53.1 kg (117 lb)   BMI 19.47 kg/m²     Physical Exam  Vitals and nursing note reviewed.   Constitutional:       General: She is not in acute distress.     Appearance: She is well-developed.   HENT:      Head: Normocephalic and atraumatic.   Neck:      Thyroid: No thyromegaly.   Pulmonary:      Effort: Pulmonary effort is normal.   Musculoskeletal:         General: Normal range of " motion.      Cervical back: Normal range of motion.   Skin:     General: Skin is warm and dry.   Neurological:      Mental Status: She is alert and oriented to person, place, and time.   Psychiatric:         Behavior: Behavior normal.         Judgment: Judgment normal.         Lab Review   No data reviewed    Imaging   No data reviewed     Assessment & Plan    Diagnoses and all orders for this visit:    1. Menopause (Primary): Patient very pleased with current HRT dose.  Mood, energy, and sex drive are all better.  Will call in 3-month refills on hormones today, and plan to repeat labs at the patient's next visit in 3 months    2. Varicose veins of lower extremity, unspecified laterality, unspecified whether complicated: Referral placed for patient to see Dr. Kohler    3. Libido, decreased: Improved on current dose of HRT    Patient will return to the office in 3 months      This note was electronically signed.    Rossy Georges MD  December 30, 2021  09:07 CST    Total time spent today with Jyoti  was 20-29 minutes (level 3).  Greater than 50% of the time was spent coordinating care, answering her questions and counseling regarding bone health, changes in menopause due to hormone deficiencies and pathophysiology of her presenting problem along with plans for any diagnositc work-up and treatment.

## 2022-01-03 ENCOUNTER — TREATMENT (OUTPATIENT)
Dept: PHYSICAL THERAPY | Facility: CLINIC | Age: 57
End: 2022-01-03

## 2022-01-03 DIAGNOSIS — M25.512 CHRONIC LEFT SHOULDER PAIN: Primary | ICD-10-CM

## 2022-01-03 DIAGNOSIS — G89.29 CHRONIC LEFT SHOULDER PAIN: Primary | ICD-10-CM

## 2022-01-03 DIAGNOSIS — S42.035S CLOSED NONDISPLACED FRACTURE OF ACROMIAL END OF LEFT CLAVICLE, SEQUELA: ICD-10-CM

## 2022-01-03 PROCEDURE — 97140 MANUAL THERAPY 1/> REGIONS: CPT | Performed by: PHYSICAL THERAPIST

## 2022-01-03 PROCEDURE — 97110 THERAPEUTIC EXERCISES: CPT | Performed by: PHYSICAL THERAPIST

## 2022-01-03 NOTE — PROGRESS NOTES
"                                                                Physical Therapy Treatment Note    Patient: Jyoti Pleitez                                                                     Visit Date: 1/3/2022  :     1965    Referring practitioner:    Rudi Villavicencio PA-C  Date of Initial Visit:          Type: THERAPY  Noted: 2021    Patient seen for 2 sessions    Visit Diagnoses:    ICD-10-CM ICD-9-CM   1. Chronic left shoulder pain  M25.512 719.41    G89.29 338.29   2. Closed nondisplaced fracture of acromial end of left clavicle, sequela  S42.035S 905.2     SUBJECTIVE     Subjective She reports no issues following eval or since. She has difficulty lying in prone d/t back issues and following child birth. Also unable to lie on L side. She is sleeping \"good for my age\". She was only able to do her exercises one time since eval, but has had no issues with them. She has a new gym membership and would like to get back to being independent with this. She hasn't really exercised in 2 years since COVID hit and she had to care for her dad. Reports hx of neck issues. She is not currently under any precautions or restrictions that she is aware of at this time.     PAIN: 1/10 > 0/10 (\"ROM is definitely better\")     OBJECTIVE     Objective      Manual Therapy     48747  Comments   Passive L UE flexion, abduction, ER HL   Thoracic PA's leaned against table Gr 2 -- very hypomobile in upper-mid thoracic       Timed Minutes 17     Therapeutic Exercises    53460 Comments   B UE phasic  2 x 10    Ball presses with 45 cm physioball 2 x 10    B oblique ball presses with 45 cm physioball 2 x 10 ea   HL B SA punches 2 x 10    B UE multi-directional perturbations  2 x 30 sec ea   L UE sleeper stretch into IR        Timed Minutes 24     Therapy Education/Self Care 74898   Details: Provided [employer] code (see below), POC    Given Home Exercise Program  [employer] HEP (access code [email or web address])  postural retraining "   Progress: Reinforced   Education provided to:  Patient   Level of understanding Verbalized   Timed Minutes      Access Code: [email or web address]  Date: 01/03/2022  Prepared by: Bee Nick    Exercises  Seated Shoulder Flexion Towel Slide at Table Top - 1-2 x daily - 7 x weekly - 2 sets - 10 reps  Supine Shoulder Internal Rotation Stretch - 1-2 x daily - 7 x weekly - 2 sets - 10 reps    Total Timed Treatment:     41   mins  Total Time of Visit:             43   mins         ASSESSMENT/PLAN     GOALS  Goals                                          Progress Note due by 1/28/2022  Recert Due: 3/26/2022    STG by: 6 weeks Comments Status Date   Improved thoracic mobility to allow more normal mechanics at each shoulder.   ongoing     Able to find comfortable sleeping position to reduce loss of sleep.   ongoing     Bilateral arm elevation equal.   ongoing     LTG by: 12 weeks         Able to reach behind back with L hand to mid-thoracic vertebra.   ongoing     Able to dress and bathe with no increase in shoulder pain.   ongoing     Improve QUICK DASH score by 5  29.55 on 12/29/2021 (eval) ongoing 12/29/21   Independent with comprehensive HEP Provided with [employer] code and written printout today. Pt reports no issues w/ this ongoing 1/3/22   Reaching and overhead activities without increased pain.    ongoing      Assessment/Plan     ASSESSMENT: No goals have been met at this time; however, this was her first visit since her initial evaluation. Her PROM was fair and she was not very guarded with this, which surprised me, and reported improved ROM following. She does demonstrated decreased stability and strength throughout shoulder and postural muscles, which is to be expected. Initiated light AAROM and stabilization exercises today, to which she tolerated very well and reported no pain throughout session. She is very motivated to return to [facility] and would like to be able to return to her gym independently by end of POC.     PLAN:  If she wears a looser shirt which allows for better access, consider KT tape for shoulder stability and STM to L UT and deltoids. Continue to encourage good ROM and progress with postural/core strengthening and stabilization as tolerated. Consider bolstering her HEP to reflect, if appropriate.     SIGNATURE: Bee Nick PTA, License #: [id number]    Electronically Signed on 1/3/2022        [address] [place] [address]. [address]  [phone]

## 2022-01-06 ENCOUNTER — TREATMENT (OUTPATIENT)
Dept: PHYSICAL THERAPY | Facility: CLINIC | Age: 57
End: 2022-01-06

## 2022-01-06 DIAGNOSIS — M25.512 CHRONIC LEFT SHOULDER PAIN: Primary | ICD-10-CM

## 2022-01-06 DIAGNOSIS — G89.29 CHRONIC LEFT SHOULDER PAIN: Primary | ICD-10-CM

## 2022-01-06 DIAGNOSIS — S42.035S CLOSED NONDISPLACED FRACTURE OF ACROMIAL END OF LEFT CLAVICLE, SEQUELA: ICD-10-CM

## 2022-01-06 PROCEDURE — 97140 MANUAL THERAPY 1/> REGIONS: CPT | Performed by: PHYSICAL THERAPIST

## 2022-01-06 PROCEDURE — 97110 THERAPEUTIC EXERCISES: CPT | Performed by: PHYSICAL THERAPIST

## 2022-01-06 NOTE — PROGRESS NOTES
Physical Therapy Treatment Note    Patient: Jyoti Pleitez                                                                     Visit Date: 2022  :     1965    Referring practitioner:    Rudi Villavicencio PA-C  Date of Initial Visit:          Type: THERAPY  Noted: 2021    Patient seen for 3 sessions    Visit Diagnoses:    ICD-10-CM ICD-9-CM   1. Chronic left shoulder pain  M25.512 719.41    G89.29 338.29   2. Closed nondisplaced fracture of acromial end of left clavicle, sequela  S42.035S 905.2     SUBJECTIVE     Subjective She feels okay, just annoyed that she has to constantly pull up her bra strap because it falls off. She was surprised that a 2 lb dumbbell was heavy. Her pain hasn't been that bad.     PAIN: 0/10 > 0/10      OBJECTIVE     Objective      Manual Therapy     50300  Comments   STM w/ massage cream to L deltoid, pec minor, and biceps supine   L GH posterior mobilizations         Timed Minutes 20     Therapeutic Exercises    74637 Comments   L UE isometric ball presses into small ball and wall: flexion, abd, ER, and IR 2x10 each    L UE AAROM flexion stretch over ball bowing to table: flexion and abd 2x10 each       Applied sureprep and KT tape to L shoulder Deltoid assist   Timed Minutes 25     Therapy Education/Self Care 43479   Details: Educated pt on natural impingement of IR and promote activities w/ ER    Given Home Exercise Program  [employer] HEP (access code [email or web address])  postural retraining   Progress: Reinforced   Education provided to:  Patient   Level of understanding Verbalized   Timed Minutes      Access Code: [email or web address]  Date: 2022  Prepared by: Bee Nick    Exercises  Seated Shoulder Flexion Towel Slide at Table Top - 1-2 x daily - 7 x weekly - 2 sets - 10 reps  Supine Shoulder Internal Rotation Stretch - 1-2 x daily - 7 x weekly - 2 sets - 10 reps    Total Timed Treatment:     45   mins  Total Time  of Visit:             45   mins         ASSESSMENT/PLAN     GOALS  Goals                                          Progress Note due by 1/28/2022  Recert Due: 3/26/2022    STG by: 6 weeks Comments Status Date   Improved thoracic mobility to allow more normal mechanics at each shoulder.   ongoing     Able to find comfortable sleeping position to reduce loss of sleep.   ongoing     Bilateral arm elevation equal.   ongoing     LTG by: 12 weeks         Able to reach behind back with L hand to mid-thoracic vertebra.  She still has difficulty removing her bra ongoing  1/06/22   Able to dress and bathe with no increase in shoulder pain.   ongoing     Improve QUICK DASH score by 5  29.55 on 12/29/2021 (eval) ongoing 12/29/21   Independent with comprehensive HEP Provided with [employer] code and written printout today. Pt reports no issues w/ this ongoing 1/3/22   Reaching and overhead activities without increased pain.    ongoing      Assessment/Plan     ASSESSMENT: She responded very well to gentle isometrics. She found them challenging but not hurtful. Her L proximal UE was guarded but not very tender except for her pec minor.     PLAN: Consider bolstering her HEP to reflect today's session. Assess long term response to the taping. Continue to work on improving her L proximal UE strength and ROM.     SIGNATURE: Cecy Wilder PTA, License #: [id number]    Electronically Signed on 1/6/2022        Karl [address]  [place] [address]  [phone]

## 2022-01-19 ENCOUNTER — TREATMENT (OUTPATIENT)
Dept: PHYSICAL THERAPY | Facility: CLINIC | Age: 57
End: 2022-01-19

## 2022-01-19 DIAGNOSIS — M25.512 CHRONIC LEFT SHOULDER PAIN: Primary | ICD-10-CM

## 2022-01-19 DIAGNOSIS — G89.29 CHRONIC LEFT SHOULDER PAIN: Primary | ICD-10-CM

## 2022-01-19 DIAGNOSIS — S42.035S CLOSED NONDISPLACED FRACTURE OF ACROMIAL END OF LEFT CLAVICLE, SEQUELA: ICD-10-CM

## 2022-01-19 PROCEDURE — 97110 THERAPEUTIC EXERCISES: CPT

## 2022-01-19 NOTE — PROGRESS NOTES
"                                                                Physical Therapy Treatment Note    Patient: Jyoti Pleitez                                                                     Visit Date: 2022  :     1965    Referring practitioner:    Rudi Villavicencio PA-C  Date of Initial Visit:          Type: THERAPY  Noted: 2021    Patient seen for 4 sessions    Visit Diagnoses:    ICD-10-CM ICD-9-CM   1. Chronic left shoulder pain  M25.512 719.41    G89.29 338.29   2. Closed nondisplaced fracture of acromial end of left clavicle, sequela  S42.035S 905.2     SUBJECTIVE     Subjective She has been very unmotivated the last week d/t COVID, she has been extremely fatigued. Sometimes, if she falls asleep on her L side, she will wake up and it'll feel \"caught\", but is no longer having sleep deprivation as a result of pain. Reports improved overall. She has not had any pain within the last week. She feels KT tape helped her and felt more aware of shoulder position/posture while wearing it.    PAIN: 0/10 > 0/10      OBJECTIVE     Objective        Therapeutic Exercises    43934 Comments   L UE isometric ball presses with BL ball: flexion, extension, ER, and IR 2 x 10 ea   Sleep stretch into ER and IR  1 x 10 ea   Table slides into flexion and scaption  2 x 10 ea   L shoulder AROM Flex: 138 deg  Abduction: 94 deg  ER: 65 deg   IR: 76 deg (L2)   R shoulder AROM Flexion: 154 deg  Abduction: 137 deg   L shoulder ROM from EVAL: Left Shoulder   Flexion: 105 degrees   Abduction: 74 degrees   External rotation BTH: C2   Internal rotation BTB: sacrum   Applied sureprep and KT tape to L shoulder Deltoid assist and postural correction piece   Timed Minutes 42     Therapy Education/Self Care 99247   Details: Bolstered today, see below   Given Home Exercise Program  [employer] HEP (access code [email or web address])  postural retraining   Progress: New and Reinforced   Education provided to:  Patient   Level of understanding " Verbalized and Demonstrated   Timed Minutes      Access Code: [email or web address]  Date: 01/19/2022  Prepared by: Bee Nick    Exercises  Seated Shoulder Flexion Towel Slide at Table Top - 1-2 x daily - 7 x weekly - 2 sets - 10 reps  Supine Shoulder Internal Rotation Stretch - 1-2 x daily - 7 x weekly - 2 sets - 10 reps  Sleeper Stretch - 1-2 x daily - 7 x weekly - 2 sets - 10 reps    Total Timed Treatment:     42   mins  Total Time of Visit:             45   mins         ASSESSMENT/PLAN     GOALS  Goals                                          Progress Note due by 1/28/2022                                                              Recert Due: 3/26/2022    STG by: 6 weeks Comments Status Date   Improved thoracic mobility to allow more normal mechanics at each shoulder.   ongoing     Able to find comfortable sleeping position to reduce loss of sleep. She is able to find a comfortable sleeping position without loss of sleep.  met 1/19/22   Bilateral arm elevation equal. L shoulder flex AROM: 138 deg  R shoulder flex AROM: 154 deg ongoing 1/19/22   LTG by: 12 weeks         Able to reach behind back with L hand to mid-thoracic vertebra. She still has difficulty removing her bra ongoing  1/06/22   Able to dress and bathe with no increase in shoulder pain. She reports being able to do so without any increase in pain.  met 1/19/22   Improve QUICK DASH score by 5  29.55 on 12/29/2021 (eval) ongoing 12/29/21   Independent with comprehensive HEP Provided with [employer] code and written printout today. Pt reports no issues w/ this ongoing 1/3/22   Reaching and overhead activities without increased pain.   ongoing      Assessment/Plan     ASSESSMENT: She met two of her goals today as her pain has been nearly non-existant and can now better perform ADLs. She continues to be limited with internal and external rotation, especially as this is the most painful, but ROM has also improved since eval. She continues to respond very well to  isometric strengthening and will continue to progress toward AAROM and AROM strengthening as appropriate.     PLAN: Continue to work on improving her L proximal UE strength and ROM, especially IR.    SIGNATURE: Bee Nick PTA, License #: [id number]    Electronically Signed on 1/19/2022        [address] [place] [address]  [phone]

## 2022-01-24 ENCOUNTER — TREATMENT (OUTPATIENT)
Dept: PHYSICAL THERAPY | Facility: CLINIC | Age: 57
End: 2022-01-24

## 2022-01-24 DIAGNOSIS — M25.512 CHRONIC LEFT SHOULDER PAIN: Primary | ICD-10-CM

## 2022-01-24 DIAGNOSIS — G89.29 CHRONIC LEFT SHOULDER PAIN: Primary | ICD-10-CM

## 2022-01-24 DIAGNOSIS — S42.035S CLOSED NONDISPLACED FRACTURE OF ACROMIAL END OF LEFT CLAVICLE, SEQUELA: ICD-10-CM

## 2022-01-24 PROCEDURE — 97110 THERAPEUTIC EXERCISES: CPT | Performed by: PHYSICAL THERAPIST

## 2022-01-24 PROCEDURE — 97140 MANUAL THERAPY 1/> REGIONS: CPT | Performed by: PHYSICAL THERAPIST

## 2022-01-24 NOTE — PROGRESS NOTES
Progress Note Addendum      Patient: Jyoti Pleitez           : 1965  Visit Date: 2022  Referring practitioner: Rudi Villavicencio PA-C  Date of Initial Visit: Type: THERAPY  Noted: 2021  Patient seen for 5 sessions  Visit Diagnoses:    ICD-10-CM ICD-9-CM   1. Chronic left shoulder pain  M25.512 719.41    G89.29 338.29   2. Closed nondisplaced fracture of acromial end of left clavicle, sequela  S42.035S 905.2       PT G-Codes  Outcome Measure Options: Quick DASH  Quick DASH Score: 25  Clinical Progress: improved  Home Program Compliance: Yes  Progress toward previous goals: Partially Met  Prognosis to achieve goals: good    Objective     Assessment/Plan    I spent 8 minutes with the patient and Bee Nick PTA and reviewed their progress and plan of care. The patient is in agreement with this plan.   Goals                                          Progress Note due by 2022                                                              Recert Due: 3/26/2022     STG by: 6 weeks Comments Status Date   Improved thoracic mobility to allow more normal mechanics at each shoulder.  thoracic mobility remains hypomobile.  ongoing 22   Able to find comfortable sleeping position to reduce loss of sleep. She is able to find a comfortable sleeping position without loss of sleep.  met 22   Bilateral arm elevation equal. 22:  L shoulder flex AROM: 138 deg  R shoulder flex AROM: 154 deg  22:  L shoulder flex AROM: 137 deg  R shoulder flex AROM: 163 deg  ongoing 22   LTG by: 12 weeks         Able to reach behind back with L hand to mid-thoracic vertebra. Able to reach lower thoracic ongoing 22   Able to dress and bathe with no increase in shoulder pain. She reports being able to do so without any increase in pain.  met 22   Improve QUICK DASH score by 5  29.55 on 2021 (eval)   25 on 2022 ongoing 22   Independent with comprehensive HEP She reports she has not  "been very compliant d/t exhaustion from COVID ongoing 1/24/22   Reaching and overhead activities without increased pain.  \"Stiff\" but not painful with overhead lifting and reaching. She was fearful but not painful when lifting 5 lbs. Mechanics need improvement.  Partially met; ongoing 1/24/22   Pt demonstrates good lifting mechanics to prevent injury in the future.  New        SIGNATURE: Ishmael Wagner, PT DPT, License #: [id number]  Electronically Signed on 1/24/2022      "
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
## 2022-01-26 ENCOUNTER — TELEPHONE (OUTPATIENT)
Dept: VASCULAR SURGERY | Facility: CLINIC | Age: 57
End: 2022-01-26

## 2022-01-26 NOTE — TELEPHONE ENCOUNTER
Spoke with Mrs Daltonmayda reminding her of her appointment for Thursday, January 27th, 2022 at 8 am with Dr Kohler.  Maik confirmed she would be here.

## 2022-01-27 ENCOUNTER — OFFICE VISIT (OUTPATIENT)
Dept: VASCULAR SURGERY | Facility: CLINIC | Age: 57
End: 2022-01-27

## 2022-01-27 VITALS
OXYGEN SATURATION: 98 % | DIASTOLIC BLOOD PRESSURE: 70 MMHG | HEIGHT: 65 IN | SYSTOLIC BLOOD PRESSURE: 114 MMHG | HEART RATE: 64 BPM | WEIGHT: 114 LBS | BODY MASS INDEX: 18.99 KG/M2

## 2022-01-27 DIAGNOSIS — I87.323 CHRONIC VENOUS HYPERTENSION WITH INFLAMMATION INVOLVING BOTH SIDES: Primary | ICD-10-CM

## 2022-01-27 PROCEDURE — 99214 OFFICE O/P EST MOD 30 MIN: CPT | Performed by: NURSE PRACTITIONER

## 2022-01-27 RX ORDER — MULTIPLE VITAMINS W/ MINERALS TAB 9MG-400MCG
1 TAB ORAL DAILY
COMMUNITY

## 2022-01-27 RX ORDER — PROGESTERONE 100 MG/1
100 CAPSULE ORAL DAILY
COMMUNITY
End: 2022-10-06

## 2022-01-27 RX ORDER — TRIAMCINOLONE ACETONIDE 55 UG/1
2 SPRAY, METERED NASAL DAILY
COMMUNITY
End: 2022-05-03
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
## 2022-01-28 ENCOUNTER — TRANSCRIBE ORDERS (OUTPATIENT)
Dept: ADMINISTRATIVE | Facility: HOSPITAL | Age: 57
End: 2022-01-28

## 2022-01-31 ENCOUNTER — TREATMENT (OUTPATIENT)
Dept: PHYSICAL THERAPY | Facility: CLINIC | Age: 57
End: 2022-01-31

## 2022-01-31 DIAGNOSIS — S42.035S CLOSED NONDISPLACED FRACTURE OF ACROMIAL END OF LEFT CLAVICLE, SEQUELA: ICD-10-CM

## 2022-01-31 DIAGNOSIS — M25.512 CHRONIC LEFT SHOULDER PAIN: Primary | ICD-10-CM

## 2022-01-31 DIAGNOSIS — G89.29 CHRONIC LEFT SHOULDER PAIN: Primary | ICD-10-CM

## 2022-01-31 PROCEDURE — 97110 THERAPEUTIC EXERCISES: CPT

## 2022-01-31 NOTE — PROGRESS NOTES
"                                                                Physical Therapy Treatment Note    Patient: Jyoti Pleitez                                                                     Visit Date: 2022  :     1965    Referring practitioner:    Rudi Villavicencio PA-C  Date of Initial Visit:          Type: THERAPY  Noted: 2021    Patient seen for 6 sessions    Visit Diagnoses:    ICD-10-CM ICD-9-CM   1. Chronic left shoulder pain  M25.512 719.41    G89.29 338.29   2. Closed nondisplaced fracture of acromial end of left clavicle, sequela  S42.035S 905.2     SUBJECTIVE     Subjective She reports no new changes or complaints. She reports with her \"priorities being scewed\" from working so much and having COVID, she's going to try and \"kick it in high gear\" and do her exercises more. She got a CT last week for spot on her hip.    PAIN: 0/10 > unchanged     OBJECTIVE     Objective     Therapeutic Exercises    02101 Comments   B UE phasic with YTB  2 x 10    L lateral raise with 1 lb DB 2 x 10    Ball presses with 45 cm physioball , seated  2 x 10    SA wall slides  2 x 10    B unilateral alternating horizontal abd with YTB 2 x 10 ea   Supine SA punches (pain with 1 lb DB) 2 x 10 ea   HL \"I's\" against YTB  2 x 10 -- reports difficult, but not painful.    L shoulder ER  72 deg today, 50 deg last session   Passive shoulder flexion, ER, abduction             Timed Minutes 45     Therapy Education/Self Care 07761   Details: Progressed to light weight and resistance (YTB)   Given Home Exercise Program  [employer] HEP (access code [email or web address])  postural retraining   Progress: Reinforced and Progressed   Education provided to:  Patient   Level of understanding Verbalized and Demonstrated   Timed Minutes      Access Code: [email or web address]  Date: 2022  Prepared by: Bee Nick    Exercises  Seated Shoulder Flexion Towel Slide at Table Top - 1-2 x daily - 7 x weekly - 2 sets - 10 reps  Supine Shoulder Internal " "Rotation Stretch - 1-2 x daily - 7 x weekly - 2 sets - 10 reps  Sleeper Stretch - 1-2 x daily - 7 x weekly - 2 sets - 10 reps  B UE Phasic with Resistance - 1-2 x daily - 7 x weekly - 2 sets - 10 reps    Total Timed Treatment:     45   mins  Total Time of Visit:             45   mins         ASSESSMENT/PLAN     GOALS  Goals                                          Progress Note due by 2/22/2022                                                              Recert Due: 3/26/2022    STG by: 6 weeks Comments Status Date   Improved thoracic mobility to allow more normal mechanics at each shoulder.  thoracic mobility remains hypomobile.  ongoing 1/24/22   Able to find comfortable sleeping position to reduce loss of sleep. She is able to find a comfortable sleeping position without loss of sleep.  met 1/19/22   Bilateral arm elevation equal. 1/19/22:  L shoulder flex AROM: 138 deg  R shoulder flex AROM: 154 deg  1/24/22:  L shoulder flex AROM: 137 deg  R shoulder flex AROM: 163 deg  ongoing 1/24/22   LTG by: 12 weeks         Able to reach behind back with L hand to mid-thoracic vertebra. L shoulder ROM improving ongoing 1/31/22   Able to dress and bathe with no increase in shoulder pain. She reports being able to do so without any increase in pain.  met 1/19/22   Improve QUICK DASH score by 5  29.55 on 12/29/2021 (eval)   25 on 1/24/2022 ongoing 1/24/22   Independent with comprehensive HEP She reports she has not been very compliant d/t exhaustion from COVID ongoing 1/24/22   Reaching and overhead activities without increased pain.  \"Stiff\" but not painful with overhead lifting and reaching. She was fearful but not painful when lifting 5 lbs. Mechanics need improvement.  Partially met; ongoing 1/24/22     Assessment/Plan     ASSESSMENT: Despite pt reported minimal compliance with her HEP, she has been able to progress with strengthening and reflected by tolerance to resisted vs isometric exercises this date without pain " increase. Her ROM is improving very well as reflected by L shoulder ER of 72 deg today vs 50 deg last session.    PLAN: Continue to address her L shoulder mobility and strength, progress shoulder/postural/core strength.    SIGNATURE: Bee Nick PTA, License #: [id number]    Electronically Signed on 1/31/2022        [address] [place] [address]. [address]  [phone]

## 2022-02-01 ENCOUNTER — TRANSCRIBE ORDERS (OUTPATIENT)
Dept: ADMINISTRATIVE | Facility: HOSPITAL | Age: 57
End: 2022-02-01

## 2022-02-01 DIAGNOSIS — R93.89 ABNORMAL CT SCAN: Primary | ICD-10-CM

## 2022-02-02 ENCOUNTER — TREATMENT (OUTPATIENT)
Dept: PHYSICAL THERAPY | Facility: CLINIC | Age: 57
End: 2022-02-02

## 2022-02-02 DIAGNOSIS — S42.035S CLOSED NONDISPLACED FRACTURE OF ACROMIAL END OF LEFT CLAVICLE, SEQUELA: ICD-10-CM

## 2022-02-02 DIAGNOSIS — G89.29 CHRONIC LEFT SHOULDER PAIN: Primary | ICD-10-CM

## 2022-02-02 DIAGNOSIS — M25.512 CHRONIC LEFT SHOULDER PAIN: Primary | ICD-10-CM

## 2022-02-02 PROCEDURE — 97140 MANUAL THERAPY 1/> REGIONS: CPT

## 2022-02-02 NOTE — PROGRESS NOTES
Physical Therapy Treatment Note    Patient: Jyoti Pleitez                                                                     Visit Date: 2022  :     1965    Referring practitioner:    Rudi Villavicencio PA-C  Date of Initial Visit:          Type: THERAPY  Noted: 2021    Patient seen for 7 sessions    Visit Diagnoses:    ICD-10-CM ICD-9-CM   1. Chronic left shoulder pain  M25.512 719.41    G89.29 338.29   2. Closed nondisplaced fracture of acromial end of left clavicle, sequela  S42.035S 905.2     SUBJECTIVE     Subjective She reports she had to take a little Tylenol after last session, but overall did fine.     PAIN: 0/10 > unchanged (fatigued and sore, not painful)     OBJECTIVE     Objective     Therapeutic Exercises    01916 Comments   B UE phasic with YTB  2 x 10    B lateral raise with 1 lb DB 2 x 10 ea -- added to HEP   Ball presses with 45 cm physioball , seated  2 x 10   SA wall slides with YTB  2 x 10 -- added to HEP (no resistance at home)   B unilateral alternating horizontal abd with YTB 2 x 10 ea   Supine SA punches (pain with 1 lb DB) 2 x 10 ea    B UE SciFit, resistance level 2, seat lvl 8 3 min fwd, 3 min bkwd        Timed Minutes 45     Therapy Education/Self Care 94622   Details: See below   Given Home Exercise Program  [employer] HEP (access code [email or web address])  postural retraining   Progress: New and Reinforced   Education provided to:  Patient   Level of understanding Verbalized and Demonstrated   Timed Minutes      Access Code: [email or web address]  Date: 2022  Prepared by: Bee Nick    Exercises  Sleeper Stretch - 1-2 x daily - 7 x weekly - 2 sets - 10 reps  B UE Phasic with Resistance - 1-2 x daily - 7 x weekly - 2 sets - 10 reps  Seated Abdominal Press into Swiss Ball - 1-2 x daily - 7 x weekly - 2 sets - 10 reps  Supine Shoulder Horizontal Abduction with Resistance - 1-2 x daily - 7 x weekly - 2 sets - 10  "reps  Serratus Activation at Wall - 1-2 x daily - 7 x weekly - 2 sets - 10 reps  Standing Single Arm Shoulder Abduction with Dumbbell - Palm Down - 1-2 x daily - 7 x weekly - 2 sets - 10 reps    Total Timed Treatment:     45   mins  Total Time of Visit:             45   mins         ASSESSMENT/PLAN     GOALS  Goals                                          Progress Note due by 2/22/2022                                                              Recert Due: 3/26/2022    STG by: 6 weeks Comments Status Date   Improved thoracic mobility to allow more normal mechanics at each shoulder.  thoracic mobility remains hypomobile.  ongoing 1/24/22   Able to find comfortable sleeping position to reduce loss of sleep. She is able to find a comfortable sleeping position without loss of sleep.  met 1/19/22   Bilateral arm elevation equal. 1/19/22:  L shoulder flex AROM: 138 deg  R shoulder flex AROM: 154 deg  1/24/22:  L shoulder flex AROM: 137 deg  R shoulder flex AROM: 163 deg  ongoing 1/24/22   LTG by: 12 weeks         Able to reach behind back with L hand to mid-thoracic vertebra. L shoulder ROM improving ongoing 1/31/22   Able to dress and bathe with no increase in shoulder pain. She reports being able to do so without any increase in pain.  met 1/19/22   Improve QUICK DASH score by 5  29.55 on 12/29/2021 (eval)   25 on 1/24/2022 ongoing 1/24/22   Independent with comprehensive HEP Bolstered today ongoing 2/2/22   Reaching and overhead activities without increased pain.  \"Stiff\" but not painful with overhead lifting and reaching. She was fearful but not painful when lifting 5 lbs. Mechanics need improvement.  Partially met; ongoing 1/24/22     Assessment/Plan     ASSESSMENT: Patient continues to present without pain and is able to tolerate increasing intensity and resistance/weight with exercises. She and her  have a gym membership, so bolstered today to reflect exercises she may do while there. She did demonstrate " increased fatigue today with low endurance at this time.    PLAN: Continue to address her L shoulder mobility and strength, progress shoulder/postural/core strength as appropriate. Consider reinforcing HEP bolstered this date.     SIGNATURE: Bee Nick PTA, License #: [id number]    Electronically Signed on 2/2/2022        [address] [place] [address]. [address]  [phone]

## 2022-02-07 ENCOUNTER — TREATMENT (OUTPATIENT)
Dept: PHYSICAL THERAPY | Facility: CLINIC | Age: 57
End: 2022-02-07

## 2022-02-07 DIAGNOSIS — G89.29 CHRONIC LEFT SHOULDER PAIN: Primary | ICD-10-CM

## 2022-02-07 DIAGNOSIS — M25.512 CHRONIC LEFT SHOULDER PAIN: Primary | ICD-10-CM

## 2022-02-07 DIAGNOSIS — S42.035S CLOSED NONDISPLACED FRACTURE OF ACROMIAL END OF LEFT CLAVICLE, SEQUELA: ICD-10-CM

## 2022-02-07 PROCEDURE — 97110 THERAPEUTIC EXERCISES: CPT

## 2022-02-07 NOTE — PROGRESS NOTES
"                                                                Physical Therapy Treatment Note    Patient: Jyoti Pleitez                                                                     Visit Date: 2022  :     1965    Referring practitioner:    Rudi Villavicencio PA-C  Date of Initial Visit:          Type: THERAPY  Noted: 2021    Patient seen for 8 sessions    Visit Diagnoses:    ICD-10-CM ICD-9-CM   1. Chronic left shoulder pain  M25.512 719.41    G89.29 338.29   2. Closed nondisplaced fracture of acromial end of left clavicle, sequela  S42.035S 905.2     SUBJECTIVE     Subjective She reports she is running a little late since her daughter did not have school, but is doing well. She reports she felt fine after last session.     PAIN: 0/10 > fatigued, not sore or painful     OBJECTIVE     Objective     Therapeutic Exercises    96127 Comments   B UE phasic with RTB  2 x 10 -- progressed from YTB    B lateral raise with 1 lb DB 2 x 10    SA wall slides with YTB  2 x 10   B unilateral alternating horizontal abd with RTB 2 x 10 -- progressed from YTB    B UE SciFit, resistance level 2, seat lvl 8 3 min fwd, 3 min bkwd   Resister L shoulder IR/ER with YTB  2 x 10 -- attempted with RTB   Seated \"I's\" anchored at floor, YTB  2 x 10        Timed Minutes 44     Therapy Education/Self Care 65151   Details: Progressed some exercises, educ on muscle recovery and enc rest breaks between sets   Given Home Exercise Program  [employer] HEP (access code [email or web address])  postural retraining   Progress: Reinforced and Progressed   Education provided to:  Patient   Level of understanding Verbalized and Demonstrated   Timed Minutes      Access Code: [email or web address]  Date: 2022  Prepared by: Bee Nick    Exercises  Sleeper Stretch - 1-2 x daily - 7 x weekly - 2 sets - 10 reps  B UE Phasic with Resistance - 1-2 x daily - 7 x weekly - 2 sets - 10 reps  Seated Abdominal Press into Swiss Ball - 1-2 x daily - 7 x weekly - " 2 sets - 10 reps  Supine Shoulder Horizontal Abduction with Resistance - 1-2 x daily - 7 x weekly - 2 sets - 10 reps  Serratus Activation at Wall - 1-2 x daily - 7 x weekly - 2 sets - 10 reps  Standing Single Arm Shoulder Abduction with Dumbbell - Palm Down - 1-2 x daily - 7 x weekly - 2 sets - 10 reps    Total Timed Treatment:     45   mins  Total Time of Visit:             45   mins         ASSESSMENT/PLAN     GOALS  Goals                                          Progress Note due by 2/22/2022                                                              Recert Due: 3/26/2022    STG by: 6 weeks Comments Status Date   Improved thoracic mobility to allow more normal mechanics at each shoulder.  thoracic mobility remains hypomobile.  ongoing 1/24/22   Able to find comfortable sleeping position to reduce loss of sleep. She is able to find a comfortable sleeping position without loss of sleep.  met 1/19/22   Bilateral arm elevation equal. 1/19/22:  L shoulder flex AROM: 138 deg  R shoulder flex AROM: 154 deg  1/24/22:  L shoulder flex AROM: 137 deg  R shoulder flex AROM: 163 deg  ongoing 1/24/22   LTG by: 12 weeks         Able to reach behind back with L hand to mid-thoracic vertebra. L shoulder ROM improving ongoing 1/31/22   Able to dress and bathe with no increase in shoulder pain. She reports being able to do so without any increase in pain.  met 1/19/22   Improve QUICK DASH score by 5  29.55 on 12/29/2021 (eval)   25 on 1/24/2022 ongoing 1/24/22   Independent with comprehensive HEP Bolstered today ongoing 2/2/22   Reaching and overhead activities without increased pain.  Focused on proper mechanics to 90 deg with light weight today. Fatigued quickly.  Partially met; ongoing 2/7/22     Assessment/Plan     ASSESSMENT: Patient was able to tolerate progression with rhomboids and mid traps today, but continues to demonstrate decreased strength in lower traps and gross B UE muscles.     PLAN: Continue functional  strengthening within available ROM, progressing AROM as able. Especially strengthen shoulder muscles/lower trap. Continue progression towards functional lifting.    SIGNATURE: Bee Nick PTA, License #: [id number]    Electronically Signed on 2/7/2022        [address] [place] [address]  [phone]

## 2022-02-09 ENCOUNTER — TREATMENT (OUTPATIENT)
Dept: PHYSICAL THERAPY | Facility: CLINIC | Age: 57
End: 2022-02-09

## 2022-02-09 DIAGNOSIS — S42.035S CLOSED NONDISPLACED FRACTURE OF ACROMIAL END OF LEFT CLAVICLE, SEQUELA: ICD-10-CM

## 2022-02-09 DIAGNOSIS — M25.512 CHRONIC LEFT SHOULDER PAIN: Primary | ICD-10-CM

## 2022-02-09 DIAGNOSIS — G89.29 CHRONIC LEFT SHOULDER PAIN: Primary | ICD-10-CM

## 2022-02-09 PROCEDURE — 97110 THERAPEUTIC EXERCISES: CPT

## 2022-02-09 NOTE — PROGRESS NOTES
"                                                                Physical Therapy Treatment Note    Patient: Jyoti Pleitez                                                                     Visit Date: 2022  :     1965    Referring practitioner:    Rudi Villavicencio PA-C  Date of Initial Visit:          Type: THERAPY  Noted: 2021    Patient seen for 9 sessions    Visit Diagnoses:    ICD-10-CM ICD-9-CM   1. Chronic left shoulder pain  M25.512 719.41    G89.29 338.29   2. Closed nondisplaced fracture of acromial end of left clavicle, sequela  S42.035S 905.2     SUBJECTIVE     Subjective She reports no new concerns or complaints. \"I actually felt like I got a workout the other day. I was like, 'Oh! This is what it's like to have muscles.. that you actually use!\"    PAIN: 0/10 > 0/10     OBJECTIVE     Objective     Therapeutic Exercises    69042 Comments   B lateral raise, standing  2 x 10 (1 lb DB), 1 x 10 (2 lb DB)   wall slides with focus on flexion ROM  x15   B UE SciFit, resistance level 3.0, seat lvl 8 5 min fwd, 5 min bckwd   Shoulder flexion AROM  R: 162 deg and L: 136 deg   Shoulder ER/IR AROM  (L) ER: 60 deg and IR: 60 deg  (R) ER: 80 deg and IR: 72 deg   Applied sure prep and KT tape to R elbow in forked pattern, anchored at distal tricep, 2.5 squares Unbilled time: 3 min       Timed Minutes 40     Therapy Education/Self Care 63850   Details: Focused on endurance training, bursitis in elbow   Given Home Exercise Program  [employer] HEP (access code [email or web address])  postural retraining   Progress: Reinforced   Education provided to:  Patient   Level of understanding Verbalized and Demonstrated   Timed Minutes      Access Code: [email or web address]  Date: 2022  Prepared by: Bee Nick    Exercises  Sleeper Stretch - 1-2 x daily - 7 x weekly - 2 sets - 10 reps  B UE Phasic with Resistance - 1-2 x daily - 7 x weekly - 2 sets - 10 reps  Seated Abdominal Press into Swiss Ball - 1-2 x daily - 7 x weekly - " 2 sets - 10 reps  Supine Shoulder Horizontal Abduction with Resistance - 1-2 x daily - 7 x weekly - 2 sets - 10 reps  Serratus Activation at Wall - 1-2 x daily - 7 x weekly - 2 sets - 10 reps  Standing Single Arm Shoulder Abduction with Dumbbell - Palm Down - 1-2 x daily - 7 x weekly - 2 sets - 10 reps    Total Timed Treatment:     40   mins  Total Time of Visit:             43   mins         ASSESSMENT/PLAN     GOALS  Goals                                          Progress Note due by 2/22/2022                                                              Recert Due: 3/26/2022    STG by: 6 weeks Comments Status Date   Improved thoracic mobility to allow more normal mechanics at each shoulder.  thoracic mobility remains hypomobile.  ongoing 1/24/22   Able to find comfortable sleeping position to reduce loss of sleep. She is able to find a comfortable sleeping position without loss of sleep.  met 1/19/22   Bilateral arm elevation equal. 1/19/22:  L shoulder flex AROM: 138 deg  R shoulder flex AROM: 154 deg  1/24/22:  L shoulder flex AROM: 137 deg  R shoulder flex AROM: 163 deg   2/9/22:   L shoulder flex AROM: 136 deg (pain free)  R shoulder flex AROM: 162 deg (pain free) ongoing 2/9/22   LTG by: 12 weeks         Able to reach behind back with L hand to mid-thoracic vertebra. L IR: 60 deg and R: 72 deg. Able to reach T9-T10 without pain. improved  ongoing 2/9/22   Able to dress and bathe with no increase in shoulder pain. She reports being able to do so without any increase in pain.  met 1/19/22   Improve QUICK DASH score by 5  29.55 on 12/29/2021 (eval)   25 on 1/24/2022 ongoing 1/24/22   Independent with comprehensive HEP Bolstered today ongoing 2/2/22   Reaching and overhead activities without increased pain.  Focused on proper mechanics to 90 deg with light weight today. Fatigued quickly.  Partially met; ongoing 2/7/22     Assessment/Plan     ASSESSMENT: Focused on endurance today, to which pt demonstrated  improvements. Her pain-free ROM is improving, as is her mobility. She is likely ready to begin progressing towards functional lifting mechanics; however, she continues to require improvements with internal rotation. Additionally, she continues to c/o R elbow pain with s/s consistent with bursitis. Utilized KT tape for pain-relief with good results today and encouraged her to contact her PCP if pain continues to persist.     PLAN: Continue functional strengthening within available ROM, progressing AROM as able. Consider focus on IR ROM without compensation and continue to progress towards functional lifting mechanics.     SIGNATURE: Bee Nick PTA, License #: [id number]    Electronically Signed on 2/9/2022        [address] [place] [address]  [phone]

## 2022-02-11 ENCOUNTER — HOSPITAL ENCOUNTER (OUTPATIENT)
Dept: NUCLEAR MEDICINE | Facility: HOSPITAL | Age: 57
Discharge: HOME | End: 2022-02-11

## 2022-02-11 DIAGNOSIS — R93.89 ABNORMAL CT SCAN: ICD-10-CM

## 2022-02-11 PROCEDURE — 0 TECHNETIUM OXIDRONATE KIT: Performed by: PHYSICIAN ASSISTANT

## 2022-02-11 PROCEDURE — 78306 BONE IMAGING WHOLE BODY: CPT

## 2022-02-11 PROCEDURE — A9561 TC99M OXIDRONATE: HCPCS | Performed by: PHYSICIAN ASSISTANT

## 2022-02-11 RX ADMIN — TECHNETIUM TC 99M OXIDRONATE 1 DOSE: 3.15 INJECTION, POWDER, LYOPHILIZED, FOR SOLUTION INTRAVENOUS at 07:55

## 2022-02-15 ENCOUNTER — TREATMENT (OUTPATIENT)
Dept: PHYSICAL THERAPY | Facility: CLINIC | Age: 57
End: 2022-02-15

## 2022-02-15 DIAGNOSIS — M25.512 CHRONIC LEFT SHOULDER PAIN: Primary | ICD-10-CM

## 2022-02-15 DIAGNOSIS — S42.035S CLOSED NONDISPLACED FRACTURE OF ACROMIAL END OF LEFT CLAVICLE, SEQUELA: ICD-10-CM

## 2022-02-15 DIAGNOSIS — G89.29 CHRONIC LEFT SHOULDER PAIN: Primary | ICD-10-CM

## 2022-02-15 PROCEDURE — 97110 THERAPEUTIC EXERCISES: CPT | Performed by: PHYSICAL THERAPIST

## 2022-02-15 NOTE — PROGRESS NOTES
"                                                                Physical Therapy Treatment Note    Patient: Jyoti Pleitez                                                                     Visit Date: 2/15/2022  :     1965    Referring practitioner:    Rudi Villavicencio PA-C  Date of Initial Visit:          Type: THERAPY  Noted: 2021    Patient seen for 10 sessions    Visit Diagnoses:    ICD-10-CM ICD-9-CM   1. Chronic left shoulder pain  M25.512 719.41    G89.29 338.29   2. Closed nondisplaced fracture of acromial end of left clavicle, sequela  S42.035S 905.2     SUBJECTIVE     Subjective She feels good today and denies soreness after last session. She cleaned her whole house over the weekend without pain but was very tired after.     PAIN: 0/10 > 0/10     OBJECTIVE     Objective     Therapeutic Exercises    46624 Comments   Prone Ts over SB  2x10; 2nd set w/ 1 lb weights   Prone Is over SB  2x10; 2nd set w/ 1 lb weights   Prone Ys over SB  2x10   Praying mantis over BS  2x10   L UE static holds: flexion at 90 deg 2 x 20 sec   L UE static holds: abduction at 90 deg 2 x 20 sec    HL eccentric IR w/ 2 lb weights 2x10   HL ER w/ 1 lb weights 2x10; 2nd set w/ 2 lb weights               Timed Minutes 43     Therapy Education/Self Care 19215   Details: Educated pt on natural impingement in \"empty can\" position. Bolstered HEP.   Given Home Exercise Program  [employer] HEP (access code [email or web address])  postural retraining   Progress: Reinforced   Education provided to:  Patient   Level of understanding Verbalized and Demonstrated   Timed Minutes      Access Code: [email or web address]  URL: [email or web address]  Date: 02/15/2022  Prepared by: Cecy Wilder    Exercises  Sleeper Stretch - 1-2 x daily - 7 x weekly - 2 sets - 10 reps  B UE Phasic with Resistance - 1-2 x daily - 7 x weekly - 2 sets - 10 reps  Seated Abdominal Press into Swiss Ball - 1-2 x daily - 7 x weekly - 2 sets - 10 reps  Supine Shoulder Horizontal " Abduction with Resistance - 1-2 x daily - 7 x weekly - 2 sets - 10 reps  Serratus Activation at Wall - 1-2 x daily - 7 x weekly - 2 sets - 10 reps  Standing Single Arm Shoulder Abduction with Dumbbell - Palm Down - 1-2 x daily - 7 x weekly - 2 sets - 10 reps  Prone Scapular Retraction Arms at Side - 1-2 x daily - 7 x weekly - 2 sets - 10 reps  Prone Scapular Retraction - 1-2 x daily - 7 x weekly - 2 sets - 10 reps  Prone Scapular Retraction Y - 1-2 x daily - 7 x weekly - 2 sets - 10 reps    Total Timed Treatment:     43   mins  Total Time of Visit:             45   mins         ASSESSMENT/PLAN     GOALS  Goals                                          Progress Note due by 2/22/2022                                                              Recert Due: 3/26/2022    STG by: 6 weeks Comments Status Date   Improved thoracic mobility to allow more normal mechanics at each shoulder.  thoracic mobility remains hypomobile.  ongoing 1/24/22   Able to find comfortable sleeping position to reduce loss of sleep. She is able to find a comfortable sleeping position without loss of sleep.  met 1/19/22   Bilateral arm elevation equal. 1/19/22:  L shoulder flex AROM: 138 deg  R shoulder flex AROM: 154 deg  1/24/22:  L shoulder flex AROM: 137 deg  R shoulder flex AROM: 163 deg   2/9/22:   L shoulder flex AROM: 136 deg (pain free)  R shoulder flex AROM: 162 deg (pain free) ongoing 2/9/22   LTG by: 12 weeks         Able to reach behind back with L hand to mid-thoracic vertebra. L IR: 60 deg and R: 72 deg. Able to reach T9-T10 without pain. improved  ongoing 2/9/22   Able to dress and bathe with no increase in shoulder pain. She reports being able to do so without any increase in pain.  met 1/19/22   Improve QUICK DASH score by 5  29.55 on 12/29/2021 (eval)   25 on 1/24/2022 ongoing 1/24/22   Independent with comprehensive HEP Bolstered today ongoing 2/2/22   Reaching and overhead activities without increased pain.  Focused on proper  mechanics to 90 deg with light weight today. Fatigued quickly.  Partially met; ongoing 2/7/22     Assessment/Plan     ASSESSMENT: Today we focused on progressing her proximal strength and endurance to which she responded well to. Her HEP was bolstered to reflect.     PLAN: Assess response to today's visit and continue to improve her L UE strength, AROM, and endurance.     SIGNATURE: Cecy Wilder PTA, License #: [id number]    Electronically Signed on 2/15/2022        [address] [place] [address]. [address]  [phone]

## 2022-02-18 ENCOUNTER — TREATMENT (OUTPATIENT)
Dept: PHYSICAL THERAPY | Facility: CLINIC | Age: 57
End: 2022-02-18

## 2022-02-18 DIAGNOSIS — G89.29 CHRONIC LEFT SHOULDER PAIN: Primary | ICD-10-CM

## 2022-02-18 DIAGNOSIS — M25.512 CHRONIC LEFT SHOULDER PAIN: Primary | ICD-10-CM

## 2022-02-18 DIAGNOSIS — S42.035S CLOSED NONDISPLACED FRACTURE OF ACROMIAL END OF LEFT CLAVICLE, SEQUELA: ICD-10-CM

## 2022-02-18 PROCEDURE — 97110 THERAPEUTIC EXERCISES: CPT

## 2022-02-18 NOTE — PROGRESS NOTES
Physical Therapy Treatment Note    Patient: Jyoti Pleitez                                                                     Visit Date: 2022  :     1965    Referring practitioner:    Rudi Villavicencio PA-C  Date of Initial Visit:          Type: THERAPY  Noted: 2021    Patient seen for 11 sessions    Visit Diagnoses:    ICD-10-CM ICD-9-CM   1. Chronic left shoulder pain  M25.512 719.41    G89.29 338.29   2. Closed nondisplaced fracture of acromial end of left clavicle, sequela  S42.035S 905.2     SUBJECTIVE     Subjective She reports KT tape to her elbow helped her a lot.     PAIN: 0/10 > 0/10     OBJECTIVE     Objective     Therapeutic Exercises    49470 Comments   Prone Ts over 55 cm physioball   2 x 10    Prone Is over 55 cm physioball  2 x 10    Prone Ys over 55 cm physioball  2 x 10    Praying mantis over 55 cm physioball   2 x 10    R SL resisted ER with 1 lb DB 1 x 10 -- difficult for her    HL L shoulder ER/IR with 1 lb DB 1 x 10 ea   B unilateral lateral raises with 2 lb DB 2 x 10 ea   B UE SciFit at 3.5 resistance level, seat level 6 3 min fwd, 3 min bckwd   Applied sure prep and ROCK tape to R elbow in forked pattern, anchored at distal tricep, 2.5 squares Unbilled time: 2 min        Timed Minutes 41     Therapy Education/Self Care 27950   Details: Safe integration into gym progression   Given Home Exercise Program  [employer] HEP (access code [email or web address])  postural retraining   Progress: Reinforced   Education provided to:  Patient   Level of understanding Verbalized and Demonstrated   Timed Minutes      Access Code: [email or web address]  Date: 02/15/2022  Prepared by: Cecy Wilder    Exercises  Sleeper Stretch - 1-2 x daily - 7 x weekly - 2 sets - 10 reps  B UE Phasic with Resistance - 1-2 x daily - 7 x weekly - 2 sets - 10 reps  Seated Abdominal Press into Swiss Ball - 1-2 x daily - 7 x weekly - 2 sets - 10 reps  Supine Shoulder  Horizontal Abduction with Resistance - 1-2 x daily - 7 x weekly - 2 sets - 10 reps  Serratus Activation at Wall - 1-2 x daily - 7 x weekly - 2 sets - 10 reps  Standing Single Arm Shoulder Abduction with Dumbbell - Palm Down - 1-2 x daily - 7 x weekly - 2 sets - 10 reps  Prone Scapular Retraction Arms at Side - 1-2 x daily - 7 x weekly - 2 sets - 10 reps  Prone Scapular Retraction - 1-2 x daily - 7 x weekly - 2 sets - 10 reps  Prone Scapular Retraction Y - 1-2 x daily - 7 x weekly - 2 sets - 10 reps    Total Timed Treatment:     41   mins  Total Time of Visit:             43   mins         ASSESSMENT/PLAN     GOALS  Goals                                          Progress Note due by 2/22/2022                                                              Recert Due: 3/26/2022    STG by: 6 weeks Comments Status Date   Improved thoracic mobility to allow more normal mechanics at each shoulder.  thoracic mobility remains hypomobile.  ongoing 1/24/22   Able to find comfortable sleeping position to reduce loss of sleep. She is able to find a comfortable sleeping position without loss of sleep.  met 1/19/22   Bilateral arm elevation equal. 1/19/22:  L shoulder flex AROM: 138 deg  R shoulder flex AROM: 154 deg  1/24/22:  L shoulder flex AROM: 137 deg  R shoulder flex AROM: 163 deg   2/9/22:   L shoulder flex AROM: 136 deg (pain free)  R shoulder flex AROM: 162 deg (pain free) ongoing 2/9/22   LTG by: 12 weeks         Able to reach behind back with L hand to mid-thoracic vertebra. L IR: 60 deg and R: 72 deg on 2/9/22  Able to reach mid-thoracic vertebrae with L hand on 2/18/22 met 2/18/22   Able to dress and bathe with no increase in shoulder pain. She reports being able to do so without any increase in pain.  met 1/19/22   Improve QUICK DASH score by 5  29.55 on 12/29/2021 (eval)   25 on 1/24/2022 ongoing 1/24/22   Independent with comprehensive HEP Bolstered today ongoing 2/2/22   Reaching and overhead activities without  increased pain.  Focused on proper mechanics to 90 deg with light weight today. Fatigued quickly.  Partially met; ongoing 2/7/22     Assessment/Plan     ASSESSMENT: Patient continues to progress very well and presents consistently without pain. She continues to report pain in her elbow but is able to gain relief with KT tape, supporting pain is likely d/t bursitis vs fracture. She met another one of her eight goals today.    PLAN: Assess all goals for progress note. Review comprehensive HEP and consider placing POC on hold.     SIGNATURE: Bee Nick PTA, License #: [id number]    Electronically Signed on 2/18/2022        [address] [place] [address]. [address]  [phone]

## 2022-02-22 ENCOUNTER — TREATMENT (OUTPATIENT)
Dept: PHYSICAL THERAPY | Facility: CLINIC | Age: 57
End: 2022-02-22

## 2022-02-22 DIAGNOSIS — G89.29 CHRONIC LEFT SHOULDER PAIN: Primary | ICD-10-CM

## 2022-02-22 DIAGNOSIS — S42.035S CLOSED NONDISPLACED FRACTURE OF ACROMIAL END OF LEFT CLAVICLE, SEQUELA: ICD-10-CM

## 2022-02-22 DIAGNOSIS — M25.512 CHRONIC LEFT SHOULDER PAIN: Primary | ICD-10-CM

## 2022-02-22 PROCEDURE — 97110 THERAPEUTIC EXERCISES: CPT | Performed by: PHYSICAL THERAPIST

## 2022-02-22 NOTE — PROGRESS NOTES
Physical Therapy 30 Day Progress Note    Patient: Jyoti Pleitez                                                                     Visit Date: 2022  :     1965    Referring practitioner:    Rudi Villavicencio PA-C  Date of Initial Visit:          Type: THERAPY  Noted: 2021    Patient seen for 12 sessions    Visit Diagnoses:    ICD-10-CM ICD-9-CM   1. Chronic left shoulder pain  M25.512 719.41    G89.29 338.29   2. Closed nondisplaced fracture of acromial end of left clavicle, sequela  S42.035S 905.2     SUBJECTIVE     Subjective Pt reports that things have been going well. She has been having a little flare of the low back of late.    PAIN: 0/10 > 0/10     OBJECTIVE     Objective     Therapeutic Exercises    [id number] Comments   Overhead press with 5 lb wand 2 x 10    pec fly with 5lb dumbbell  2 x 10    5lb dumbbell bench press  2 x 10    Seated cybex pulldowns  lvl 2 2 x 10    cybex tricep extensions cybex lvl 1 1 x 10   cybex tricep pushdown cybex lvl 2 2 x 10   Standing unilateral rows on cybex lvl 2 2 x 10 each arm    Bicep curl with 5lb dumbbell  2 x 10           Timed Minutes 45     Therapy Education/Self Care [id number]   Details: Safe integration into gym progression   Given Home Exercise Program  [employer] HEP (access code [email or web address])  postural retraining   Progress: Reinforced   Education provided to:  Patient   Level of understanding Verbalized and Demonstrated   Timed Minutes      Access Code: [email or web address]  Date: 02/15/2022  Prepared by: Cecy Wilder    Exercises  Sleeper Stretch - 1-2 x daily - 7 x weekly - 2 sets - 10 reps  B UE Phasic with Resistance - 1-2 x daily - 7 x weekly - 2 sets - 10 reps  Seated Abdominal Press into Swiss Ball - 1-2 x daily - 7 x weekly - 2 sets - 10 reps  Supine Shoulder Horizontal Abduction with Resistance - 1-2 x daily - 7 x weekly - 2 sets - 10 reps  Serratus Activation at Wall - 1-2 x daily - 7 x  weekly - 2 sets - 10 reps  Standing Single Arm Shoulder Abduction with Dumbbell - Palm Down - 1-2 x daily - 7 x weekly - 2 sets - 10 reps  Prone Scapular Retraction Arms at Side - 1-2 x daily - 7 x weekly - 2 sets - 10 reps  Prone Scapular Retraction - 1-2 x daily - 7 x weekly - 2 sets - 10 reps  Prone Scapular Retraction Y - 1-2 x daily - 7 x weekly - 2 sets - 10 reps    Total Timed Treatment:     41   mins  Total Time of Visit:             43   mins         ASSESSMENT/PLAN     GOALS  Goals                                          Progress Note due by 3/24/2022                                                              Recert Due: 3/26/2022    STG by: 6 weeks Comments Status Date   Improved thoracic mobility to allow more normal mechanics at each shoulder.  improved mobility throughout  met 2/22/22   Able to find comfortable sleeping position to reduce loss of sleep. She is able to find a comfortable sleeping position without loss of sleep.  met 1/19/22   Bilateral arm elevation equal. 1/19/22:  L shoulder flex AROM: 138 deg  R shoulder flex AROM: 154 deg  1/24/22:  L shoulder flex AROM: 137 deg  R shoulder flex AROM: 163 deg   2/9/22:   L shoulder flex AROM: 136 deg (pain free)  R shoulder flex AROM: 162 deg (pain free)    2/22  L flex 153 deg, abduction 178 deg  R flex 171 deg abduction 180 deg   ongoing 2/22/22   LTG by: 12 weeks         Able to reach behind back with L hand to mid-thoracic vertebra. L IR: 60 deg and R: 72 deg on 2/9/22  Able to reach mid-thoracic vertebrae with L hand on 2/18/22 met 2/18/22   Able to dress and bathe with no increase in shoulder pain. She reports being able to do so without any increase in pain.  met 1/19/22   Improve QUICK DASH score by 5  29.55 on 12/29/2021 (eval)   25 on 1/24/2022  11.36 on 2/22 Met 2/22/22   Independent with comprehensive HEP Pt reports compliance  ongoing 2/22/22   Reaching and overhead activities without increased pain.  No pain just some weakness  reported with overhead tasks Met 2/22/22     Assessment & Plan     Assessment  Impairments: abnormal or restricted ROM, impaired physical strength, lacks appropriate home exercise program and pain with function  Functional Limitations: reaching behind back and reaching overheadPrognosis: good    Plan  Therapy options: will be seen for skilled therapy services  Planned modality interventions: low level laser therapy, dry needling and TENS  Planned therapy interventions: manual therapy, soft tissue mobilization, spinal/joint mobilization, strengthening, stretching, therapeutic activities, joint mobilization, home exercise program and functional ROM exercises  Treatment plan discussed with: patient         ASSESSMENT: Since beginning therapy she reports that she is now 80% improved. She has met all but 2 of her goals and overall is doing very well. We simulated an upper body gym routine today to assess her response and get her more comfortable with these exercises. She tolerated tasks well and at this time feels comfortable trying to return to the gym and working with her HEP.    PLAN: Place POC on hold shall she not return will D/C.     SIGNATURE: Ishmael Wagner PT DPT, License #: [id number]    Electronically Signed on 2/22/2022        [address] [place] [address] [place] [address]  [phone]

## 2022-05-02 ENCOUNTER — TELEPHONE (OUTPATIENT)
Dept: VASCULAR SURGERY | Facility: CLINIC | Age: 57
End: 2022-05-02

## 2022-05-02 NOTE — PROGRESS NOTES
"5/3/2022        Romaine Lawrence MD  [address] [facility] [place] [address]      Jyoti Pleitez  1965    Chief Complaint   Patient presents with   • Follow-up     3 Month Follow Up For Chronic venous hypertension with inflammation involving both sides. Test [id number] US pad venous lower ext bilat. Patient denies any stroke like symptoms.    • Former Smoker     Pt verified Former Smoker         Dear Romaine Lawrence MD        HPI  I had the pleasure of seeing your patient Jyoti Pleitez in the office today.    As you recall, Jyoti Pleitez is a 57 y.o.  female who you are currently following for women's health.  She was referred here for varicose veins to her lower extremities.  She is now having discomfort to the varicosities behind her knee on the left leg.  She has been wearing compressions stockings without any change of symptoms.  She denies any previous history of DVT.  She did have noninvasive testing performed today, which I did personally review.         Review of Systems   Constitutional: Negative.    HENT: Negative.    Eyes: Negative.    Respiratory: Negative.    Cardiovascular: Negative.    Gastrointestinal: Negative.    Endocrine: Negative.    Genitourinary: Negative.    Musculoskeletal: Negative.    Skin: Negative.    Allergic/Immunologic: Negative.    Neurological: Negative.    Hematological: Negative.    Psychiatric/Behavioral: Negative.        /70 (BP Location: Left arm, Patient Position: Sitting, Cuff Size: Adult)   Pulse 74   Resp 18   Ht 165.1 cm (65\")   Wt 52.2 kg (115 lb)   SpO2 95%   BMI 19.14 kg/m²   Physical Exam  Vitals and nursing note reviewed.   Constitutional:       General: She is not in acute distress.     Appearance: Normal appearance. She is well-developed and normal weight. She is not diaphoretic.   HENT:      Head: Normocephalic and atraumatic.   Eyes:      General: No scleral icterus.     Pupils: Pupils are equal, round, and reactive to light.   Neck: "      Thyroid: No thyromegaly.      Vascular: No carotid bruit or JVD.   Cardiovascular:      Rate and Rhythm: Normal rate and regular rhythm.      Pulses: Normal pulses.      Heart sounds: Normal heart sounds and S2 normal. No murmur heard.    No friction rub. No gallop.      Comments: Varicosities to lower extremities, tender behind left knee  Pulmonary:      Effort: Pulmonary effort is normal.      Breath sounds: Normal breath sounds.   Abdominal:      General: Bowel sounds are normal.      Palpations: Abdomen is soft.   Musculoskeletal:         General: Normal range of motion.      Cervical back: Normal range of motion and neck supple.   Skin:     General: Skin is warm and dry.   Neurological:      General: No focal deficit present.      Mental Status: She is alert and oriented to person, place, and time.      Cranial Nerves: No cranial nerve deficit.   Psychiatric:         Mood and Affect: Mood normal.         Behavior: Behavior normal.         Thought Content: Thought content normal.         Judgment: Judgment normal.         Diagnostic Data:  US Venous Doppler Lower Extremity Bilateral (duplex)    Result Date: 5/3/2022  Narrative: History: Swelling   Comments: Venous valvular insufficiency testing was performed in the bilateral lower extremities using duplex ultrasound with compression techniques.  The common femoral vein, superficial femoral, popliteal vein, posterior tibial vein, peroneal vein, greater saphenous vein, and lesser saphenous veins were interrogated.  On the right, the greater saphenous vein at the junction measured 7.1mm. In the mid thigh measured 2mm. Above the knee measured 2.5mm. In the mid calf measured 2.1mm. At the ankle measured 1.7mm. There is no evidence of venous insufficiency in the greater saphenous vein. The lesser saphenous vein was not visualized. There is no evidence of DVT.  On the left, the greater saphenous vein at the junction measured 6.1mm. In the mid thigh measured 2.9mm.  Above the knee measured 1.9mm. In the mid calf measured 1.8mm. At the ankle measured 1.4mm. There is greater than 0.5 seconds of reflux at the junction only. The lesser saphenous vein was not visualized. There is no evidence of DVT. There is a prominent inflamed tendon in the popliteal space on the medial side.      Impression: Impression: There is evidence of venous insufficiency in the left lower extremity greater saphenous vein at the junction only. There is also evidence of a very prominent inflamed tendon behind the left knee on the medial side.   This report was finalized on 05/03/2022 12:40 by Dr. Ritchie Kohler MD.       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 Venous (peripheral) insufficiency  I87.2 459.81   2. Tendon pain  M79.10 727.89           Plan: After thoroughly evaluating Jyoti Pleitez, I believe the best course of action is to remain conservative from vascular surgery standpoint.  I did review her testing which shows only very mild venous insufficiency at that saphenofemoral junction on the left lower extremity only.  Her pain is located behind her left knee and ultrasound did note a very inflamed tendon.  She can further discuss this with her primary care provider.  I would not recommend venous closure have her tendon evaluated.  She can wear compression stocking as needed.  She did not feel these were of significant benefit.  She can follow-up in our office as needed going forward.  The patient can continue taking their current medication regimen as  previously planned.  This was all discussed in full with complete understanding.    Thank you for allowing me to participate in the care of your patient.  Please do not hesitate with any questions or concerns.  I will keep you aware of any further encounters with Jyoti Pleitez.        Sincerely yours,         Ritchie Kohler, DO

## 2022-05-03 ENCOUNTER — HOSPITAL ENCOUNTER (OUTPATIENT)
Dept: ULTRASOUND IMAGING | Facility: HOSPITAL | Age: 57
Discharge: HOME | End: 2022-05-03
Admitting: NURSE PRACTITIONER

## 2022-05-03 ENCOUNTER — OFFICE VISIT (OUTPATIENT)
Dept: VASCULAR SURGERY | Facility: CLINIC | Age: 57
End: 2022-05-03

## 2022-05-03 VITALS
RESPIRATION RATE: 18 BRPM | HEART RATE: 74 BPM | SYSTOLIC BLOOD PRESSURE: 110 MMHG | HEIGHT: 65 IN | OXYGEN SATURATION: 95 % | BODY MASS INDEX: 19.16 KG/M2 | DIASTOLIC BLOOD PRESSURE: 70 MMHG | WEIGHT: 115 LBS

## 2022-05-03 DIAGNOSIS — I87.2 VENOUS (PERIPHERAL) INSUFFICIENCY: Primary | ICD-10-CM

## 2022-05-03 DIAGNOSIS — M79.10 TENDON PAIN: ICD-10-CM

## 2022-05-03 DIAGNOSIS — I87.323 CHRONIC VENOUS HYPERTENSION WITH INFLAMMATION INVOLVING BOTH SIDES: ICD-10-CM

## 2022-05-03 PROBLEM — M43.10 SPONDYLOLISTHESIS: Status: ACTIVE | Noted: 2017-02-15

## 2022-05-03 PROBLEM — S92.309A: Status: ACTIVE | Noted: 2022-05-03

## 2022-05-03 PROCEDURE — 99213 OFFICE O/P EST LOW 20 MIN: CPT | Performed by: SURGERY

## 2022-05-03 PROCEDURE — 93970 EXTREMITY STUDY: CPT | Performed by: SURGERY

## 2022-05-03 PROCEDURE — 93970 EXTREMITY STUDY: CPT

## 2022-05-20 ENCOUNTER — OFFICE VISIT (OUTPATIENT)
Dept: OBSTETRICS AND GYNECOLOGY | Facility: CLINIC | Age: 57
End: 2022-05-20

## 2022-05-20 VITALS
DIASTOLIC BLOOD PRESSURE: 70 MMHG | SYSTOLIC BLOOD PRESSURE: 102 MMHG | BODY MASS INDEX: 18.83 KG/M2 | HEIGHT: 65 IN | WEIGHT: 113 LBS

## 2022-05-20 DIAGNOSIS — R68.82 LIBIDO, DECREASED: ICD-10-CM

## 2022-05-20 DIAGNOSIS — G47.00 INSOMNIA, UNSPECIFIED TYPE: ICD-10-CM

## 2022-05-20 DIAGNOSIS — Z78.0 MENOPAUSE: Primary | ICD-10-CM

## 2022-05-20 PROCEDURE — 99213 OFFICE O/P EST LOW 20 MIN: CPT | Performed by: OBSTETRICS & GYNECOLOGY

## 2022-05-20 RX ORDER — AMITRIPTYLINE HYDROCHLORIDE 25 MG/1
25 TABLET, FILM COATED ORAL NIGHTLY
Qty: 30 TABLET | Refills: 5 | Status: SHIPPED | OUTPATIENT
Start: 2022-05-20

## 2022-05-20 NOTE — PROGRESS NOTES
"Subjective   Chief Complaint   Patient presents with   • Follow-up     Pt here for 5mo f/u on hormones, reports she is doing on hormones and you wanted repeat hormone labs at this appointment.  Pt has no complaints       Jyoti Pleitez is a 57 y.o. year old .  No LMP recorded. Patient has had a hysterectomy.  She feeling better.  Patient has finished physical therapy and says shoulder is feeling better.  Patient here today to be seen because she is on HRT.  Patient has switched to having everything compounded at [facility] now.  Jyoti feels like HRT is currently optimized: \"I feel more like myself now\" and she also reports sex drive is good now.    The following portions of the patient's history were reviewed and updated as appropriate:current medications and allergies    Social History    Tobacco Use      Smoking status: Former Smoker        Years: 14.00        Types: Cigarettes      Smokeless tobacco: Never Used      Tobacco comment: 17 YRS AGO    Review of Systems   Constitutional: Negative for activity change and unexpected weight change.   Respiratory: Negative for shortness of breath.    Cardiovascular: Negative for chest pain.   Endocrine: Positive for heat intolerance (despite compounded hormone cream).   Genitourinary: Negative for vaginal bleeding.        S/p hysterectomy   Psychiatric/Behavioral: Positive for sleep disturbance (takes melatonin).        Having some memory problems sometimes         Objective   /70   Ht 165.1 cm (65\")   Wt 51.3 kg (113 lb)   BMI 18.80 kg/m²     Physical Exam  Vitals and nursing note reviewed.   Constitutional:       General: She is not in acute distress.     Appearance: She is well-developed.   HENT:      Head: Normocephalic and atraumatic.   Neck:      Thyroid: No thyromegaly.   Pulmonary:      Effort: Pulmonary effort is normal.   Musculoskeletal:         General: Normal range of motion.      Cervical back: Normal range of motion.   Skin:     " General: Skin is warm and dry.   Neurological:      Mental Status: She is alert and oriented to person, place, and time.   Psychiatric:         Behavior: Behavior normal.         Judgment: Judgment normal.         Lab Review   No data reviewed    Imaging   No data reviewed     Assessment & Plan    Diagnoses and all orders for this visit:    1. Menopause (Primary): Patient very pleased with current HRT dose.  Mood, energy, and sex drive are all better.  Patient is still having vasomotor symptoms - labs today and will fax to pharmacy next week.  Suspect estrogen may need to be increased.    2. Libido, decreased: Improved on current dose of HRT    Patient will return to the office in 3 months      This note was electronically signed.    Rossy Georges MD  May 20, 2022  11:32 CDT    Total time spent today with Jyoti  was 20-29 minutes (level 3).  Greater than 50% of the time was spent coordinating care, answering her questions and counseling regarding bone health, changes in menopause due to hormone deficiencies and pathophysiology of her presenting problem along with plans for any diagnositc work-up and treatment.

## 2022-05-21 LAB
ESTRADIOL SERPL-MCNC: 6.8 PG/ML
PROGEST SERPL-MCNC: 1.8 NG/ML
TESTOST SERPL-MCNC: 30 NG/DL (ref 4–50)

## 2022-10-06 ENCOUNTER — OFFICE VISIT (OUTPATIENT)
Dept: OBSTETRICS AND GYNECOLOGY | Facility: CLINIC | Age: 57
End: 2022-10-06

## 2022-10-06 VITALS
BODY MASS INDEX: 18.99 KG/M2 | DIASTOLIC BLOOD PRESSURE: 64 MMHG | HEIGHT: 65 IN | SYSTOLIC BLOOD PRESSURE: 102 MMHG | WEIGHT: 114 LBS

## 2022-10-06 DIAGNOSIS — Z78.0 MENOPAUSE: Primary | ICD-10-CM

## 2022-10-06 DIAGNOSIS — R68.82 LIBIDO, DECREASED: ICD-10-CM

## 2022-10-06 PROCEDURE — 99213 OFFICE O/P EST LOW 20 MIN: CPT | Performed by: OBSTETRICS & GYNECOLOGY

## 2022-10-06 NOTE — PROGRESS NOTES
"Subjective   Chief Complaint   Patient presents with   • Menopause     Pt states that she is here for HRT refill. Pt is currently taking 1:1 Biest0.5mg/ Progesterone 100 mg. Pt states that she is doing well with this dose.      Jyoti Pleitez is a 57 y.o. year old .  No LMP recorded. Patient has had a hysterectomy.  She feeling ok, but frustrated with a little bit of \"brain fog\" that does not seem to have gotten better with HRT.  Patient has switched to having everything compounded at [facility] now.  Jyoti feels like HRT is currently optimized: \"I feel more like myself now\" and she also reports sex drive is good now.  She has had no hot flashes or night sweats this week - vasomotor symptoms are well-controlled.  Patient is still using some OTC lubricant, but still reports a little dryness just at initiation of intercourse - although it gets better quickly.    The following portions of the patient's history were reviewed and updated as appropriate:current medications and allergies    Social History    Tobacco Use      Smoking status: Former Smoker        Years: 14.00        Types: Cigarettes      Smokeless tobacco: Never Used      Tobacco comment: 17 YRS AGO    Review of Systems   Constitutional: Negative for activity change and unexpected weight change.   Respiratory: Negative for shortness of breath.    Cardiovascular: Negative for chest pain.   Endocrine: Positive for heat intolerance (despite compounded hormone cream).   Genitourinary: Negative for vaginal bleeding.        S/p hysterectomy   Psychiatric/Behavioral: Positive for sleep disturbance (takes melatonin).        Having some memory problems sometimes         Objective   /64   Ht 165.1 cm (65\")   Wt 51.7 kg (114 lb)   BMI 18.97 kg/m²     Physical Exam  Vitals and nursing note reviewed.   Constitutional:       General: She is not in acute distress.     Appearance: She is well-developed.   HENT:      Head: Normocephalic and " atraumatic.   Neck:      Thyroid: No thyromegaly.   Pulmonary:      Effort: Pulmonary effort is normal.   Musculoskeletal:         General: Normal range of motion.      Cervical back: Normal range of motion.   Skin:     General: Skin is warm and dry.   Neurological:      Mental Status: She is alert and oriented to person, place, and time.   Psychiatric:         Behavior: Behavior normal.         Judgment: Judgment normal.         Lab Review   No data reviewed    Imaging   No data reviewed     Assessment & Plan    Diagnoses and all orders for this visit:    1. Menopause (Primary): Patient very pleased with current HRT dose.  Mood, energy, and sex drive are all better.  Vasomotor symptoms now well-controlled.  BMI is only 19, but weight is stable and patient reports that she eats healthy, whenever she is hungry.  Discussed making sure that she is getting adequate protein.    2. Libido, decreased: Improved on current dose of HRT.  Discussed daily use of coconut oil as vaginal moisturizer.    Patient will return to the office in 3 months, but she will come in next week for fasting Lipid profile.        This note was electronically signed.    Rossy Georges MD  October 6, 2022  10:46 CDT    Total time spent today with Jyoti  was 20-29 minutes (level 3).  Greater than 50% of the time was spent coordinating care, answering her questions and counseling regarding bone health, changes in menopause due to hormone deficiencies and pathophysiology of her presenting problem along with plans for any diagnositc work-up and treatment.

## 2022-10-13 ENCOUNTER — TELEPHONE (OUTPATIENT)
Dept: OBSTETRICS AND GYNECOLOGY | Facility: CLINIC | Age: 57
End: 2022-10-13

## 2022-10-13 NOTE — TELEPHONE ENCOUNTER
Pt asking for hormone refills to be called to [facility].  Pt last seen in office on 10/6/22.  Pt states she is coming to office tomorrow for lab work.  Pt asking for refill on Biest 0.5 mg/progesterone 100mg capsule po qhs.  Pt also uses testosterone 1% apply 0.2ml to inner thigh daily.   Please advise if ok to call to pharmacy.

## 2022-10-14 ENCOUNTER — TELEPHONE (OUTPATIENT)
Dept: OBSTETRICS AND GYNECOLOGY | Facility: CLINIC | Age: 57
End: 2022-10-14

## 2022-10-14 DIAGNOSIS — Z78.0 MENOPAUSE: Primary | ICD-10-CM

## 2022-10-15 LAB
CHOLEST SERPL-MCNC: 250 MG/DL (ref 0–200)
HDLC SERPL-MCNC: 75 MG/DL (ref 40–60)
LDLC SERPL CALC-MCNC: 161 MG/DL (ref 0–100)
LDLC/HDLC SERPL: 2.11 {RATIO}
TRIGL SERPL-MCNC: 85 MG/DL (ref 0–150)
VLDLC SERPL CALC-MCNC: 14 MG/DL (ref 5–40)

## 2022-10-17 DIAGNOSIS — E78.00 ELEVATED CHOLESTEROL: Primary | ICD-10-CM

## 2022-10-17 RX ORDER — ATORVASTATIN CALCIUM 10 MG/1
10 TABLET, FILM COATED ORAL DAILY
Qty: 90 TABLET | Refills: 1 | Status: SHIPPED | OUTPATIENT
Start: 2022-10-17 | End: 2023-01-23 | Stop reason: SINTOL

## 2022-10-17 NOTE — PROGRESS NOTES
The patient's cholesterol has really jumped since it was checked several years ago.  Please let her know that I recommend starting a low-dose statin and rechecking in 4 months.  If patient is agreeable to this, please send 10 mg of Lipitor to her pharmacy.  Thanks

## 2022-11-10 ENCOUNTER — TRANSCRIBE ORDERS (OUTPATIENT)
Dept: ADMINISTRATIVE | Facility: HOSPITAL | Age: 57
End: 2022-11-10

## 2022-11-10 ENCOUNTER — TELEPHONE (OUTPATIENT)
Dept: OBSTETRICS AND GYNECOLOGY | Facility: CLINIC | Age: 57
End: 2022-11-10

## 2022-11-10 DIAGNOSIS — Z12.31 ENCOUNTER FOR SCREENING MAMMOGRAM FOR MALIGNANT NEOPLASM OF BREAST: Primary | ICD-10-CM

## 2022-11-10 NOTE — TELEPHONE ENCOUNTER
Caller: Jyoti Pleitez    Relationship: Self    Best call back number:[phone]    What orders are you requesting (i.e. lab or imaging): ORDER FOR MAMMOGRAM    In what timeframe would the patient need to come in: APPT ALREADY SCHEDULED FOR 11/22/22 AT 2:45    Where will you receive your lab/imaging services: [facility]    Additional notes: PATIENT CALLED TO SCHEDULE HER MAMMO AND WAS ASKED TO HAVE THE ORDERS SENT OVER.    PATIENT IS AVAILABLE FOR CALL BACK ANYTIME AT NUMBER LISTED ABOVE.

## 2022-11-22 ENCOUNTER — HOSPITAL ENCOUNTER (OUTPATIENT)
Dept: MAMMOGRAPHY | Facility: HOSPITAL | Age: 57
Discharge: HOME | End: 2022-11-22
Admitting: OBSTETRICS & GYNECOLOGY

## 2022-11-22 DIAGNOSIS — Z12.31 ENCOUNTER FOR SCREENING MAMMOGRAM FOR MALIGNANT NEOPLASM OF BREAST: ICD-10-CM

## 2022-11-22 PROCEDURE — 77067 SCR MAMMO BI INCL CAD: CPT

## 2022-11-22 PROCEDURE — 77063 BREAST TOMOSYNTHESIS BI: CPT

## 2023-01-23 ENCOUNTER — OFFICE VISIT (OUTPATIENT)
Dept: OBSTETRICS AND GYNECOLOGY | Facility: CLINIC | Age: 58
End: 2023-01-23
Payer: COMMERCIAL

## 2023-01-23 VITALS
BODY MASS INDEX: 19.83 KG/M2 | DIASTOLIC BLOOD PRESSURE: 62 MMHG | WEIGHT: 119 LBS | SYSTOLIC BLOOD PRESSURE: 110 MMHG | HEIGHT: 65 IN

## 2023-01-23 DIAGNOSIS — F41.9 ANXIETY: ICD-10-CM

## 2023-01-23 DIAGNOSIS — E78.00 ELEVATED CHOLESTEROL: ICD-10-CM

## 2023-01-23 DIAGNOSIS — Z12.31 ENCOUNTER FOR SCREENING MAMMOGRAM FOR MALIGNANT NEOPLASM OF BREAST: ICD-10-CM

## 2023-01-23 DIAGNOSIS — Z01.419 WOMEN'S ANNUAL ROUTINE GYNECOLOGICAL EXAMINATION: Primary | ICD-10-CM

## 2023-01-23 DIAGNOSIS — M85.80 OSTEOPENIA, UNSPECIFIED LOCATION: ICD-10-CM

## 2023-01-23 DIAGNOSIS — Z79.890 HORMONE REPLACEMENT THERAPY (HRT): ICD-10-CM

## 2023-01-23 DIAGNOSIS — Z78.0 POSTMENOPAUSAL: ICD-10-CM

## 2023-01-23 PROCEDURE — 99396 PREV VISIT EST AGE 40-64: CPT | Performed by: NURSE PRACTITIONER

## 2023-01-23 PROCEDURE — 99213 OFFICE O/P EST LOW 20 MIN: CPT | Performed by: NURSE PRACTITIONER

## 2023-01-23 RX ORDER — BUSPIRONE HYDROCHLORIDE 5 MG/1
5-10 TABLET ORAL 2 TIMES DAILY PRN
Qty: 45 TABLET | Refills: 2 | Status: SHIPPED | OUTPATIENT
Start: 2023-01-23

## 2023-01-23 NOTE — PROGRESS NOTES
Chief Complaint   Patient presents with   • Gynecologic Exam     Patient is here for annual GYN Exam today.  Last GYN exam 4/23/21.  [facility] BSO 2008.   Last mammogram 11/22/22 BIRADS 2, benign.  We order mammo for her.  She reports having night sweats, increased from those in the past.  Requesting refills on hormone medications.  Pt voices no other complaints or concerns at this time.   • Menopause       History:  Jyoti Pleitez is a 57 y.o. female who presents today for follow-up for evaluation of the above:    HPI     Jyoti Pleitez is a 57 y.o. female here today for annual GYN examination. She is menopausal and has not had any recent abnormal Pap smears. She denies any vaginal discharge or bleeding. She is on hormone replacement therapy.  Her last mammogram was in 11/2022 and read as BIRADS 2. She has no specific complaints today and denies abdominal or pelvic pain.             ROS:  Review of Systems   Constitutional: Negative for fatigue and unexpected weight change.   HENT: Negative.    Eyes: Negative.    Respiratory: Negative.    Cardiovascular: Negative.    Gastrointestinal: Negative for abdominal pain, constipation and diarrhea.   Endocrine: Positive for heat intolerance.   Genitourinary: Negative for difficulty urinating, dyspareunia, genital sores, menstrual problem, pelvic pain, vaginal bleeding, vaginal discharge and vaginal pain.   Musculoskeletal: Negative.    Skin: Negative.    Neurological: Negative.    Psychiatric/Behavioral: Negative.        Ms. Pleitez  reports that she has quit smoking. Her smoking use included cigarettes. She has never used smokeless tobacco. She reports that she does not currently use alcohol after a past usage of about 1.0 standard drink per week. She reports that she does not use drugs.      Current Outpatient Medications:   •  amitriptyline (ELAVIL) 25 MG tablet, Take 1 tablet by mouth Every Night., Disp: 30 tablet, Rfl: 5  •  busPIRone (BUSPAR) 5 MG tablet, Take 1-2  "tablets by mouth 2 (Two) Times a Day As Needed (anxiety)., Disp: 45 tablet, Rfl: 2  •  Estradiol-Estriol-Progesterone (Biest/Progesterone) cream, TAKE 1 CAPSULE BY MOUTH DAILY AT BEDTIME, Disp: , Rfl:   •  multivitamin with minerals tablet tablet, Take 1 tablet by mouth Daily., Disp: , Rfl:   •  NON FORMULARY, Estriol 0.1/1ml. Insert 1 gram vaginally 2-3 times weekly. compounded., Disp: , Rfl:   •  NON FORMULARY, Testosterone 1%. Apply 0.2ml (1 pump) to inner thigh or buttocks daily. Rotate site., Disp: , Rfl:   •  NON FORMULARY, 1:1 Biest 0.5mg/Progesterone 100mg. Take 1 capsule po QHS, Disp: , Rfl:       OBJECTIVE:  /62   Ht 165.1 cm (65\")   Wt 54 kg (119 lb)   BMI 19.80 kg/m²    Physical Exam  Constitutional:       Appearance: She is well-developed.   HENT:      Head: Normocephalic and atraumatic.   Eyes:      General: Lids are normal.      Conjunctiva/sclera: Conjunctivae normal.      Pupils: Pupils are equal, round, and reactive to light.   Neck:      Thyroid: No thyromegaly.   Cardiovascular:      Rate and Rhythm: Normal rate and regular rhythm.      Heart sounds: Normal heart sounds.   Pulmonary:      Effort: Pulmonary effort is normal.      Breath sounds: Normal breath sounds.   Chest:   Breasts:     Breasts are symmetrical.      Right: No inverted nipple, mass, nipple discharge, skin change or tenderness.      Left: No inverted nipple, mass, nipple discharge, skin change or tenderness.   Abdominal:      General: Bowel sounds are normal.      Palpations: Abdomen is soft.   Genitourinary:     Exam position: Supine.      Labia:         Right: No rash, tenderness, lesion or injury.         Left: No rash, tenderness, lesion or injury.       Vagina: No signs of injury and foreign body. No vaginal discharge, erythema, tenderness or bleeding.      Uterus: Absent.       Adnexa:         Right: No mass, tenderness or fullness.          Left: No mass, tenderness or fullness.        Rectum: Normal. No " tenderness or external hemorrhoid.      Comments: Uterus and cervix are surgically absent  Musculoskeletal:         General: Normal range of motion.      Cervical back: Normal range of motion and neck supple.   Skin:     General: Skin is warm and dry.   Neurological:      Mental Status: She is alert and oriented to person, place, and time.         Assessment/Plan    Diagnoses and all orders for this visit:    1. Women's annual routine gynecological examination (Primary)  -     Lipid Panel; Future  -     CBC (No Diff); Future  -     Comprehensive Metabolic Panel; Future  Immunizations:      - Tetanus: Unknown or >10 years ago. Recommend to have at pharmacy or on injury.      - Influenza: recommended annually      - Pneumovax:once after age 65      - Prevnar: Once after age 65      - Zostavax: Once after age 60  Colon Cancer Screening: Due 2025  Mammogram: Order is placed  PAP: Status post hysterectomy  DEXA: Order placed, osteopenia  COVID vaccine information is available at vaccine.[place].gov       2. Encounter for screening mammogram for malignant neoplasm of breast  -     Mammo screening digital tomosynthesis bilateral w CAD; Future    3. Elevated cholesterol  -     Lipid Panel; Future  ASCVD risk of 1.4%.  Discussed this risk with the patient.  Due to myalgias with statin we will discontinue at this time      4. Hormone replacement therapy (HRT)  -     Testosterone - total; Future  PDMP is reviewed and is appropriate at this time.  [facility] will be notified of available refills for hormone replacement therapy.      5. Anxiety  -     busPIRone (BUSPAR) 5 MG tablet; Take 1-2 tablets by mouth 2 (Two) Times a Day As Needed (anxiety).  Dispense: 45 tablet; Refill: 2  Stable on current therapy      Diagnosis Plan   1. Women's annual routine gynecological examination  Lipid Panel    CBC (No Diff)    Comprehensive Metabolic Panel    Hepatitis C Antibody      2. Encounter for screening mammogram for malignant neoplasm  of breast  Mammo screening digital tomosynthesis bilateral w CAD      3. Elevated cholesterol  Lipid Panel      4. Hormone replacement therapy (HRT)  Testosterone - total      5. Anxiety  busPIRone (BUSPAR) 5 MG tablet      6. Osteopenia, unspecified location  dexa bone density axial      7. Postmenopausal  dexa bone density axial          She will schedule a mammogram.  She will followup in one year or sooner if needed.     An After Visit Summary was printed and given to the patient at discharge.  Return in about 1 year (around 1/23/2024) for Annual physical. Sooner if problems arise.          Promise Noble APRN. 1/23/2023   Electronically Signed

## 2023-02-20 ENCOUNTER — TELEPHONE (OUTPATIENT)
Dept: OBSTETRICS AND GYNECOLOGY | Facility: CLINIC | Age: 58
End: 2023-02-20
Payer: COMMERCIAL

## 2023-02-20 NOTE — TELEPHONE ENCOUNTER
Compounded hormones faxed on 2-14-23 to [facility]. Progesterone caps, oxytocin nasal spray, testosterone cream, biest caps. BS

## 2023-02-23 ENCOUNTER — TELEPHONE (OUTPATIENT)
Dept: OBSTETRICS AND GYNECOLOGY | Facility: CLINIC | Age: 58
End: 2023-02-23
Payer: COMMERCIAL

## 2023-03-13 DIAGNOSIS — R68.82 LIBIDO, DECREASED: Primary | ICD-10-CM

## 2023-04-19 ENCOUNTER — TELEPHONE (OUTPATIENT)
Dept: OBSTETRICS AND GYNECOLOGY | Facility: CLINIC | Age: 58
End: 2023-04-19
Payer: COMMERCIAL

## 2023-04-19 ENCOUNTER — OFFICE VISIT (OUTPATIENT)
Dept: OBSTETRICS AND GYNECOLOGY | Facility: CLINIC | Age: 58
End: 2023-04-19
Payer: COMMERCIAL

## 2023-04-19 VITALS
HEIGHT: 65 IN | SYSTOLIC BLOOD PRESSURE: 106 MMHG | WEIGHT: 122 LBS | BODY MASS INDEX: 20.33 KG/M2 | DIASTOLIC BLOOD PRESSURE: 64 MMHG

## 2023-04-19 DIAGNOSIS — Z79.890 HORMONE REPLACEMENT THERAPY (HRT): ICD-10-CM

## 2023-04-19 DIAGNOSIS — R68.82 LIBIDO, DECREASED: ICD-10-CM

## 2023-04-19 DIAGNOSIS — R39.15 URINARY URGENCY: Primary | ICD-10-CM

## 2023-04-19 DIAGNOSIS — E78.00 HYPERCHOLESTEROLEMIA: ICD-10-CM

## 2023-04-19 PROCEDURE — 99213 OFFICE O/P EST LOW 20 MIN: CPT | Performed by: OBSTETRICS & GYNECOLOGY

## 2023-04-19 RX ORDER — ATORVASTATIN CALCIUM 10 MG/1
10 TABLET, FILM COATED ORAL DAILY
Qty: 90 TABLET | Refills: 3 | Status: SHIPPED | OUTPATIENT
Start: 2023-04-19

## 2023-04-19 NOTE — PROGRESS NOTES
"Subjective   Chief Complaint   Patient presents with    Menopause     pt here today for follow up on hormones. Pt needing refills on oxytocin nasal spray, biest/progesterone caps/ estradiol cream. Pt voices no concerns.      Jyoti Pleitez is a 58 y.o. year old .  No LMP recorded. Patient has had a hysterectomy.    Patient reports urinary urgency, but no leakage.  She notes it has gotten worse slowly.  Patient drinks caffeine, but no artificial sweetener.      Lipids higher when checked at last visit.  Patient has had muscle aches with statins in the past, but cannot remember what dose she was taking.     She feeling ok, but frustrated with a little bit of \"brain fog\" that does not seem to have gotten better with HRT.  Patient has switched to having everything compounded at [facility] now.  Jyoti feels like HRT is currently optimized: \"I feel more like myself now\" and she also reports sex drive is good now.  She has had no hot flashes or night sweats this week - vasomotor symptoms are well-controlled.  Patient is still using some OTC lubricant, but still reports a little dryness just at initiation of intercourse - although it gets better quickly.    The following portions of the patient's history were reviewed and updated as appropriate:current medications and allergies    Social History    Tobacco Use      Smoking status: Former        Years: 14.00        Types: Cigarettes      Smokeless tobacco: Never      Tobacco comments: 17 YRS AGO    Review of Systems   Constitutional: Negative for activity change and unexpected weight change.   Respiratory: Negative for shortness of breath.    Cardiovascular: Negative for chest pain.   Endocrine: Positive for heat intolerance (despite compounded hormone cream).   Genitourinary: Negative for vaginal bleeding.        S/p hysterectomy   Psychiatric/Behavioral: Positive for sleep disturbance (takes melatonin).        Having some memory problems sometimes       " "  Objective   /64   Ht 165.1 cm (65\")   Wt 55.3 kg (122 lb)   BMI 20.30 kg/m²     Physical Exam  Vitals and nursing note reviewed.   Constitutional:       General: She is not in acute distress.     Appearance: She is well-developed.   HENT:      Head: Normocephalic and atraumatic.   Neck:      Thyroid: No thyromegaly.   Pulmonary:      Effort: Pulmonary effort is normal.   Musculoskeletal:         General: Normal range of motion.      Cervical back: Normal range of motion.   Skin:     General: Skin is warm and dry.   Neurological:      Mental Status: She is alert and oriented to person, place, and time.   Psychiatric:         Behavior: Behavior normal.         Judgment: Judgment normal.         Lab Review   No data reviewed    Imaging   No data reviewed     Assessment & Plan    Diagnoses and all orders for this visit:      1. Urinary urgency (Primary): Discussed dietary triggers.  Patient will try to minimize her caffeine and artificial sweeteners.  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  If behavioral modifications do not improve the patient's symptoms, we will discuss medication at her next visit.    2. Hypercholesterolemia: Patient agreeable to starting a small dose of Lipitor.  We will repeat fasting lipid profile in 3 months when the patient returns to the office  -     atorvastatin (Lipitor) 10 MG tablet; Take 1 tablet by mouth Daily.  Dispense: 90 tablet; Refill: 3    3. Libido, decreased: Significantly improved with testosterone supplementation    4. Hormone replacement therapy (HRT): Patient very pleased with current HRT dose.  Mood, energy, and sex drive are all better.  Vasomotor symptoms now well-controlled.  BMI is up to 20 now.  Patient is put on just a few pounds, and about 10 pounds from her lowest last year.       This note was electronically signed.    Rossy Georges MD  April 19, 2023  10:17 CDT    Total time spent today with Jyoti  was " 20-29 minutes (level 3).  Greater than 50% of the time was spent coordinating care, answering her questions and counseling regarding bone health, changes in menopause due to hormone deficiencies and pathophysiology of her presenting problem along with plans for any diagnositc work-up and treatment.

## 2023-09-29 NOTE — TELEPHONE ENCOUNTER
Spoke with Mrs Pleitez reminding her of her appointment for Tuesday, May 3rd, 2022. [facility] for 7 o'clock testing and follow up afterwards at 915 am with Dr Kohler. Mrs Pleitez confirmed she would be here.   
distal radius fx

## 2023-10-12 ENCOUNTER — TELEPHONE (OUTPATIENT)
Dept: OBSTETRICS AND GYNECOLOGY | Facility: CLINIC | Age: 58
End: 2023-10-12
Payer: COMMERCIAL

## 2023-10-12 NOTE — TELEPHONE ENCOUNTER
Pt called requesting a refill on her Biest. I phoned [facility], verified there was no Testosterone included in her Biest compound. Pharmacist states it is just estrogen and progesterone. Gave verbal for pt to have 30 day supply with 0 refills. Pt has an appt with Dr. Georges 10/17/23

## 2023-10-17 ENCOUNTER — OFFICE VISIT (OUTPATIENT)
Dept: OBSTETRICS AND GYNECOLOGY | Facility: CLINIC | Age: 58
End: 2023-10-17
Payer: COMMERCIAL

## 2023-10-17 ENCOUNTER — TELEPHONE (OUTPATIENT)
Dept: OBSTETRICS AND GYNECOLOGY | Facility: CLINIC | Age: 58
End: 2023-10-17
Payer: COMMERCIAL

## 2023-10-17 VITALS
WEIGHT: 120 LBS | DIASTOLIC BLOOD PRESSURE: 60 MMHG | SYSTOLIC BLOOD PRESSURE: 100 MMHG | HEIGHT: 65 IN | BODY MASS INDEX: 19.99 KG/M2

## 2023-10-17 DIAGNOSIS — Z79.890 HORMONE REPLACEMENT THERAPY (HRT): ICD-10-CM

## 2023-10-17 DIAGNOSIS — R68.82 LIBIDO, DECREASED: ICD-10-CM

## 2023-10-17 DIAGNOSIS — Z78.0 MENOPAUSE: Primary | ICD-10-CM

## 2023-10-17 DIAGNOSIS — Z78.0 MENOPAUSE: ICD-10-CM

## 2023-10-17 DIAGNOSIS — N39.0 FREQUENT UTI: Primary | ICD-10-CM

## 2023-10-17 PROCEDURE — 99213 OFFICE O/P EST LOW 20 MIN: CPT | Performed by: OBSTETRICS & GYNECOLOGY

## 2023-10-17 NOTE — PROGRESS NOTES
"Subjective   Chief Complaint   Patient presents with    Med Refill     Pt here for f/u and refill of compounded HRT. Patient states it is helping.      Jyoti Pleitez is a 58 y.o. year old .  No LMP recorded. Patient has had a hysterectomy.  She feeling ok, and says that HRT is controlling symptoms well right now.  Patient reports energy level is good.  She is not having any vasomotor symptoms.  Sex drive is good.  Patient's only complaint is rapid onset mood swings and she wonders whether that is stress or whether it is related to TBI from several years ago since she also notices quick, sharp pains in the area of the trauma (just randomly).        The following portions of the patient's history were reviewed and updated as appropriate:current medications and allergies    Social History    Tobacco Use      Smoking status: Former        Years: 14        Types: Cigarettes      Smokeless tobacco: Never      Tobacco comments: 17 YRS AGO    Review of Systems   Constitutional:  Negative for activity change and unexpected weight change.   Respiratory:  Negative for shortness of breath.    Cardiovascular:  Negative for chest pain.   Endocrine: Negative for heat intolerance (rare).   Genitourinary:  Positive for dyspareunia (mild) and urgency. Negative for vaginal bleeding.        S/p hysterectomy   Psychiatric/Behavioral:  Positive for sleep disturbance (takes melatonin).         Having some memory problems sometimes         Objective   /60   Ht 165.1 cm (65\")   Wt 54.4 kg (120 lb)   BMI 19.97 kg/m²     Physical Exam  Vitals and nursing note reviewed.   Constitutional:       General: She is not in acute distress.     Appearance: She is well-developed.   HENT:      Head: Normocephalic and atraumatic.   Neck:      Thyroid: No thyromegaly.   Pulmonary:      Effort: Pulmonary effort is normal.   Musculoskeletal:         General: Normal range of motion.      Cervical back: Normal range of motion.   Skin:     " General: Skin is warm and dry.   Neurological:      Mental Status: She is alert and oriented to person, place, and time.   Psychiatric:         Mood and Affect: Mood normal.         Behavior: Behavior normal.         Thought Content: Thought content normal.         Judgment: Judgment normal.         Lab Review   No data reviewed    Imaging   No data reviewed     Assessment & Plan    Diagnoses and all orders for this visit:    1. Menopause (Primary): Patient very pleased with current HRT dose.  Mood, energy, and sex drive are all good most of the time.  Vasomotor symptoms now well-controlled.      2. Libido, decreased: Improved on current dose of HRT.      3. Frequent UTI: Patient struggles with frequent infections that she thinks are always after intercourse.  She is already using estradiol cream vaginally. She has been taking post-coital keflex since her last visit and thinks that this helps.       This note was electronically signed.    Rossy Georges MD  October 17, 2023  14:48 CDT    Total time spent today with Jyoti  was 20-29 minutes (level 3).  Greater than 50% of the time was spent coordinating care, answering her questions and counseling regarding bone health, changes in menopause due to hormone deficiencies and pathophysiology of her presenting problem along with plans for any diagnositc work-up and treatment.

## 2023-11-28 ENCOUNTER — HOSPITAL ENCOUNTER (OUTPATIENT)
Dept: MAMMOGRAPHY | Facility: HOSPITAL | Age: 58
Discharge: HOME | End: 2023-11-28
Admitting: NURSE PRACTITIONER
Payer: COMMERCIAL

## 2023-11-28 ENCOUNTER — HOSPITAL ENCOUNTER (OUTPATIENT)
Dept: BONE DENSITY | Facility: HOSPITAL | Age: 58
Discharge: HOME | End: 2023-11-28
Payer: COMMERCIAL

## 2023-11-28 DIAGNOSIS — Z12.31 ENCOUNTER FOR SCREENING MAMMOGRAM FOR MALIGNANT NEOPLASM OF BREAST: ICD-10-CM

## 2023-11-28 DIAGNOSIS — M85.80 OSTEOPENIA, UNSPECIFIED LOCATION: ICD-10-CM

## 2023-11-28 DIAGNOSIS — Z78.0 POSTMENOPAUSAL: ICD-10-CM

## 2023-11-28 PROCEDURE — 77063 BREAST TOMOSYNTHESIS BI: CPT

## 2023-11-28 PROCEDURE — 77080 DXA BONE DENSITY AXIAL: CPT

## 2023-11-28 PROCEDURE — 77067 SCR MAMMO BI INCL CAD: CPT

## 2023-12-27 DIAGNOSIS — F41.9 ANXIETY: ICD-10-CM

## 2023-12-27 RX ORDER — BUSPIRONE HYDROCHLORIDE 5 MG/1
TABLET ORAL
Qty: 45 TABLET | Refills: 2 | Status: SHIPPED | OUTPATIENT
Start: 2023-12-27

## 2023-12-27 NOTE — TELEPHONE ENCOUNTER
Rx originally given by Promise Noble. Please review and advise. Pt requesting refill until her next appt with Dr Georges.

## 2024-02-16 ENCOUNTER — TELEPHONE (OUTPATIENT)
Dept: OBSTETRICS AND GYNECOLOGY | Age: 59
End: 2024-02-16
Payer: COMMERCIAL

## 2024-02-16 ENCOUNTER — OFFICE VISIT (OUTPATIENT)
Dept: OBSTETRICS AND GYNECOLOGY | Age: 59
End: 2024-02-16
Payer: COMMERCIAL

## 2024-02-16 VITALS
SYSTOLIC BLOOD PRESSURE: 106 MMHG | HEIGHT: 65 IN | WEIGHT: 126 LBS | BODY MASS INDEX: 20.99 KG/M2 | DIASTOLIC BLOOD PRESSURE: 70 MMHG

## 2024-02-16 DIAGNOSIS — R68.82 LIBIDO, DECREASED: ICD-10-CM

## 2024-02-16 DIAGNOSIS — Z78.0 MENOPAUSE: ICD-10-CM

## 2024-02-16 DIAGNOSIS — Z79.890 HORMONE REPLACEMENT THERAPY (HRT): Primary | ICD-10-CM

## 2024-02-17 LAB
ALBUMIN SERPL-MCNC: 4.2 G/DL (ref 3.8–4.9)
ALBUMIN/GLOB SERPL: 1.8 {RATIO} (ref 1.2–2.2)
ALP SERPL-CCNC: 69 IU/L (ref 44–121)
ALT SERPL-CCNC: 13 IU/L (ref 0–32)
AST SERPL-CCNC: 21 IU/L (ref 0–40)
BILIRUB SERPL-MCNC: 0.4 MG/DL (ref 0–1.2)
BUN SERPL-MCNC: 13 MG/DL (ref 6–24)
BUN/CREAT SERPL: 17 (ref 9–23)
CALCIUM SERPL-MCNC: 9.3 MG/DL (ref 8.7–10.2)
CHLORIDE SERPL-SCNC: 103 MMOL/L (ref 96–106)
CHOLEST SERPL-MCNC: 259 MG/DL (ref 100–199)
CO2 SERPL-SCNC: 25 MMOL/L (ref 20–29)
CREAT SERPL-MCNC: 0.78 MG/DL (ref 0.57–1)
EGFRCR SERPLBLD CKD-EPI 2021: 89 ML/MIN/1.73
GLOBULIN SER CALC-MCNC: 2.4 G/DL (ref 1.5–4.5)
GLUCOSE SERPL-MCNC: 81 MG/DL (ref 70–99)
HDLC SERPL-MCNC: 80 MG/DL
LDLC SERPL CALC-MCNC: 168 MG/DL (ref 0–99)
POTASSIUM SERPL-SCNC: 4.8 MMOL/L (ref 3.5–5.2)
PROT SERPL-MCNC: 6.6 G/DL (ref 6–8.5)
SODIUM SERPL-SCNC: 142 MMOL/L (ref 134–144)
TESTOST SERPL-MCNC: 5 NG/DL (ref 4–50)
TRIGL SERPL-MCNC: 69 MG/DL (ref 0–149)
VLDLC SERPL CALC-MCNC: 11 MG/DL (ref 5–40)

## 2024-05-03 ENCOUNTER — TELEPHONE (OUTPATIENT)
Dept: OBSTETRICS AND GYNECOLOGY | Age: 59
End: 2024-05-03
Payer: COMMERCIAL

## 2024-05-03 NOTE — TELEPHONE ENCOUNTER
Refill on pt 1:1 Beist0.5mg/PG 100mg Take 1 capsule by mouth daily at bedtime. Sent with 2 additional refills to [facility].

## 2024-05-17 ENCOUNTER — TELEPHONE (OUTPATIENT)
Dept: OBSTETRICS AND GYNECOLOGY | Age: 59
End: 2024-05-17
Payer: COMMERCIAL

## 2024-05-17 ENCOUNTER — OFFICE VISIT (OUTPATIENT)
Dept: OBSTETRICS AND GYNECOLOGY | Age: 59
End: 2024-05-17
Payer: COMMERCIAL

## 2024-05-17 VITALS
BODY MASS INDEX: 20.66 KG/M2 | SYSTOLIC BLOOD PRESSURE: 106 MMHG | DIASTOLIC BLOOD PRESSURE: 62 MMHG | HEIGHT: 65 IN | WEIGHT: 124 LBS

## 2024-05-17 DIAGNOSIS — R68.82 LIBIDO, DECREASED: ICD-10-CM

## 2024-05-17 DIAGNOSIS — Z78.0 MENOPAUSE: Primary | ICD-10-CM

## 2024-05-17 DIAGNOSIS — F41.9 ANXIETY: ICD-10-CM

## 2024-05-17 RX ORDER — ROSUVASTATIN CALCIUM 10 MG/1
1 TABLET, COATED ORAL DAILY
COMMUNITY
Start: 2024-04-27

## 2024-05-17 RX ORDER — DESVENLAFAXINE SUCCINATE 50 MG/1
1 TABLET, EXTENDED RELEASE ORAL DAILY
COMMUNITY
Start: 2024-04-27

## 2024-05-17 NOTE — TELEPHONE ENCOUNTER
Oxytocin nasal spray 50 units per 1ml. 1 spray in each nostril daily and then 15 minutes prior to intercourse,  Estradiol 0.1mg/ml cream to insert 1 g into vagina at bedtime for 2 weeks and then 2-3 nights a week thereafter, 50:50 biest/ progesterone 100mg capsules taken at bedtime,  and estosterone 1%- apply 0.2ml to inner thigh or buttocks refills called into [facility] [facility]. Pt had 2 additional refills still on file.

## 2024-05-17 NOTE — PROGRESS NOTES
"Subjective   Chief Complaint   Patient presents with    hormone replacement     Pt here today for refills on HRT. Pt voices wanting to try taking Keflex after intercourse due to frequent UTIs. Pt voices no other concerns.      Jyoti Pleitez is a 59 y.o. year old .  No LMP recorded. Patient has had a hysterectomy.  She presents to be seen for follow-up of HRT.  Patient feels like current HRT is a good dose for her.  She has noticed that skin is more dry, but knows that it may be because she has been taking more Mucinex lately.  Sex drive seems much better.  She also feels like adding Pristiq has helped with moods.  Still has some night sweats, but not every night.  No vasomotor symptoms during the day.     The following portions of the patient's history were reviewed and updated as appropriate:current medications and allergies    Social History    Tobacco Use      Smoking status: Former        Types: Cigarettes      Smokeless tobacco: Never      Tobacco comments: 17 YRS AGO    Review of Systems   Constitutional:  Negative for activity change and unexpected weight change.   Respiratory:  Negative for shortness of breath.    Cardiovascular:  Negative for chest pain.   Endocrine: Positive for heat intolerance (Occasionally still has night sweats).   Genitourinary:  Negative for pelvic pain.         Objective   /62   Ht 165.1 cm (65\")   Wt 56.2 kg (124 lb)   BMI 20.63 kg/m²     Physical Exam  Vitals and nursing note reviewed.   Constitutional:       General: She is not in acute distress.     Appearance: Normal appearance. She is well-developed and normal weight.   HENT:      Head: Normocephalic and atraumatic.   Neck:      Thyroid: No thyromegaly.   Pulmonary:      Effort: Pulmonary effort is normal.   Musculoskeletal:         General: Normal range of motion.      Cervical back: Normal range of motion.   Skin:     General: Skin is warm and dry.   Neurological:      Mental Status: She is alert and " oriented to person, place, and time.   Psychiatric:         Mood and Affect: Mood normal.         Behavior: Behavior normal.         Thought Content: Thought content normal.         Judgment: Judgment normal.         Lab Review   No data reviewed    Imaging   No data reviewed     Assessment & Plan    Diagnoses and all orders for this visit:    1. Menopause (Primary): Patient doing well on current dose of hormone replacement therapy, which includes estrogen, progesterone, and testosterone.  Patient uses [facility] for her compounds.  Labs were just drawn in February, so no additional labs were done today.  Patient will return to the office in 3 months    2. Libido, decreased: Improved with compounded HRT    3. Anxiety: Patient feels like this is improving since she started Pristiq       This note was electronically signed.    Rossy Georges MD  May 17, 2024  09:04 CDT    Total time spent today with Jyoti  was 20 minutes (level 3).  Greater than 50% of the time was spent coordinating care, answering her questions and counseling regarding pathophysiology of her presenting problem along with plans for any diagnositc work-up and treatment.

## 2024-05-22 RX ORDER — CEPHALEXIN 500 MG/1
CAPSULE ORAL
Qty: 20 CAPSULE | Refills: 0 | Status: SHIPPED | OUTPATIENT
Start: 2024-05-22

## 2024-07-18 NOTE — ANESTHESIA PROCEDURE NOTES
Airway  Urgency: elective    Date/Time: 11/9/2021 10:16 AM  Airway not difficult    General Information and Staff    Patient location during procedure: OR  CRNA: Robin Guerrero CRNA    Indications and Patient Condition  Indications for airway management: airway protection    Preoxygenated: yes  Mask difficulty assessment: 1 - vent by mask    Final Airway Details  Final airway type: endotracheal airway      Successful airway: ETT    Successful intubation technique: direct laryngoscopy  Endotracheal tube insertion site: oral  Blade: Pedro  Blade size: 3.5 (3.5)  ETT size (mm): 7.5  Cormack-Lehane Classification: grade I - full view of glottis  Placement verified by: chest auscultation and capnometry   Measured from: lips  Number of attempts at approach: 1  Assessment: lips, teeth, and gum same as pre-op and atraumatic intubation               Patient : Nick Langley Age: 17 year old Sex: male   MRN: [id number] Encounter Date: 7/17/2024    History     Chief Complaint   Patient presents with    Allergic Reaction       17-year-old male with past medical history of asthma, and previous anaphylaxis reactions, presented to the emergency department after suspected peanut exposure at 9:30 PM.    Patient was at dinner at [facility], was not aware of any specific exposures, but believes they may have consumed something with peanuts.  Patient began having sensation of throat closure, wheezing, had 3 episodes of vomiting.  Patient took 50 mg of diphenhydramine with no improvement in symptoms of reported to the emergency department.    Patient says that they have multiple EpiPen's at home, did not use their EpiPen.    On ED arrival, wheezing, sensation of throat swelling, continued nausea.  Patient denies any rash.    Patient was accompanied by mother who helped provide history, and confirmed access to EpiPen.               Past/Family/Social History     Allergies   Allergen Reactions    Shrimp   (Food) HIVES and THROAT SWELLING    Seasonal Other (See Comments)     Allergic rhinoconjunctivitis triggers- trees, weeds, grass, cats, dogs, mold, dust mites      Peanut Oil   (Food Or Med) RASH     Positive IgE to peanut and select tree nuts. Avoiding both groups of nuts       No current facility-administered medications for this encounter.     Current Outpatient Medications   Medication Sig    fluticasone-salmeterol 100-50 MCG/ACT inhaler Inhale 1 puff into the lungs in the morning and 1 puff in the evening.    albuterol 108 (90 Base) MCG/ACT inhaler INHALE 2 PUFFS BY MOUTH EVERY 4 HOURS AS NEEDED FOR WHEEZING OR SHORTNESS OF BREATH    fexofenadine (ALLEGRA) 180 MG tablet Take 1 tablet by mouth daily as needed (allergies). Use consistently during problematic seasons    azelastine (OPTIVAR) 0.05 % ophthalmic solution Place 1 drop into both eyes 2 times daily as needed  (itchy watery eyes).    azelastine (ASTELIN) 0.1 % nasal spray Use 1-2 sprays per nostril 1-2 times daily. (Patient not taking: Reported on 6/6/2024)    triamcinolone (ARISTOCORT) 0.1 % ointment APPLY TO AFFECTED AREA(S) THREE TIMES A DAY AS DIRECTED    EPINEPHrine (EpiPen 2-Ladarius) 0.3 MG/0.3ML auto-injector Inject 0.3 mLs into the muscle 1 time as needed for Anaphylaxis.    Spacer/Aero-Holding Chambers (AEROCHAMBER) USE AS DIRECTED (Patient not taking: Reported on 6/6/2024)    methylphenidate (METADATE CD) 10 MG CR capsule Take 1 capsule by mouth every morning. (Patient not taking: Reported on 6/6/2024)    methylphenidate (METADATE CD) 10 MG CR capsule Take 1 capsule by mouth every morning. (Patient not taking: Reported on 6/6/2024)    guanFACINE (INTUNIV) 1 MG TABLET SR 24 HR Take 1 tablet by mouth daily. (Patient not taking: Reported on 6/6/2024)    fluticasone (FLONASE) 50 MCG/ACT nasal spray Spray 1 spray in each nostril daily. (Patient not taking: Reported on 6/6/2024)       Past Medical History:   Diagnosis Date    ADHD (attention deficit hyperactivity disorder)     Allergy     Anxiety     Asthma (CMD)     Eczema     Oppositional defiant disorder     Unspecified asthma(493.90) 3/16/2013       Past Surgical History:   Procedure Laterality Date    Circumcision, primary  2009       Family History   Problem Relation Age of Onset    Asthma Mother     Allergic Rhinitis Mother     Eczema Mother     Atopy Mother         food allergies    Asthma Sister     Allergic Rhinitis Sister     Eczema Sister     Asthma Maternal Grandmother     Diabetes Maternal Grandmother     Asthma Maternal Grandfather     Asthma Brother     ADHD/ADD Maternal Aunt     ADHD/ADD Other        Social History     Tobacco Use    Smoking status: Never    Smokeless tobacco: Never   Substance Use Topics    Alcohol use: No    Drug use: No          Review of Systems   Review of Symptoms     Review of Systems   Constitutional:  Negative for  diaphoresis and fever.   HENT:  Positive for trouble swallowing.    Eyes:  Negative for visual disturbance.   Respiratory:  Positive for shortness of breath and wheezing. Negative for cough.    Cardiovascular:  Negative for chest pain.   Gastrointestinal:  Positive for nausea and vomiting. Negative for abdominal pain.   Genitourinary:  Negative for dysuria.   Musculoskeletal:  Negative for myalgias, neck pain and neck stiffness.   Skin:  Negative for rash.   Neurological:  Negative for headaches.   Hematological: Negative.    Psychiatric/Behavioral: Negative.            Physical Exam   Physical Exam     ED Triage Vitals   ED Triage Vitals Group      Temp --       Heart Rate 07/17/24 2338 93      Resp 07/17/24 2338 16      BP 07/17/24 2338 105/63      SpO2 07/17/24 2338 96 %      EtCO2 mmHg --       Height --       Weight 07/17/24 2343 139 lb 5.3 oz (63.2 kg)      Weight Scale Used --       BMI (Calculated) --       IBW/kg (Calculated) --        Physical Exam  Constitutional:       General: He is in acute distress.   HENT:      Nose: Nose normal.      Mouth/Throat:      Mouth: Mucous membranes are moist.   Cardiovascular:      Rate and Rhythm: Normal rate and regular rhythm.   Pulmonary:      Effort: Respiratory distress present.      Breath sounds: Wheezing present.   Abdominal:      General: Abdomen is flat. There is no distension.      Tenderness: There is no abdominal tenderness.   Musculoskeletal:         General: No deformity.   Skin:     General: Skin is warm and dry.      Findings: No rash.   Neurological:      General: No focal deficit present.      Mental Status: He is alert.   Psychiatric:         Mood and Affect: Mood normal.            Procedures   ED Procedures     Procedures     Lab Results   ED Lab   No results found for this visit on 07/17/24.           Radiology Results   ED Radiology Results     Imaging Results    None              ED Medications   ED Medications     ED Medication Orders (From  admission, onward)      Ordered Start     Status Ordering Provider    07/17/24 2350 07/17/24 2351  ipratropium-albuterol (DUONEB) 0.5-2.5 (3) MG/3ML nebulizer solution 3 mL  (albuterol-ipratropium (DUONEB) nebulization)  ONCE         Last MAR action: Given HASELTINE, PEPPER    07/17/24 2344 07/17/24 2345  EPINEPHrine (ADRENALIN) injection 0.3 mg  (EPINEPHrine)  ONCE         Last MAR action: Given HASELTINE, PEPPER    07/17/24 2344 07/17/24 2345  famotidine (PEPCID) injection 20 mg  (famotidine)  ONCE         Last MAR action: Given HASELTINE, PEPPER    07/17/24 2344 07/17/24 2345  dexAMETHasone (DECADRON) injection 10 mg  (glucocorticoids)  ONCE         Last MAR action: Given HASELTINE, PEPPER                 ED Course     Vitals:    07/18/24 0056 07/18/24 0111 07/18/24 0126 07/18/24 0142   BP: 122/75 116/62 119/65 120/66   Pulse: 74 81 84 67   Resp: 14 15 15 15   SpO2: 97% 94% 95% 97%   Weight:                Medical Decision Making  17-year-old male with past medical history of asthma, and previous anaphylaxis reactions, presented to the emergency department after suspected peanut exposure at 9:30 PM.  - Differential diagnosis on ED arrival (most likely based on history and exam, broader differential considered): Anaphylaxis, allergic reaction, asthma  -Patient meeting clinical criteria for anaphylaxis with respiratory distress.  IM epinephrine was given and patient was closely monitored.  Patient with anxiety post IM injection, but significantly improved symptoms.   -IV Decadron and IV famotidine were also given  -Patient was observed in the emergency department for 2 hours with no recurrence of anaphylactic symptoms  -Patient and patient's mother were counseled on use of EpiPen at home, need to return to emergency care if EpiPen has to be used, possibility of recurrent anaphylaxis up to 6 days from time of exposure.  They expressed understanding.  Discharged home with return precautions.                                        Disposition       Clinical Impression and Diagnosis  4:29 AM       ED Diagnosis       Diagnosis Comment Associated Orders       Final diagnosis    Anaphylaxis, initial encounter -- --            Follow Up:  Gill Brooks DO  [id number]  [place] [address] [phone]      As needed          Summary of your Discharge Medications      You have not been prescribed any medications.         Pt is discharged to home/self care in stable condition.              Discharge 7/18/2024  1:41 AM  Nick Langley discharge to home/self care.                       Joao Jones MD  07/18/24 0422

## 2024-08-22 ENCOUNTER — TELEPHONE (OUTPATIENT)
Dept: OBSTETRICS AND GYNECOLOGY | Age: 59
End: 2024-08-22
Payer: COMMERCIAL

## 2024-08-22 ENCOUNTER — OFFICE VISIT (OUTPATIENT)
Dept: OBSTETRICS AND GYNECOLOGY | Age: 59
End: 2024-08-22
Payer: COMMERCIAL

## 2024-08-22 VITALS
WEIGHT: 123 LBS | DIASTOLIC BLOOD PRESSURE: 70 MMHG | SYSTOLIC BLOOD PRESSURE: 118 MMHG | HEIGHT: 65 IN | BODY MASS INDEX: 20.49 KG/M2

## 2024-08-22 DIAGNOSIS — Z79.890 HORMONE REPLACEMENT THERAPY (HRT): ICD-10-CM

## 2024-08-22 DIAGNOSIS — R68.82 LIBIDO, DECREASED: ICD-10-CM

## 2024-08-22 DIAGNOSIS — Z12.31 BREAST CANCER SCREENING BY MAMMOGRAM: Primary | ICD-10-CM

## 2024-08-22 DIAGNOSIS — Z78.0 MENOPAUSE: ICD-10-CM

## 2024-08-22 PROCEDURE — 99213 OFFICE O/P EST LOW 20 MIN: CPT | Performed by: OBSTETRICS & GYNECOLOGY

## 2024-08-22 NOTE — TELEPHONE ENCOUNTER
Pt currently HRT called to pharmacy. Oxytocin nasal spray 50u per ml. Estradiol 0.1mg/ml, 50:50 biest/ progesterone 100mg capsules, testosterone 1% cream 3 month refills to [facility].

## 2024-08-22 NOTE — PROGRESS NOTES
"Subjective   Chief Complaint   Patient presents with    Menopause     Pt here today for med refill on oxytocin nasal spray, estradiol cream, and 50:50 biest caspules.  Pt voices no concerns.      Jyoti Pleitez is a 59 y.o. year old .  No LMP recorded. Patient has had a hysterectomy.  She presents to be seen for follow-up of HRT.  Patient feels like current HRT is a good dose for her.  Sex drive still somewhat of a problem, but blames this on Pristiq.  Still has some night sweats, but not every night.  No vasomotor symptoms during the day.     The following portions of the patient's history were reviewed and updated as appropriate:current medications and allergies    Social History    Tobacco Use      Smoking status: Former        Types: Cigarettes      Smokeless tobacco: Never      Tobacco comments: 17 YRS AGO    Review of Systems   Constitutional:  Negative for activity change and unexpected weight change.   Respiratory:  Negative for shortness of breath.    Cardiovascular:  Negative for chest pain.   Endocrine: Positive for heat intolerance (Occasionally still has night sweats).   Genitourinary:  Negative for pelvic pain.         Objective   /70   Ht 165.1 cm (65\")   Wt 55.8 kg (123 lb)   BMI 20.47 kg/m²     Physical Exam  Vitals and nursing note reviewed.   Constitutional:       General: She is not in acute distress.     Appearance: Normal appearance. She is well-developed and normal weight.   HENT:      Head: Normocephalic and atraumatic.   Neck:      Thyroid: No thyromegaly.   Pulmonary:      Effort: Pulmonary effort is normal.   Musculoskeletal:         General: Normal range of motion.      Cervical back: Normal range of motion.   Skin:     General: Skin is warm and dry.   Neurological:      Mental Status: She is alert and oriented to person, place, and time.   Psychiatric:         Mood and Affect: Mood normal.         Behavior: Behavior normal.         Thought Content: Thought content " normal.         Judgment: Judgment normal.         Lab Review   No data reviewed    Imaging   No data reviewed     Assessment & Plan    Diagnoses and all orders for this visit:    1. Menopause (Primary): Patient doing well on current dose of hormone replacement therapy, which includes estrogen, progesterone, and testosterone.  Patient uses [email or web address] pharmacy for her compounds. Overall patient is pleased. Patient will return to the office in 3 months    2. Libido, decreased: Improved with compounded HRT, but still having some problems.  She feels like it is due to Pristiq and is planning to ask that physician to start weaning since she no longer feels like it is needed.  Patient feels like life situations causing her anxiety have improved    3. Anxiety: Patient feels like this has improved and does not think she needs medication any longer.    4.  Breast cancer screening by mammogram: Patient overdue for mammogram.  Order has been placed.  Next visit in 3 months will need to be an annual exam       This note was electronically signed.    Rossy Georges MD  August 22, 2024  09:38 CDT    Total time spent today with Jyoti  was 20 minutes (level 3).  Greater than 50% of the time was spent coordinating care, answering her questions and counseling regarding pathophysiology of her presenting problem along with plans for any diagnositc work-up and treatment.

## 2024-11-26 ENCOUNTER — OFFICE VISIT (OUTPATIENT)
Dept: OBSTETRICS AND GYNECOLOGY | Age: 59
End: 2024-11-26
Payer: COMMERCIAL

## 2024-11-26 VITALS
HEIGHT: 65 IN | DIASTOLIC BLOOD PRESSURE: 64 MMHG | BODY MASS INDEX: 20.99 KG/M2 | WEIGHT: 126 LBS | SYSTOLIC BLOOD PRESSURE: 102 MMHG

## 2024-11-26 DIAGNOSIS — R39.15 URINARY URGENCY: ICD-10-CM

## 2024-11-26 DIAGNOSIS — R68.82 LIBIDO, DECREASED: ICD-10-CM

## 2024-11-26 DIAGNOSIS — Z79.890 HORMONE REPLACEMENT THERAPY (HRT): ICD-10-CM

## 2024-11-26 DIAGNOSIS — Z78.0 MENOPAUSE: ICD-10-CM

## 2024-11-26 DIAGNOSIS — F41.9 ANXIETY: ICD-10-CM

## 2024-11-26 DIAGNOSIS — Z01.419 ENCOUNTER FOR GYNECOLOGICAL EXAMINATION WITHOUT ABNORMAL FINDING: Primary | ICD-10-CM

## 2024-11-26 PROCEDURE — 99396 PREV VISIT EST AGE 40-64: CPT | Performed by: OBSTETRICS & GYNECOLOGY

## 2024-11-26 RX ORDER — BUSPIRONE HYDROCHLORIDE 10 MG/1
10 TABLET ORAL 3 TIMES DAILY
COMMUNITY

## 2024-11-26 RX ORDER — DESVENLAFAXINE 25 MG/1
25 TABLET, EXTENDED RELEASE ORAL DAILY
Qty: 90 TABLET | Refills: 4 | Status: SHIPPED | OUTPATIENT
Start: 2024-11-26

## 2024-11-26 NOTE — PROGRESS NOTES
Subjective   Chief Complaint   Patient presents with    Annual Exam     Pt here today for annual exam. Pt s/p BIB [facility]. Pt last mammo 23. Pt voices having lower back pain due to a fall in her 20s. Pt voices the pain comes and goes. Pt voices no other concerns.      Jyoti Pleitez is a 59 y.o. year old  menopausal female presenting to be seen for her annual exam.  Overall, the patient reports to be feeling pretty good except for chronic back pain.  The patient is status-post hysterectomy. Hot flashes and night sweats are not a significant problem since patient is taking compounded HRT. Jyoti is currently using oxytocin nasal spray, vaginal estrogen cream for dryness, 50:50 biest/progesterone capsules, and topical testosterone cream.    SEXUAL Hx:  She is sexually active.  In the past year there has not been new sexual partners.  No pain with intercourse.  Sex drive seems normal with testosterone replacement and oxytocin nasal spray.  HEALTH Hx:  She exercises regularly: yes.  The patient reports regular self breast exams: no.  Mammogram scheduled for next month  She has noticed changes in height: no.    No Additional Complaints Reported    The following portions of the patient's history were reviewed and updated as appropriate:problem list, current medications, allergies, past family history, past medical history, past social history, and past surgical history.    Social History    Tobacco Use      Smoking status: Former        Types: Cigarettes      Smokeless tobacco: Never      Tobacco comments: 17 YRS AGO    Review of Systems   Constitutional:  Negative for activity change and unexpected weight change.   HENT:  Negative for congestion.    Respiratory:  Positive for shortness of breath (occasionally, but thinks it is because she had now had Covid several times).    Cardiovascular:  Negative for chest pain.   Gastrointestinal:  Negative for abdominal pain, blood in stool, constipation and diarrhea.     "    Colonoscopy was done in 2020, and is due again in 2025   Endocrine: Negative for cold intolerance and heat intolerance.   Genitourinary:  Positive for urgency. Negative for difficulty urinating, dyspareunia, enuresis, pelvic pain, vaginal bleeding, vaginal discharge and vaginal pain.   Musculoskeletal:  Positive for arthralgias (mild) and back pain. Negative for neck pain and neck stiffness.   Skin:  Negative for rash.   Neurological:  Positive for dizziness (only when looking up, thinks this is left-over effect of traumatic brain injury in the past). Negative for headaches.   Psychiatric/Behavioral:  Negative for dysphoric mood and sleep disturbance. The patient is not nervous/anxious.         Mood well-controlled with Pristiq and just occasional buspar         Objective   /64   Ht 165.1 cm (65\")   Wt 57.2 kg (126 lb)   BMI 20.97 kg/m²   Physical Exam  Vitals and nursing note reviewed. Exam conducted with a chaperone present.   Constitutional:       General: She is not in acute distress.     Appearance: Normal appearance. She is well-developed and normal weight.   HENT:      Head: Normocephalic and atraumatic.   Neck:      Thyroid: No thyromegaly.   Cardiovascular:      Rate and Rhythm: Normal rate and regular rhythm.      Heart sounds: No murmur heard.  Pulmonary:      Effort: Pulmonary effort is normal.      Breath sounds: Normal breath sounds.   Chest:   Breasts:     Right: No inverted nipple or mass.      Left: No inverted nipple or mass.   Abdominal:      General: There is no distension.      Palpations: Abdomen is soft.      Tenderness: There is no abdominal tenderness.   Genitourinary:     General: Normal vulva.      Exam position: Lithotomy position.      Labia:         Right: No tenderness or lesion.         Left: No tenderness or lesion.       Urethra: No prolapse.      Vagina: Normal. No vaginal discharge or bleeding.      Uterus: Absent.       Adnexa:         Right: No tenderness or " fullness.          Left: No tenderness or fullness.        Rectum: Normal.      Comments: Pt s/p hysterectomy: uterus and cervix surgically absent.  Vaginal cuff unremarkable.  Musculoskeletal:         General: Normal range of motion.      Cervical back: Normal range of motion and neck supple.   Skin:     General: Skin is warm and dry.   Neurological:      Mental Status: She is alert and oriented to person, place, and time.   Psychiatric:         Mood and Affect: Mood normal.         Behavior: Behavior normal.         Thought Content: Thought content normal.         Judgment: Judgment normal.          Assessment & Plan    Diagnoses and all orders for this visit:    1. Encounter for gynecological examination without abnormal finding (Primary): Exam unremarkable, s/p hysterectomy.  Regular SBE encouraged; patient already has mammogram scheduled for next month.  DEXA showed osteopenia in 2023 and will be due to repeat again next year.  Routine screening labs with PCP.    2. Urinary urgency: Discussed common dietary triggers to include citrus products, caffeine, artificial sweeteners and ETOH.  Patient not interested in anticholinergic medication at this time    3. Menopause: symptoms well-managed with compounded HRT    4. Anxiety: Patient has been breaking her Pristiq in 1/2 and feels like this dose is better for her.  She is eventually hoping to wean completely off the medication in the future.  -     Desvenlafaxine Succinate ER (Pristiq) 25 MG tablet sustained-release 24 hour; Take 1 tablet by mouth Daily.  Dispense: 90 tablet; Refill: 4    5. Libido, decreased: much better on current testosterone replacement and oxytocin nasal spray.  Calling in new refills on all compounds today.  Patient will RTO in 3 months for follow-up.  She will need to have her testosterone level drawn at her next visit.    6. Hormone replacement therapy (HRT)    This note was electronically signed.    Rossy Georges MD  11/26/2024  11:07  CST

## 2024-12-02 ENCOUNTER — HOSPITAL ENCOUNTER (OUTPATIENT)
Dept: MAMMOGRAPHY | Facility: HOSPITAL | Age: 59
Discharge: HOME | End: 2024-12-02
Admitting: OBSTETRICS & GYNECOLOGY
Payer: COMMERCIAL

## 2024-12-02 DIAGNOSIS — R92.8 ABNORMAL MAMMOGRAM: Primary | ICD-10-CM

## 2024-12-02 DIAGNOSIS — Z12.31 BREAST CANCER SCREENING BY MAMMOGRAM: ICD-10-CM

## 2024-12-02 PROCEDURE — 77067 SCR MAMMO BI INCL CAD: CPT

## 2024-12-02 PROCEDURE — 77063 BREAST TOMOSYNTHESIS BI: CPT

## 2024-12-04 ENCOUNTER — HOSPITAL ENCOUNTER (OUTPATIENT)
Dept: ULTRASOUND IMAGING | Facility: HOSPITAL | Age: 59
Discharge: HOME | End: 2024-12-04
Payer: COMMERCIAL

## 2024-12-04 ENCOUNTER — HOSPITAL ENCOUNTER (OUTPATIENT)
Dept: MAMMOGRAPHY | Facility: HOSPITAL | Age: 59
Discharge: HOME | End: 2024-12-04
Payer: COMMERCIAL

## 2024-12-04 DIAGNOSIS — R92.8 ABNORMAL MAMMOGRAM: ICD-10-CM

## 2024-12-04 LAB — CREAT BLDA-MCNC: 0.8 MG/DL (ref 0.6–1.3)

## 2024-12-04 PROCEDURE — 77066 DX MAMMO INCL CAD BI: CPT

## 2024-12-04 PROCEDURE — 82565 ASSAY OF CREATININE: CPT

## 2024-12-04 PROCEDURE — G0279 TOMOSYNTHESIS, MAMMO: HCPCS

## 2024-12-04 PROCEDURE — 25510000001 IOPAMIDOL PER 1 ML: Performed by: OBSTETRICS & GYNECOLOGY

## 2024-12-04 RX ORDER — IOPAMIDOL 755 MG/ML
100 INJECTION, SOLUTION INTRAVASCULAR
Status: COMPLETED | OUTPATIENT
Start: 2024-12-04 | End: 2024-12-04

## 2024-12-04 RX ADMIN — IOPAMIDOL 100 ML: 755 INJECTION, SOLUTION INTRAVENOUS at 09:38

## 2025-02-26 ENCOUNTER — OFFICE VISIT (OUTPATIENT)
Dept: OBSTETRICS AND GYNECOLOGY | Age: 60
End: 2025-02-26
Payer: COMMERCIAL

## 2025-02-26 VITALS
DIASTOLIC BLOOD PRESSURE: 60 MMHG | WEIGHT: 127 LBS | SYSTOLIC BLOOD PRESSURE: 104 MMHG | HEIGHT: 65 IN | BODY MASS INDEX: 21.16 KG/M2

## 2025-02-26 DIAGNOSIS — F41.9 ANXIETY: ICD-10-CM

## 2025-02-26 DIAGNOSIS — Z78.0 MENOPAUSE: Primary | ICD-10-CM

## 2025-02-26 DIAGNOSIS — Z90.79 S/P TAH-BSO (TOTAL ABDOMINAL HYSTERECTOMY AND BILATERAL SALPINGO-OOPHORECTOMY): ICD-10-CM

## 2025-02-26 DIAGNOSIS — Z90.710 S/P TAH-BSO (TOTAL ABDOMINAL HYSTERECTOMY AND BILATERAL SALPINGO-OOPHORECTOMY): ICD-10-CM

## 2025-02-26 DIAGNOSIS — Z90.722 S/P TAH-BSO (TOTAL ABDOMINAL HYSTERECTOMY AND BILATERAL SALPINGO-OOPHORECTOMY): ICD-10-CM

## 2025-02-26 DIAGNOSIS — R68.82 LIBIDO, DECREASED: ICD-10-CM

## 2025-02-26 RX ORDER — BUSPIRONE HYDROCHLORIDE 5 MG/1
TABLET ORAL
COMMUNITY
Start: 2025-02-06

## 2025-02-26 NOTE — PROGRESS NOTES
"Subjective   Chief Complaint   Patient presents with    Menopause     Pt here today for follow up on HRT. Pt feeling like hormones need adjusted. Pt voices feeling hot flashes and increase in fatigue.      Jyoit Pleitez is a 59 y.o. year old .  No LMP recorded. Patient has had a hysterectomy.  She presents to be seen for follow-up of HRT.  Patient feels like current HRT is \"off a little\".  Patient has gone back to working and notices that her stamina is low.  Patient is completely exhausted when she gets home; patient feels like it is more than would be \"normal\" since her job is not physically demanding.  Sex drive still somewhat of a problem, but blames this on Pristiq.  Still has some night sweats, but not every night.  No vasomotor symptoms during the day.     The following portions of the patient's history were reviewed and updated as appropriate:current medications and allergies    Social History    Tobacco Use      Smoking status: Former        Types: Cigarettes      Smokeless tobacco: Never      Tobacco comments: 17 YRS AGO    Past Surgical History:   Procedure Laterality Date    AUGMENTATION MAMMAPLASTY      BREAST AUGMENTATION      CLAVICLE OPEN REDUCTION INTERNAL FIXATION Left 8/3/2021    Procedure: OPEN REDUCTION INTERNAL FIXATION LEFT DISTAL CLAVICLE;  Surgeon: Nixon Garcia MD;  Location: [facility] OR;  Service: Orthopedics;  Laterality: Left;    COLONOSCOPY N/A 2020    Procedure: COLONOSCOPY WITH ANESTHESIA;  Surgeon: Cuco Cannon DO;  Location: [facility] ENDOSCOPY;  Service: Gastroenterology;  Laterality: N/A;  preop; hx colon polyps  postop;  Romaine Lawrence MD    FOOT SURGERY Bilateral     HARDWARE REMOVAL Left 2021    Procedure: REMOVAL OF HARDWARE LEFT CLAVICLE;  Surgeon: Nixon Garcia MD;  Location: [facility] OR;  Service: Orthopedics;  Laterality: Left;    HYSTERECTOMY      BIB [facility] for uterine fibroids in  by Dr Acosta    OOPHORECTOMY      " "TONSILLECTOMY      WISDOM TOOTH EXTRACTION          Review of Systems   Constitutional:  Negative for activity change and unexpected weight change.   Respiratory:  Negative for shortness of breath.    Cardiovascular:  Negative for chest pain.   Endocrine: Positive for heat intolerance (Occasionally still has night sweats).   Genitourinary:  Negative for pelvic pain.         Objective   /60   Ht 165.1 cm (65\")   Wt 57.6 kg (127 lb)   BMI 21.13 kg/m²     Physical Exam  Vitals and nursing note reviewed.   Constitutional:       General: She is not in acute distress.     Appearance: Normal appearance. She is well-developed and normal weight.   HENT:      Head: Normocephalic and atraumatic.   Neck:      Thyroid: No thyromegaly.   Pulmonary:      Effort: Pulmonary effort is normal.   Musculoskeletal:         General: Normal range of motion.      Cervical back: Normal range of motion.   Skin:     General: Skin is warm and dry.   Neurological:      Mental Status: She is alert and oriented to person, place, and time.   Psychiatric:         Mood and Affect: Mood normal.         Behavior: Behavior normal.         Thought Content: Thought content normal.         Judgment: Judgment normal.         Lab Review   No data reviewed    Imaging   No data reviewed     Assessment & Plan    Diagnoses and all orders for this visit:    1. Menopause (Primary): Patient doing \"OK\" on current dose of hormone replacement therapy (includes estrogen, progesterone, and testosterone), but feels like her energy level should be better.  Patient uses [facility] pharmacy for her compounds. New hormone panel today.  Overall patient is pleased. Patient will return to the office in 3 months    2. Libido, decreased: Improved with compounded HRT, but still having some problems.  She feels like it is due to Pristiq     3. Anxiety: Patient stable on Pristiq         This note was electronically signed.    Rossy Georges MD  February 26, 2025  10:13 " CST    Total time spent today with Jyoti  was 20 minutes (level 3).  Greater than 50% of the time was spent coordinating care, answering her questions and counseling regarding pathophysiology of her presenting problem along with plans for any diagnositc work-up and treatment.

## 2025-02-27 LAB
ESTRADIOL SERPL-MCNC: 7.5 PG/ML
PROGEST SERPL-MCNC: 2 NG/ML
TESTOST SERPL-MCNC: 4 NG/DL (ref 4–50)

## 2025-03-04 ENCOUNTER — TELEPHONE (OUTPATIENT)
Dept: OBSTETRICS AND GYNECOLOGY | Age: 60
End: 2025-03-04
Payer: COMMERCIAL

## 2025-05-27 ENCOUNTER — TELEPHONE (OUTPATIENT)
Dept: OBSTETRICS AND GYNECOLOGY | Age: 60
End: 2025-05-27

## 2025-05-27 NOTE — TELEPHONE ENCOUNTER
Caller: Jyoti Pleitez    Relationship: Self    Best call back number: [phone]    What was the call regarding: PT RETURNING A MISSED CALL     Is it okay if the provider responds through "[employer]hart: NO, PT STATED SHE DOES NOT USE [facility] AND DOES NOT WANT ANYTHING SENT THERE.

## 2025-06-24 ENCOUNTER — OFFICE VISIT (OUTPATIENT)
Dept: OBSTETRICS AND GYNECOLOGY | Age: 60
End: 2025-06-24
Payer: COMMERCIAL

## 2025-06-24 VITALS
WEIGHT: 127 LBS | HEIGHT: 65 IN | DIASTOLIC BLOOD PRESSURE: 82 MMHG | SYSTOLIC BLOOD PRESSURE: 118 MMHG | BODY MASS INDEX: 21.16 KG/M2

## 2025-06-24 DIAGNOSIS — H53.9 VISION CHANGES: ICD-10-CM

## 2025-06-24 DIAGNOSIS — Z78.0 MENOPAUSE: Primary | ICD-10-CM

## 2025-06-24 DIAGNOSIS — Z90.722 S/P TAH-BSO (TOTAL ABDOMINAL HYSTERECTOMY AND BILATERAL SALPINGO-OOPHORECTOMY): ICD-10-CM

## 2025-06-24 DIAGNOSIS — Z90.79 S/P TAH-BSO (TOTAL ABDOMINAL HYSTERECTOMY AND BILATERAL SALPINGO-OOPHORECTOMY): ICD-10-CM

## 2025-06-24 DIAGNOSIS — Z83.518 FAMILY HISTORY OF MACULAR DEGENERATION: ICD-10-CM

## 2025-06-24 DIAGNOSIS — Z90.710 S/P TAH-BSO (TOTAL ABDOMINAL HYSTERECTOMY AND BILATERAL SALPINGO-OOPHORECTOMY): ICD-10-CM

## 2025-06-24 NOTE — PROGRESS NOTES
"Subjective   Chief Complaint   Patient presents with    Menopause     Pt here today for HRT follow up. Pt wanting to discuss.      Jyoti Pleitez is a 60 y.o. year old .  No LMP recorded. Patient has had a hysterectomy.  She presents to be seen for follow-up of HRT.  Patient feels like overall she is doing really well.  At one point she was feeling a little lethargic and so she went from taking HRT every other day to taking it daily.    Only rare hot flashes or night sweats.  Patient really feels like she \"off her schedule\" with creams that have to be alternated every other day    The following portions of the patient's history were reviewed and updated as appropriate:current medications and allergies    Social History    Tobacco Use      Smoking status: Former        Types: Cigarettes      Smokeless tobacco: Never      Tobacco comments: 17 YRS AGO    Past Surgical History:   Procedure Laterality Date    AUGMENTATION MAMMAPLASTY      BREAST AUGMENTATION      CLAVICLE OPEN REDUCTION INTERNAL FIXATION Left 8/3/2021    Procedure: OPEN REDUCTION INTERNAL FIXATION LEFT DISTAL CLAVICLE;  Surgeon: Nixon Garcia MD;  Location: [facility] OR;  Service: Orthopedics;  Laterality: Left;    COLONOSCOPY N/A 2020    Procedure: COLONOSCOPY WITH ANESTHESIA;  Surgeon: Cuco Cannon DO;  Location: [facility] ENDOSCOPY;  Service: Gastroenterology;  Laterality: N/A;  preop; hx colon polyps  postop;  Romaine Lawrence MD    FOOT SURGERY Bilateral     HARDWARE REMOVAL Left 2021    Procedure: REMOVAL OF HARDWARE LEFT CLAVICLE;  Surgeon: Nixon Garcia MD;  Location: [facility] OR;  Service: Orthopedics;  Laterality: Left;    HYSTERECTOMY      BIB [facility] for uterine fibroids in  by Dr Acosta    OOPHORECTOMY      TONSILLECTOMY      WISDOM TOOTH EXTRACTION          Review of Systems   Constitutional:  Negative for activity change and unexpected weight change.   Respiratory:  Negative for shortness of " "breath.    Cardiovascular:  Negative for chest pain.   Endocrine: Positive for heat intolerance (Occasionally still has night sweats).   Genitourinary:  Negative for pelvic pain.         Objective   /82   Ht 165.1 cm (65\")   Wt 57.6 kg (127 lb)   BMI 21.13 kg/m²     Physical Exam  Vitals and nursing note reviewed.   Constitutional:       General: She is not in acute distress.     Appearance: Normal appearance. She is well-developed and normal weight.   HENT:      Head: Normocephalic and atraumatic.   Neck:      Thyroid: No thyromegaly.   Pulmonary:      Effort: Pulmonary effort is normal.   Musculoskeletal:         General: Normal range of motion.      Cervical back: Normal range of motion.   Skin:     General: Skin is warm and dry.   Neurological:      Mental Status: She is alert and oriented to person, place, and time.   Psychiatric:         Mood and Affect: Mood normal.         Behavior: Behavior normal.         Thought Content: Thought content normal.         Judgment: Judgment normal.         Lab Review   No data reviewed    Imaging   No data reviewed     Assessment & Plan      Diagnoses and all orders for this visit:    1. Menopause (Primary): Patient would like to try a different form of oral replacement therapy, so that her levels are more consistent every day.  Patient has been offered the option to have all of her hormone replacement compounded into a single capsule, to take orally.  She favors this plan and would like to give it a try because it seems like a more simple regimen.  Compounded capsules containing 200 mg of micronized progesterone, 1 mg of estradiol, and 1.25 mg of methyltestosterone have been sent to [facility] pharmacy, [facility] with 2 refills    2. S/P BIB-BSO (total abdominal hysterectomy and bilateral salpingo-oophorectomy)    3. Family history of macular degeneration: Patient noting changes in her vision recently and has a family history of macular degeneration.  Patient " requesting referral   -     Ambulatory Referral to Ophthalmology    4. Vision changes  -     Ambulatory Referral to Ophthalmology            This note was electronically signed.    Rossy Georges MD  June 24, 2025  09:17 CDT    Total time spent today with Jyoti  was 20 minutes (level 3).  Greater than 50% of the time was spent coordinating care, answering her questions and counseling regarding pathophysiology of her presenting problem along with plans for any diagnositc work-up and treatment.

## 2025-08-01 DIAGNOSIS — N39.0 FREQUENT UTI: Primary | ICD-10-CM

## 2025-08-04 RX ORDER — CEPHALEXIN 500 MG/1
CAPSULE ORAL
Qty: 20 CAPSULE | Refills: 0 | Status: SHIPPED | OUTPATIENT
Start: 2025-08-04

## (undated) DEVICE — PENCL ES MEGADINE EZ/CLEAN BUTN W/HOLSTR 10FT

## (undated) DEVICE — SENSR O2 OXIMAX FNGR A/ 18IN NONSTR

## (undated) DEVICE — 3M™ IOBAN™ 2 ANTIMICROBIAL INCISE DRAPE 6650EZ: Brand: IOBAN™ 2

## (undated) DEVICE — YANKAUER,BULB TIP WITH VENT: Brand: ARGYLE

## (undated) DEVICE — SPLNT PLSTR CAST 4IN

## (undated) DEVICE — 4-PORT MANIFOLD: Brand: NEPTUNE 2

## (undated) DEVICE — GLV SURG BIOGEL LTX PF 8

## (undated) DEVICE — CVR UNIV C/ARM

## (undated) DEVICE — Device: Brand: DEFENDO AIR/WATER/SUCTION AND BIOPSY VALVE

## (undated) DEVICE — PK SHLDR 30

## (undated) DEVICE — ARM SLING II: Brand: DEROYAL

## (undated) DEVICE — PATIENT RETURN ELECTRODE, SINGLE-USE, CONTACT QUALITY MONITORING, ADULT, WITH 9FT CORD, FOR PATIENTS WEIGING OVER 33LBS. (15KG): Brand: MEGADYNE

## (undated) DEVICE — GLV SURG DERMASSURE GRN LF PF 8.0

## (undated) DEVICE — THE SINGLE USE ETRAP – POLYP TRAP IS USED FOR SUCTION RETRIEVAL OF ENDOSCOPICALLY REMOVED POLYPS.: Brand: ETRAP

## (undated) DEVICE — ANTIBACTERIAL UNDYED BRAIDED (POLYGLACTIN 910), SYNTHETIC ABSORBABLE SUTURE: Brand: COATED VICRYL

## (undated) DEVICE — BNDG GZ SOF-FORM CONFRM 2X75IN LF STRL

## (undated) DEVICE — THE CHANNEL CLEANING BRUSH IS A NYLON FLEXI BRUSH ATTACHED TO A FLEXIBLE PLASTIC SHEATH DESIGNED TO SAFELY REMOVE DEBRIS FROM FLEXIBLE ENDOSCOPES.

## (undated) DEVICE — DRSNG BRDR MEPILEXLITE SLFADHR SIL 2X5

## (undated) DEVICE — PAD CAST SPECIST 4IN NS

## (undated) DEVICE — TBG SMPL FLTR LINE NASL 02/C02 A/ BX/100

## (undated) DEVICE — PK TURNOVER RM ADV

## (undated) DEVICE — MASK,OXYGEN,MED CONC,ADLT,7' TUB, UC: Brand: PENDING

## (undated) DEVICE — SNAR POLYP CAPTIVATOR MICROHEX 13 240CM

## (undated) DEVICE — OPTIFOAM GENTLE SA, POSTOP, 4X8: Brand: MEDLINE

## (undated) DEVICE — ADHS SKIN PREMIERPRO EXOFIN TOPICAL HI/VISC .5ML

## (undated) DEVICE — BIT DRL PERC QC CALIB 2.8X200MM

## (undated) DEVICE — 3M™ STERI-DRAPE™ U-DRAPE 1015: Brand: STERI-DRAPE™

## (undated) DEVICE — 1010 S-DRAPE TOWEL DRAPE 10/BX: Brand: STERI-DRAPE™

## (undated) DEVICE — ISO/ALC 70PCT 16OZ

## (undated) DEVICE — BIT DRL QC DIA 2.5X110MM